# Patient Record
Sex: FEMALE | Race: BLACK OR AFRICAN AMERICAN | NOT HISPANIC OR LATINO | Employment: OTHER | URBAN - METROPOLITAN AREA
[De-identification: names, ages, dates, MRNs, and addresses within clinical notes are randomized per-mention and may not be internally consistent; named-entity substitution may affect disease eponyms.]

---

## 2023-10-02 ENCOUNTER — OFFICE VISIT (OUTPATIENT)
Dept: GASTROENTEROLOGY | Facility: HOSPITAL | Age: 68
End: 2023-10-02
Payer: MEDICAID

## 2023-10-02 VITALS
HEART RATE: 90 BPM | DIASTOLIC BLOOD PRESSURE: 75 MMHG | TEMPERATURE: 96 F | SYSTOLIC BLOOD PRESSURE: 114 MMHG | WEIGHT: 154 LBS

## 2023-10-02 DIAGNOSIS — K86.89 MASS OF HEAD OF PANCREAS (HHS-HCC): Primary | ICD-10-CM

## 2023-10-02 DIAGNOSIS — C80.1 OBSTRUCTIVE JAUNDICE DUE TO MALIGNANT NEOPLASM (MULTI): ICD-10-CM

## 2023-10-02 DIAGNOSIS — K83.1 OBSTRUCTIVE JAUNDICE DUE TO MALIGNANT NEOPLASM (MULTI): ICD-10-CM

## 2023-10-02 PROCEDURE — 1159F MED LIST DOCD IN RCRD: CPT | Performed by: INTERNAL MEDICINE

## 2023-10-02 PROCEDURE — 1036F TOBACCO NON-USER: CPT | Performed by: INTERNAL MEDICINE

## 2023-10-02 PROCEDURE — 99215 OFFICE O/P EST HI 40 MIN: CPT | Performed by: INTERNAL MEDICINE

## 2023-10-02 PROCEDURE — 99205 OFFICE O/P NEW HI 60 MIN: CPT | Performed by: INTERNAL MEDICINE

## 2023-10-02 PROCEDURE — 1126F AMNT PAIN NOTED NONE PRSNT: CPT | Performed by: INTERNAL MEDICINE

## 2023-10-02 RX ORDER — ATORVASTATIN CALCIUM 80 MG/1
80 TABLET, FILM COATED ORAL DAILY
COMMUNITY
Start: 2023-08-21

## 2023-10-02 RX ORDER — FERROUS SULFATE TAB 325 MG (65 MG ELEMENTAL FE) 325 (65 FE) MG
1 TAB ORAL DAILY
COMMUNITY
Start: 2023-06-20

## 2023-10-02 RX ORDER — CYCLOBENZAPRINE HCL 10 MG
10 TABLET ORAL 2 TIMES DAILY PRN
COMMUNITY
Start: 2023-09-06

## 2023-10-02 RX ORDER — LANCETS
EACH MISCELLANEOUS
COMMUNITY
Start: 2023-08-11

## 2023-10-02 RX ORDER — ACETAMINOPHEN 500 MG
5 TABLET ORAL NIGHTLY
COMMUNITY
Start: 2023-09-16

## 2023-10-02 RX ORDER — POLYETHYLENE GLYCOL 3350 17 G/17G
POWDER, FOR SOLUTION ORAL
COMMUNITY
Start: 2023-09-17

## 2023-10-02 RX ORDER — LANCETS 30 GAUGE
EACH MISCELLANEOUS
COMMUNITY
Start: 2023-06-07

## 2023-10-02 RX ORDER — METFORMIN HYDROCHLORIDE 850 MG/1
850 TABLET ORAL 2 TIMES DAILY
COMMUNITY
Start: 2023-09-05 | End: 2023-11-21 | Stop reason: WASHOUT

## 2023-10-02 RX ORDER — DICLOFENAC SODIUM 10 MG/G
GEL TOPICAL
COMMUNITY
Start: 2023-06-08

## 2023-10-02 RX ORDER — OMEPRAZOLE 20 MG/1
20 CAPSULE, DELAYED RELEASE ORAL DAILY
COMMUNITY
Start: 2023-08-21 | End: 2023-11-21 | Stop reason: WASHOUT

## 2023-10-02 RX ORDER — IBUPROFEN 200 MG
TABLET ORAL
COMMUNITY
Start: 2023-09-17

## 2023-10-02 RX ORDER — ACETAMINOPHEN 325 MG/1
TABLET ORAL
COMMUNITY
Start: 2023-09-17

## 2023-10-02 RX ORDER — BLOOD SUGAR DIAGNOSTIC
STRIP MISCELLANEOUS
COMMUNITY
Start: 2023-09-17

## 2023-10-02 RX ORDER — ASCORBIC ACID 1000 MG
TABLET ORAL
COMMUNITY
Start: 2023-09-22 | End: 2023-11-14 | Stop reason: HOSPADM

## 2023-10-02 RX ORDER — INSULIN LISPRO 100 [IU]/ML
INJECTION, SOLUTION INTRAVENOUS; SUBCUTANEOUS
COMMUNITY
Start: 2023-09-17

## 2023-10-02 RX ORDER — LISINOPRIL 20 MG/1
20 TABLET ORAL DAILY
COMMUNITY
Start: 2023-09-05 | End: 2023-11-21 | Stop reason: WASHOUT

## 2023-10-02 RX ORDER — DONEPEZIL HYDROCHLORIDE 10 MG/1
10 TABLET, FILM COATED ORAL
COMMUNITY
Start: 2023-09-05

## 2023-10-02 RX ORDER — LATANOPROST 50 UG/ML
SOLUTION/ DROPS OPHTHALMIC
COMMUNITY
Start: 2023-08-28

## 2023-10-02 RX ORDER — METHOCARBAMOL 500 MG/1
500 TABLET, FILM COATED ORAL EVERY 6 HOURS PRN
COMMUNITY
Start: 2023-02-20 | End: 2023-11-14 | Stop reason: HOSPADM

## 2023-10-02 RX ORDER — AMLODIPINE BESYLATE 10 MG/1
10 TABLET ORAL DAILY
COMMUNITY
Start: 2023-09-06

## 2023-10-02 RX ORDER — FLUTICASONE PROPIONATE 50 MCG
SPRAY, SUSPENSION (ML) NASAL
COMMUNITY
Start: 2023-02-22

## 2023-10-02 RX ORDER — SIMETHICONE 40MG/0.6ML
1 SUSPENSION, DROPS(FINAL DOSAGE FORM)(ML) ORAL DAILY
COMMUNITY
Start: 2023-09-05

## 2023-10-02 RX ORDER — TRAMADOL HYDROCHLORIDE 50 MG/1
50 TABLET ORAL
COMMUNITY
Start: 2023-09-28

## 2023-10-02 RX ORDER — LANCETS 33 GAUGE
EACH MISCELLANEOUS
COMMUNITY
Start: 2023-09-17

## 2023-10-02 RX ORDER — SENNOSIDES 8.6 MG
TABLET ORAL
COMMUNITY
Start: 2023-09-17

## 2023-10-02 RX ORDER — CLONAZEPAM 0.5 MG/1
0.5 TABLET ORAL 2 TIMES DAILY
COMMUNITY
Start: 2023-09-12

## 2023-10-02 RX ORDER — LEVOTHYROXINE SODIUM 75 UG/1
75 TABLET ORAL DAILY
COMMUNITY
Start: 2023-05-24

## 2023-10-02 RX ORDER — PANCRELIPASE 60000; 12000; 38000 [USP'U]/1; [USP'U]/1; [USP'U]/1
CAPSULE, DELAYED RELEASE PELLETS ORAL
COMMUNITY
Start: 2023-09-17

## 2023-10-02 RX ORDER — ONDANSETRON 4 MG/1
TABLET, FILM COATED ORAL
COMMUNITY
Start: 2023-09-17

## 2023-10-02 RX ORDER — PAROXETINE HYDROCHLORIDE 40 MG/1
40 TABLET, FILM COATED ORAL
COMMUNITY
Start: 2023-09-05 | End: 2023-11-21 | Stop reason: WASHOUT

## 2023-10-02 RX ORDER — LIDOCAINE 50 MG/G
PATCH TOPICAL
COMMUNITY
Start: 2023-09-08 | End: 2023-11-14 | Stop reason: HOSPADM

## 2023-10-02 RX ORDER — MEDICAL SUPPLY, MISCELLANEOUS
EACH MISCELLANEOUS
COMMUNITY
Start: 2023-09-17

## 2023-10-02 ASSESSMENT — ENCOUNTER SYMPTOMS
LOSS OF SENSATION IN FEET: 0
OCCASIONAL FEELINGS OF UNSTEADINESS: 1
DEPRESSION: 0

## 2023-10-02 ASSESSMENT — PAIN SCALES - GENERAL: PAINLEVEL: 0-NO PAIN

## 2023-10-02 NOTE — LETTER
October 4, 2023     Siria Medrano MD  [facility]    Patient: Laura No   YOB: 1955   Date of Visit: 10/2/2023       Dear Dr. Siria Medrano MD:    Thank you for referring Laura No to me for evaluation. Below are my notes for this consultation.  If you have questions, please do not hesitate to call me. I look forward to following your patient along with you.       Sincerely,     Demetrio Sarmiento MD      CC: No Recipients  ______________________________________________________________________________________    Laura No is a 68 y.o. female presenting with pancreatic mass.  Ms. No is a 68 year old woman with early Alzheimer's dementia, HTN, poorly-controlled type II diabetes mellitus (last HbA1c = 10.8% 9/22/23; insulin-requiring), hypothyroidism, hyperlipidemia, NCNC anemia, history of COVID 12/2020, depression, and 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 Siria Medrano - [facility] for “mass in pancreas” (per the Central Scheduling comment).     The patient obtains her care from the [facility], and she recently underwent evaluation there for obstructive jaundice secondary to pancreatic mass with elevated  301 (including CT chest/abdomen/pelvis, EUS-FNB, and ERCP with biliary duct stent placement and pancreatic duct stent placement followed by EGD with PD stent removal). She was also recently seen by a pancreatic surgeon (Dr. Mono Rehman) there with recommendations to proceed with Whipple resection, but the patient's daughter was dissatisfied with her mother's care there, and she wanted to obtain another opinion.     The patient is accompanied by her daughter (Maria Luz Wilson [phone]) and her sister who provide additional history. Her daughter provides most of the information regarding her history.     Her daughter reports that her mother went to see Dr. Siria Medrano on 9/11/23 due to nausea, vomiting, and upper  abdominal discomfort for several days. Her physician then told her to go the [facility] ER. She had labs showing elevated LFTs, but her daughter states her mother was not jaundiced. Subsequently, a CT scan revealed a pancreatic mass with double-duct sign, and she was admitted there. She subsequently improved symptom-wise, and she currently is doing well.     Review of 1532 pages of records in [facility] includes the following from the [facility]:  9/22/23 CMP normal except sodium 135 CO2 20 glucose 112 albumin 3.7 calcium 10.5 bilirubin 0.9 alk phos 300 AST 26 ALT 41; TSH 1.690 T4 1.6; HbA1c = 10.8%; Hct 34.4 MCV 91.0  9/19/23 EGD (Dr. Shreyas Baptiste): removal of PD stent  9/17/23 CMP sodium 132 K 3.6 CO2 20 glucose 274 albumin 3.4 calcium 10.3 TB 1.0 alk phos 395 AST 13 ALT 56; Hct 34.8 MCV 88.0  9/15/23 MRI of pancreas/MRCP: area of subtle mildly T2 hyperintense signal adjacent to GB fossa/segment IVB measuring 2.9 cm; mild bile duct dilation post-biliary stent placement; post-cholecystectomy; ill-defined enlargement of pancreatic head with peripancreatic fat stranding and edema with PD dilated to 5 mm with abrupt cut-off in pancreatic neck; 1.2 x 1.1 L adrenal nodule; metallic densities in right ventral abdominal wall  9/14/23 CT chest: no signs of metastatic disease  9/12/23  301; IgG4 5.4; EUS (Dr. Baptiste): 36 mm irregular mass in pancreatic head; EUS-FNB: scant tissue containing duodenal mucosa and smooth muscle with acute and chronic inflammation; no morphologic evidence of malignancy; cell block: limited atypical cells in background of acute and chronic inflammation; ERCP (Dr. Baptiste): 5 Fr x 5 cm “uncovered metal stent” placed into ventral pancreatic duct; biliary pre-cut sphincterotomy done over pancreatic stent; severe stenosis of middle third of CBD with dilation then placement of “7 Fr x 10 cm” plastic biliary stent; CMP normal except sodium 134 K 3.2 TB 4.0 alk phos 831  ; Hct  37.3 MCV 85.0  23 CT abdomen/pelvis: ill-defined 3.4 cm hepatic parenchymal low-attenuation area adjacent to GB fossa; moderate intra- and extra-hepatic biliary dilation to head of pancreas measuring 1.3 cm; dilated PD to 5 cm with abrupt caliber change in pancreatic head associated with 4.4 x 2.7 cm masslike soft-tissue mass; 1.0 cm L adrenal gland nodule unchanged compared to 23; CMP sodium 130 K 3.2 albumin 4.0 calcium 10.7 TB 4.5 alk phos 758   lipase 118  22 CMP normal except glucose 232 calcium 10.9 with albumin 4.6 TB 0.5 alk phos 145 AST 15 ALT 20     PMH:  As noted above     SH:    Lives in [place]  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     Surgical History  She has no past surgical history on file.     Social History  She reports that she has never smoked. She has never used smokeless tobacco. She reports current alcohol use of about 7.0 standard drinks of alcohol per week. She reports that she does not use drugs.     Family History  Family History   No family history on file.        Allergies  Patient has no known allergies.     Review of Systems     Physical Exam  Vitals reviewed.   HENT:      Head: Normocephalic.   Eyes:      Conjunctiva/sclera: Conjunctivae normal.   Cardiovascular:      Rate and Rhythm: Normal rate and regular rhythm.   Pulmonary:      Effort: Pulmonary effort is normal.   Abdominal:      Palpations: Abdomen is soft.    Skin:     General: Skin is warm and dry.   Neurological:      General: No focal deficit present.      Mental Status: She is alert.   Psychiatric:         Mood and Affect: Mood normal.         Last Recorded Vitals  Blood pressure 114/75, pulse 90, temperature 35.6 °C (96 °F), weight 69.9 kg (154 lb).     Relevant Results                 Assessment/Plan         Pancreatic mass with double-duct sign including obstructive jaundice, elevated  (albeit in setting of jaundice), with subsequent endoscopic evaluation and intervention at [facility] (Dr. Baptiste) including EUS-FNB which was non-diagnostic, and ERCP with questionable 5Fr x 5cm “uncovered metal stent” (suspect this was a plastic stent, not a metal stent) placed in the pancreatic duct and subsequently removed on a later EGD, and “7Fr x 10 cm” (suspect 10Fr x 7 cm) plastic biliary stent which remains in place. A subsequent CT of chest revealed no evidence for metastatic disease. A subsequent MRI of the pancreas reportedly revealed no evidence for vessel invasion. The patient's LFTs normalized as of 9/22/23. Plans were made to proceed with Whipple resection, but the patient's daughter requested a second opinion. I had a long discussion with the patient and her family present re: options, risks, benefits, and alternatives. The patient herself would like to proceed with surgery. I discussed that if she were deemed not to be a surgical candidate or if upfront chemotherapy was recommended that another EUS-FNB of the pancreas mass would likely be needed to attempt to acquire tissue diagnosis, although this is most likely to be pancreatic adenocarcinoma. She and her family were also informed of the potential options if repeat EUS-FNB was again non-diagnostic. They were also informed that if malignancy is confirmed and if surgery is not undertaken that a repeat ERCP with biliary stent exchange will also need to be performed before mid-December. After discussion,  the patient still wishes to proceed with surgical evaluation, and her family supports her decision. She wishes to transfer her care to . She was referred to Dr. Alex Dia for his opinion.     Hepatitis C screening.  I discussed CDC guidelines for hepatitis C screening.  At this time, screening has been deferred.     All the patient's, daughter's, and sister's questions were answered to their full satisfaction.      Medical decision-making and time spent in both non-face-to-face time and face-to-face included time spent preparing to see patient, obtaining and/or reviewing separately obtained history, review and/or ordering of labs, radiology studies (including personal review of imaging), medical tests, and/or prior medical records (including summary of records), independently interpreting results and/or communicating results to patient/family, review and/or ordering of endoscopic procedures with risk factors, performing a medically necessary appropriate physical examination, counseling and educating the patient/family, communicating with other providers, care coordination, and documenting clinical information as noted above.        Demetrio Sarmiento MD, RA  Chief Medical   [facility] [place] [facility]  Associate Chief and Director of Clinical Operations  Division of Gastroenterology and Liver Disease  [facility] Master Clinician  Professor of Medicine  [facility]     cc: Siria Medrano MD - [facility]; MD Demetrio Martínez MD    History Of Present Illness  Laura oN is a 68 y.o. female presenting with pancreatic mass.  Ms. No is a 68 year old woman with early Alzheimer's dementia, HTN, poorly-controlled type II diabetes mellitus (last HbA1c = 10.8% 9/22/23; insulin-requiring), hypothyroidism, hyperlipidemia, NCNC anemia, history of COVID 12/2020, depression, and DJD with R  knee replacement 12/2008 who is referred by Dr. Siria Medrano - [facility] for “mass in pancreas” (per the Central Scheduling comment).    The patient obtains her care from the [facility], and she recently underwent evaluation there for obstructive jaundice secondary to pancreatic mass with elevated  301 (including CT chest/abdomen/pelvis, EUS-FNB, and ERCP with biliary duct stent placement and pancreatic duct stent placement followed by EGD with PD stent removal). She was also recently seen by a pancreatic surgeon (Dr. Mono Rehman) there with recommendations to proceed with Whipple resection, but the patient's daughter was dissatisfied with her mother's care there, and she wanted to obtain another opinion.    The patient is accompanied by her daughter (Maria Luz Wilson [phone]) and her sister who provide additional history. Her daughter provides most of the information regarding her history.    Her daughter reports that her mother went to see Dr. Siria Medrano on 9/11/23 due to nausea, vomiting, and upper abdominal discomfort for several days. Her physician then told her to go the [facility] ER. She had labs showing elevated LFTs, but her daughter states her mother was not jaundiced. Subsequently, a CT scan revealed a pancreatic mass with double-duct sign, and she was admitted there. She subsequently improved symptom-wise, and she currently is doing well.    Review of 1532 pages of records in [facility] includes the following from the [facility]:  9/22/23 CMP normal except sodium 135 CO2 20 glucose 112 albumin 3.7 calcium 10.5 bilirubin 0.9 alk phos 300 AST 26 ALT 41; TSH 1.690 T4 1.6; HbA1c = 10.8%; Hct 34.4 MCV 91.0  9/19/23 EGD (Dr. Shreyas Baptiste): removal of PD stent  9/17/23 CMP sodium 132 K 3.6 CO2 20 glucose 274 albumin 3.4 calcium 10.3 TB 1.0 alk phos 395 AST 13 ALT 56; Hct 34.8 MCV 88.0  9/15/23 MRI of pancreas/MRCP: area of subtle mildly T2 hyperintense signal adjacent to  GB fossa/segment IVB measuring 2.9 cm; mild bile duct dilation post-biliary stent placement; post-cholecystectomy; ill-defined enlargement of pancreatic head with peripancreatic fat stranding and edema with PD dilated to 5 mm with abrupt cut-off in pancreatic neck; 1.2 x 1.1 L adrenal nodule; metallic densities in right ventral abdominal wall  23 CT chest: no signs of metastatic disease  23  301; IgG4 5.4; EUS (Dr. Baptiste): 36 mm irregular mass in pancreatic head; EUS-FNB: scant tissue containing duodenal mucosa and smooth muscle with acute and chronic inflammation; no morphologic evidence of malignancy; cell block: limited atypical cells in background of acute and chronic inflammation; ERCP (Dr. Baptiste): 5 Fr x 5 cm “uncovered metal stent” placed into ventral pancreatic duct; biliary pre-cut sphincterotomy done over pancreatic stent; severe stenosis of middle third of CBD with dilation then placement of “7 Fr x 10 cm” plastic biliary stent; CMP normal except sodium 134 K 3.2 TB 4.0 alk phos 831  ; Hct 37.3 MCV 85.0  23 CT abdomen/pelvis: ill-defined 3.4 cm hepatic parenchymal low-attenuation area adjacent to GB fossa; moderate intra- and extra-hepatic biliary dilation to head of pancreas measuring 1.3 cm; dilated PD to 5 cm with abrupt caliber change in pancreatic head associated with 4.4 x 2.7 cm masslike soft-tissue mass; 1.0 cm L adrenal gland nodule unchanged compared to 23; CMP sodium 130 K 3.2 albumin 4.0 calcium 10.7 TB 4.5 alk phos 758   lipase 118  22 CMP normal except glucose 232 calcium 10.9 with albumin 4.6 TB 0.5 alk phos 145 AST 15 ALT 20    PMH:  As noted above    SH:    Lives in [place]  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    Surgical History  She has no past surgical history on file.     Social History  She reports that she has never smoked. She has never used smokeless tobacco. She reports current alcohol use of about 7.0 standard drinks of alcohol per week. She reports that she does not use drugs.    Family History  No family history on file.     Allergies  Patient has no known allergies.    Review of Systems     Physical Exam  Vitals reviewed.   HENT:      Head: Normocephalic.   Eyes:      Conjunctiva/sclera: Conjunctivae normal.   Cardiovascular:      Rate and Rhythm: Normal rate and regular rhythm.   Pulmonary:      Effort: Pulmonary effort is normal.   Abdominal:      Palpations: Abdomen is soft.   Skin:     General: Skin is warm and dry.   Neurological:      General: No focal deficit present.      Mental Status: She is alert.   Psychiatric:         Mood and Affect: Mood normal.        Last Recorded Vitals  Blood pressure 114/75, pulse 90, temperature 35.6 °C (96 °F), weight 69.9 kg (154 lb).    Relevant Results             Assessment/Plan      Pancreatic mass with double-duct sign including obstructive jaundice, elevated  (albeit in setting of jaundice), with subsequent endoscopic evaluation and intervention at [facility] (Dr. Baptiste) including EUS-FNB which was non-diagnostic, and ERCP with questionable 5Fr x 5cm “uncovered metal stent” (suspect this was a plastic stent, not a metal stent) placed in the pancreatic duct and subsequently removed on a later EGD, and “7Fr x 10 cm” (suspect 10Fr x 7 cm) plastic biliary stent which remains in place. A subsequent CT of chest revealed no evidence for  metastatic disease. A subsequent MRI of the pancreas reportedly revealed no evidence for vessel invasion. The patient's LFTs normalized as of 9/22/23. Plans were made to proceed with Whipple resection, but the patient's daughter requested a second opinion. I had a long discussion with the patient and her family present re: options, risks, benefits, and alternatives. The patient herself would like to proceed with surgery. I discussed that if she were deemed not to be a surgical candidate or if upfront chemotherapy was recommended that another EUS-FNB of the pancreas mass would likely be needed to attempt to acquire tissue diagnosis, although this is most likely to be pancreatic adenocarcinoma. She and her family were also informed of the potential options if repeat EUS-FNB was again non-diagnostic. They were also informed that if malignancy is confirmed and if surgery is not undertaken that a repeat ERCP with biliary stent exchange will also need to be performed before mid-December. After discussion, the patient still wishes to proceed with surgical evaluation, and her family supports her decision. She wishes to transfer her care to . She was referred to Dr. Alex Dia for his opinion.    Hepatitis C screening.  I discussed CDC guidelines for hepatitis C screening.  At this time, screening has been deferred.    All the patient's, daughter's, and sister's questions were answered to their full satisfaction.     Medical decision-making and time spent in both non-face-to-face time and face-to-face included time spent preparing to see patient, obtaining and/or reviewing separately obtained history, review and/or ordering of labs, radiology studies (including personal review of imaging), medical tests, and/or prior medical records (including summary of records), independently interpreting results and/or communicating results to patient/family, review and/or ordering of endoscopic procedures with risk factors,  performing a medically necessary appropriate physical examination, counseling and educating the patient/family, communicating with other providers, care coordination, and documenting clinical information as noted above.      Demetrio Sarmiento MD, RA  Chief Medical   [facility]  Associate Chief and Director of Clinical Operations  Division of Gastroenterology and Liver Disease  [facility] Master Clinician  Professor of Medicine  [facility]    cc: Siria Medrano MD - [facility]; MD Demetrio Martínez MD

## 2023-10-03 NOTE — PATIENT INSTRUCTIONS
Thanks for seeing me!  I have given you educational material on pancreas cancer and discussed the various options for treatment. You and your daughter would like to get a second surgical opinion as you are strongly considering to proceed with surgery rather than chemotherapy followed by surgery. I will arrange for you to see Dr. Dia as soon as possible to further discuss.  If you have any other questions or concerns that come up after you see me today, you can call me at [phone].

## 2023-10-03 NOTE — H&P
History Of Present Illness  Laura No is a 68 y.o. female presenting with pancreatic mass.  Ms. No is a 68 year old woman with early Alzheimer's dementia, HTN, poorly-controlled type II diabetes mellitus (last HbA1c = 10.8% 9/22/23; insulin-requiring), hypothyroidism, hyperlipidemia, NCNC anemia, history of COVID 12/2020, depression, and 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 Siria Medrano - [facility] for “mass in pancreas” (per the Central Scheduling comment).    The patient obtains her care from the [facility], and she recently underwent evaluation there for obstructive jaundice secondary to pancreatic mass with elevated  301 (including CT chest/abdomen/pelvis, EUS-FNB, and ERCP with biliary duct stent placement and pancreatic duct stent placement followed by EGD with PD stent removal). She was also recently seen by a pancreatic surgeon (Dr. Mono Rehman) there with recommendations to proceed with Whipple resection, but the patient's daughter was dissatisfied with her mother's care there, and she wanted to obtain another opinion.    The patient is accompanied by her daughter (Maria Luz Wilson [phone]) and her sister who provide additional history. Her daughter provides most of the information regarding her history.    Her daughter reports that her mother went to see Dr. Siria Medrano on 9/11/23 due to nausea, vomiting, and upper abdominal discomfort for several days. Her physician then told her to go the [facility] ER. She had labs showing elevated LFTs, but her daughter states her mother was not jaundiced. Subsequently, a CT scan revealed a pancreatic mass with double-duct sign, and she was admitted there. She subsequently improved symptom-wise, and she currently is doing well.    Review of 1532 pages of records in [facility] includes the following from the [facility]:  9/22/23 CMP normal except sodium 135 CO2 20 glucose 112 albumin 3.7 calcium 10.5  bilirubin 0.9 alk phos 300 AST 26 ALT 41; TSH 1.690 T4 1.6; HbA1c = 10.8%; Hct 34.4 MCV 91.0  9/19/23 EGD (Dr. Shreyas Baptiste): removal of PD stent  9/17/23 CMP sodium 132 K 3.6 CO2 20 glucose 274 albumin 3.4 calcium 10.3 TB 1.0 alk phos 395 AST 13 ALT 56; Hct 34.8 MCV 88.0  9/15/23 MRI of pancreas/MRCP: area of subtle mildly T2 hyperintense signal adjacent to GB fossa/segment IVB measuring 2.9 cm; mild bile duct dilation post-biliary stent placement; post-cholecystectomy; ill-defined enlargement of pancreatic head with peripancreatic fat stranding and edema with PD dilated to 5 mm with abrupt cut-off in pancreatic neck; 1.2 x 1.1 L adrenal nodule; metallic densities in right ventral abdominal wall  9/14/23 CT chest: no signs of metastatic disease  9/12/23  301; IgG4 5.4; EUS (Dr. Baptiste): 36 mm irregular mass in pancreatic head; EUS-FNB: scant tissue containing duodenal mucosa and smooth muscle with acute and chronic inflammation; no morphologic evidence of malignancy; cell block: limited atypical cells in background of acute and chronic inflammation; ERCP (Dr. Baptiste): 5 Fr x 5 cm “uncovered metal stent” placed into ventral pancreatic duct; biliary pre-cut sphincterotomy done over pancreatic stent; severe stenosis of middle third of CBD with dilation then placement of “7 Fr x 10 cm” plastic biliary stent; CMP normal except sodium 134 K 3.2 TB 4.0 alk phos 831  ; Hct 37.3 MCV 85.0  9/11/23 CT abdomen/pelvis: ill-defined 3.4 cm hepatic parenchymal low-attenuation area adjacent to GB fossa; moderate intra- and extra-hepatic biliary dilation to head of pancreas measuring 1.3 cm; dilated PD to 5 cm with abrupt caliber change in pancreatic head associated with 4.4 x 2.7 cm masslike soft-tissue mass; 1.0 cm L adrenal gland nodule unchanged compared to 5/22/23; CMP sodium 130 K 3.2 albumin 4.0 calcium 10.7 TB 4.5 alk phos 758   lipase 118  5/22/22 CMP normal except glucose 232 calcium 10.9 with  albumin 4.6 TB 0.5 alk phos 145 AST 15 ALT 20    PMH:  As noted above    SH:    Lives in [place]  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    Surgical History  She has no past surgical history on file.     Social History  She reports that she has never smoked. She has never used smokeless tobacco. She reports current alcohol use of about 7.0 standard drinks of alcohol per week. She reports that she does not use drugs.    Family History  No family history on file.     Allergies  Patient has no known allergies.    Review of Systems     Physical Exam  Vitals reviewed.   HENT:      Head: Normocephalic.   Eyes:      Conjunctiva/sclera: Conjunctivae normal.   Cardiovascular:      Rate and Rhythm: Normal rate and regular rhythm.   Pulmonary:      Effort: Pulmonary effort is normal.   Abdominal:      Palpations: Abdomen is soft.   Skin:     General: Skin is warm and dry.   Neurological:      General: No focal deficit present.      Mental Status: She is alert.   Psychiatric:         Mood and Affect: Mood normal.        Last Recorded Vitals  Blood pressure 114/75, pulse 90, temperature 35.6 °C (96 °F), weight 69.9 kg (154 lb).    Relevant Results             Assessment/Plan       Pancreatic mass with double-duct sign including obstructive jaundice, elevated  (albeit in setting of jaundice), with subsequent endoscopic evaluation and intervention at [facility] (Dr. Baptiste) including  EUS-FNB which was non-diagnostic, and ERCP with questionable 5Fr x 5cm “uncovered metal stent” (suspect this was a plastic stent, not a metal stent) placed in the pancreatic duct and subsequently removed on a later EGD, and “7Fr x 10 cm” (suspect 10Fr x 7 cm) plastic biliary stent which remains in place. A subsequent CT of chest revealed no evidence for metastatic disease. A subsequent MRI of the pancreas reportedly revealed no evidence for vessel invasion. The patient's LFTs normalized as of 9/22/23. Plans were made to proceed with Whipple resection, but the patient's daughter requested a second opinion. I had a long discussion with the patient and her family present re: options, risks, benefits, and alternatives. The patient herself would like to proceed with surgery. I discussed that if she were deemed not to be a surgical candidate or if upfront chemotherapy was recommended that another EUS-FNB of the pancreas mass would likely be needed to attempt to acquire tissue diagnosis, although this is most likely to be pancreatic adenocarcinoma. She and her family were also informed of the potential options if repeat EUS-FNB was again non-diagnostic. They were also informed that if malignancy is confirmed and if surgery is not undertaken that a repeat ERCP with biliary stent exchange will also need to be performed before mid-December. After discussion, the patient still wishes to proceed with surgical evaluation, and her family supports her decision. She wishes to transfer her care to . She was referred to Dr. Alex Dia for his opinion.    Hepatitis C screening.  I discussed CDC guidelines for hepatitis C screening.  At this time, screening has been deferred.    All the patient's, daughter's, and sister's questions were answered to their full satisfaction.     Medical decision-making and time spent in both non-face-to-face time and face-to-face included time spent preparing to see patient, obtaining and/or  reviewing separately obtained history, review and/or ordering of labs, radiology studies (including personal review of imaging), medical tests, and/or prior medical records (including summary of records), independently interpreting results and/or communicating results to patient/family, review and/or ordering of endoscopic procedures with risk factors, performing a medically necessary appropriate physical examination, counseling and educating the patient/family, communicating with other providers, care coordination, and documenting clinical information as noted above.      Demetrio Sarmiento MD, RA  Chief Medical   [facility]  Associate Chief and Director of Clinical Operations  Division of Gastroenterology and Liver Disease  [facility] Master Clinician  Professor of Medicine  [facility]    cc: Siria Medrano MD - [facility]; MD Demetrio Martínez MD

## 2023-10-04 NOTE — H&P
History Of Present Illness  Laura No is a 68 y.o. female presenting with pancreatic mass.  Ms. No is a 68 year old woman with early Alzheimer's dementia, HTN, poorly-controlled type II diabetes mellitus (last HbA1c = 10.8% 9/22/23; insulin-requiring), hypothyroidism, hyperlipidemia, NCNC anemia, history of COVID 12/2020, depression, and 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 Siria Medrano - [facility] for “mass in pancreas” (per the Central Scheduling comment).    The patient obtains her care from the [facility], and she recently underwent evaluation there for obstructive jaundice secondary to pancreatic mass with elevated  301 (including CT chest/abdomen/pelvis, EUS-FNB, and ERCP with biliary duct stent placement and pancreatic duct stent placement followed by EGD with PD stent removal). She was also recently seen by a pancreatic surgeon (Dr. Mono Rehman) there with recommendations to proceed with Whipple resection, but the patient's daughter was dissatisfied with her mother's care there, and she wanted to obtain another opinion.    The patient is accompanied by her daughter (Maria Luz Wilson [phone]) and her sister who provide additional history. Her daughter provides most of the information regarding her history.    Her daughter reports that her mother went to see Dr. Siria Medrano on 9/11/23 due to nausea, vomiting, and upper abdominal discomfort for several days. Her physician then told her to go the [facility] ER. She had labs showing elevated LFTs, but her daughter states her mother was not jaundiced. Subsequently, a CT scan revealed a pancreatic mass with double-duct sign, and she was admitted there. She subsequently improved symptom-wise, and she currently is doing well.    Review of 1532 pages of records in [facility] includes the following from the [facility]:  9/22/23 CMP normal except sodium 135 CO2 20 glucose 112 albumin 3.7 calcium 10.5  bilirubin 0.9 alk phos 300 AST 26 ALT 41; TSH 1.690 T4 1.6; HbA1c = 10.8%; Hct 34.4 MCV 91.0  9/19/23 EGD (Dr. Shreyas Baptiste): removal of PD stent  9/17/23 CMP sodium 132 K 3.6 CO2 20 glucose 274 albumin 3.4 calcium 10.3 TB 1.0 alk phos 395 AST 13 ALT 56; Hct 34.8 MCV 88.0  9/15/23 MRI of pancreas/MRCP: area of subtle mildly T2 hyperintense signal adjacent to GB fossa/segment IVB measuring 2.9 cm; mild bile duct dilation post-biliary stent placement; post-cholecystectomy; ill-defined enlargement of pancreatic head with peripancreatic fat stranding and edema with PD dilated to 5 mm with abrupt cut-off in pancreatic neck; 1.2 x 1.1 L adrenal nodule; metallic densities in right ventral abdominal wall  9/14/23 CT chest: no signs of metastatic disease  9/12/23  301; IgG4 5.4; EUS (Dr. Baptiste): 36 mm irregular mass in pancreatic head; EUS-FNB: scant tissue containing duodenal mucosa and smooth muscle with acute and chronic inflammation; no morphologic evidence of malignancy; cell block: limited atypical cells in background of acute and chronic inflammation; ERCP (Dr. Baptiste): 5 Fr x 5 cm “uncovered metal stent” placed into ventral pancreatic duct; biliary pre-cut sphincterotomy done over pancreatic stent; severe stenosis of middle third of CBD with dilation then placement of “7 Fr x 10 cm” plastic biliary stent; CMP normal except sodium 134 K 3.2 TB 4.0 alk phos 831  ; Hct 37.3 MCV 85.0  9/11/23 CT abdomen/pelvis: ill-defined 3.4 cm hepatic parenchymal low-attenuation area adjacent to GB fossa; moderate intra- and extra-hepatic biliary dilation to head of pancreas measuring 1.3 cm; dilated PD to 5 cm with abrupt caliber change in pancreatic head associated with 4.4 x 2.7 cm masslike soft-tissue mass; 1.0 cm L adrenal gland nodule unchanged compared to 5/22/23; CMP sodium 130 K 3.2 albumin 4.0 calcium 10.7 TB 4.5 alk phos 758   lipase 118  5/22/22 CMP normal except glucose 232 calcium 10.9 with  albumin 4.6 TB 0.5 alk phos 145 AST 15 ALT 20    PMH:  As noted above    SH:    Lives in [place]  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    Surgical History  She has no past surgical history on file.     Social History  She reports that she has never smoked. She has never used smokeless tobacco. She reports current alcohol use of about 7.0 standard drinks of alcohol per week. She reports that she does not use drugs.    Family History  No family history on file.     Allergies  Patient has no known allergies.    Review of Systems     Physical Exam  Vitals reviewed.   HENT:      Head: Normocephalic.   Eyes:      Conjunctiva/sclera: Conjunctivae normal.   Cardiovascular:      Rate and Rhythm: Normal rate and regular rhythm.   Pulmonary:      Effort: Pulmonary effort is normal.   Abdominal:      Palpations: Abdomen is soft.   Skin:     General: Skin is warm and dry.   Neurological:      General: No focal deficit present.      Mental Status: She is alert.   Psychiatric:         Mood and Affect: Mood normal.        Last Recorded Vitals  Blood pressure 114/75, pulse 90, temperature 35.6 °C (96 °F), weight 69.9 kg (154 lb).    Relevant Results             Assessment/Plan       Pancreatic mass with double-duct sign including obstructive jaundice, elevated  (albeit in setting of jaundice), with subsequent endoscopic evaluation and intervention at [facility] (Dr. Baptiste) including  EUS-FNB which was non-diagnostic, and ERCP with questionable 5Fr x 5cm “uncovered metal stent” (suspect this was a plastic stent, not a metal stent) placed in the pancreatic duct and subsequently removed on a later EGD, and “7Fr x 10 cm” (suspect 10Fr x 7 cm) plastic biliary stent which remains in place. A subsequent CT of chest revealed no evidence for metastatic disease. A subsequent MRI of the pancreas reportedly revealed no evidence for vessel invasion. The patient's LFTs normalized as of 9/22/23. Plans were made to proceed with Whipple resection, but the patient's daughter requested a second opinion. I had a long discussion with the patient and her family present re: options, risks, benefits, and alternatives. The patient herself would like to proceed with surgery. I discussed that if she were deemed not to be a surgical candidate or if upfront chemotherapy was recommended that another EUS-FNB of the pancreas mass would likely be needed to attempt to acquire tissue diagnosis, although this is most likely to be pancreatic adenocarcinoma. She and her family were also informed of the potential options if repeat EUS-FNB was again non-diagnostic. They were also informed that if malignancy is confirmed and if surgery is not undertaken that a repeat ERCP with biliary stent exchange will also need to be performed before mid-December. After discussion, the patient still wishes to proceed with surgical evaluation, and her family supports her decision. She wishes to transfer her care to . She was referred to Dr. Alex Dia for his opinion.    Hepatitis C screening.  I discussed CDC guidelines for hepatitis C screening.  At this time, screening has been deferred.    All the patient's, daughter's, and sister's questions were answered to their full satisfaction.     Medical decision-making and time spent in both non-face-to-face time and face-to-face included time spent preparing to see patient, obtaining and/or  reviewing separately obtained history, review and/or ordering of labs, radiology studies (including personal review of imaging), medical tests, and/or prior medical records (including summary of records), independently interpreting results and/or communicating results to patient/family, review and/or ordering of endoscopic procedures with risk factors, performing a medically necessary appropriate physical examination, counseling and educating the patient/family, communicating with other providers, care coordination, and documenting clinical information as noted above.      Demetrio Sarmiento MD, RA  Chief Medical   [facility] [place]  Associate Chief and Director of Clinical Operations  Division of Gastroenterology and Liver Disease  [facility] Master Clinician  Professor of Medicine  [facility]    cc: Siria Medrano MD - [facility]; MD Demetrio Martínez MD

## 2023-10-04 NOTE — H&P (VIEW-ONLY)
Laura No is a 68 y.o. female presenting with pancreatic mass.  Ms. No is a 68 year old woman with early Alzheimer's dementia, HTN, poorly-controlled type II diabetes mellitus (last HbA1c = 10.8% 9/22/23; insulin-requiring), hypothyroidism, hyperlipidemia, NCNC anemia, history of COVID 12/2020, depression, and 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 Siria Medrano - [facility] for “mass in pancreas” (per the Central Scheduling comment).     The patient obtains her care from the [facility], and she recently underwent evaluation there for obstructive jaundice secondary to pancreatic mass with elevated  301 (including CT chest/abdomen/pelvis, EUS-FNB, and ERCP with biliary duct stent placement and pancreatic duct stent placement followed by EGD with PD stent removal). She was also recently seen by a pancreatic surgeon (Dr. Mono Rehman) there with recommendations to proceed with Whipple resection, but the patient's daughter was dissatisfied with her mother's care there, and she wanted to obtain another opinion.     The patient is accompanied by her daughter (Maria Luz Wilson [phone]) and her sister who provide additional history. Her daughter provides most of the information regarding her history.     Her daughter reports that her mother went to see Dr. Siria Medrano on 9/11/23 due to nausea, vomiting, and upper abdominal discomfort for several days. Her physician then told her to go the [facility] ER. She had labs showing elevated LFTs, but her daughter states her mother was not jaundiced. Subsequently, a CT scan revealed a pancreatic mass with double-duct sign, and she was admitted there. She subsequently improved symptom-wise, and she currently is doing well.     Review of 1532 pages of records in [facility] includes the following from the [facility]:  9/22/23 CMP normal except sodium 135 CO2 20 glucose 112 albumin 3.7 calcium 10.5 bilirubin 0.9 alk phos  300 AST 26 ALT 41; TSH 1.690 T4 1.6; HbA1c = 10.8%; Hct 34.4 MCV 91.0  9/19/23 EGD (Dr. Shreyas Baptiste): removal of PD stent  9/17/23 CMP sodium 132 K 3.6 CO2 20 glucose 274 albumin 3.4 calcium 10.3 TB 1.0 alk phos 395 AST 13 ALT 56; Hct 34.8 MCV 88.0  9/15/23 MRI of pancreas/MRCP: area of subtle mildly T2 hyperintense signal adjacent to GB fossa/segment IVB measuring 2.9 cm; mild bile duct dilation post-biliary stent placement; post-cholecystectomy; ill-defined enlargement of pancreatic head with peripancreatic fat stranding and edema with PD dilated to 5 mm with abrupt cut-off in pancreatic neck; 1.2 x 1.1 L adrenal nodule; metallic densities in right ventral abdominal wall  9/14/23 CT chest: no signs of metastatic disease  9/12/23  301; IgG4 5.4; EUS (Dr. Baptiste): 36 mm irregular mass in pancreatic head; EUS-FNB: scant tissue containing duodenal mucosa and smooth muscle with acute and chronic inflammation; no morphologic evidence of malignancy; cell block: limited atypical cells in background of acute and chronic inflammation; ERCP (Dr. Baptiste): 5 Fr x 5 cm “uncovered metal stent” placed into ventral pancreatic duct; biliary pre-cut sphincterotomy done over pancreatic stent; severe stenosis of middle third of CBD with dilation then placement of “7 Fr x 10 cm” plastic biliary stent; CMP normal except sodium 134 K 3.2 TB 4.0 alk phos 831  ; Hct 37.3 MCV 85.0  9/11/23 CT abdomen/pelvis: ill-defined 3.4 cm hepatic parenchymal low-attenuation area adjacent to GB fossa; moderate intra- and extra-hepatic biliary dilation to head of pancreas measuring 1.3 cm; dilated PD to 5 cm with abrupt caliber change in pancreatic head associated with 4.4 x 2.7 cm masslike soft-tissue mass; 1.0 cm L adrenal gland nodule unchanged compared to 5/22/23; CMP sodium 130 K 3.2 albumin 4.0 calcium 10.7 TB 4.5 alk phos 758   lipase 118  5/22/22 CMP normal except glucose 232 calcium 10.9 with albumin 4.6 TB 0.5 alk  phos 145 AST 15 ALT 20     PMH:  As noted above     SH:    Lives in [place]  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     Surgical History  She has no past surgical history on file.     Social History  She reports that she has never smoked. She has never used smokeless tobacco. She reports current alcohol use of about 7.0 standard drinks of alcohol per week. She reports that she does not use drugs.     Family History  Family History   No family history on file.        Allergies  Patient has no known allergies.     Review of Systems     Physical Exam  Vitals reviewed.   HENT:      Head: Normocephalic.   Eyes:      Conjunctiva/sclera: Conjunctivae normal.   Cardiovascular:      Rate and Rhythm: Normal rate and regular rhythm.   Pulmonary:      Effort: Pulmonary effort is normal.   Abdominal:      Palpations: Abdomen is soft.   Skin:     General: Skin is warm and dry.   Neurological:      General: No focal deficit present.      Mental Status: She is alert.   Psychiatric:         Mood and Affect: Mood normal.         Last Recorded Vitals  Blood pressure 114/75, pulse 90, temperature 35.6 °C (96 °F), weight 69.9 kg (154 lb).     Relevant Results                 Assessment/Plan         Pancreatic mass with double-duct sign including obstructive jaundice, elevated  (albeit in setting of jaundice), with subsequent endoscopic evaluation and intervention at [facility] (Dr. Baptiste)  including EUS-FNB which was non-diagnostic, and ERCP with questionable 5Fr x 5cm “uncovered metal stent” (suspect this was a plastic stent, not a metal stent) placed in the pancreatic duct and subsequently removed on a later EGD, and “7Fr x 10 cm” (suspect 10Fr x 7 cm) plastic biliary stent which remains in place. A subsequent CT of chest revealed no evidence for metastatic disease. A subsequent MRI of the pancreas reportedly revealed no evidence for vessel invasion. The patient's LFTs normalized as of 9/22/23. Plans were made to proceed with Whipple resection, but the patient's daughter requested a second opinion. I had a long discussion with the patient and her family present re: options, risks, benefits, and alternatives. The patient herself would like to proceed with surgery. I discussed that if she were deemed not to be a surgical candidate or if upfront chemotherapy was recommended that another EUS-FNB of the pancreas mass would likely be needed to attempt to acquire tissue diagnosis, although this is most likely to be pancreatic adenocarcinoma. She and her family were also informed of the potential options if repeat EUS-FNB was again non-diagnostic. They were also informed that if malignancy is confirmed and if surgery is not undertaken that a repeat ERCP with biliary stent exchange will also need to be performed before mid-December. After discussion, the patient still wishes to proceed with surgical evaluation, and her family supports her decision. She wishes to transfer her care to . She was referred to Dr. Alex Dia for his opinion.     Hepatitis C screening.  I discussed CDC guidelines for hepatitis C screening.  At this time, screening has been deferred.     All the patient's, daughter's, and sister's questions were answered to their full satisfaction.      Medical decision-making and time spent in both non-face-to-face time and face-to-face included time spent preparing to see patient,  obtaining and/or reviewing separately obtained history, review and/or ordering of labs, radiology studies (including personal review of imaging), medical tests, and/or prior medical records (including summary of records), independently interpreting results and/or communicating results to patient/family, review and/or ordering of endoscopic procedures with risk factors, performing a medically necessary appropriate physical examination, counseling and educating the patient/family, communicating with other providers, care coordination, and documenting clinical information as noted above.        Demetrio Sarmiento MD, RA  Chief Medical   [facility] [place]  Associate Chief and Director of Clinical Operations  Division of Gastroenterology and Liver Disease  [facility] Master Clinician  Professor of Medicine  [facility]     cc: Siria Medrano MD - [facility]; MD Demetrio Martínez MD

## 2023-10-04 NOTE — PROGRESS NOTES
Laura No is a 68 y.o. female presenting with pancreatic mass.  Ms. No is a 68 year old woman with early Alzheimer's dementia, HTN, poorly-controlled type II diabetes mellitus (last HbA1c = 10.8% 9/22/23; insulin-requiring), hypothyroidism, hyperlipidemia, NCNC anemia, history of COVID 12/2020, depression, and 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 Siria Medrano - [facility] for “mass in pancreas” (per the Central Scheduling comment).     The patient obtains her care from the [facility], and she recently underwent evaluation there for obstructive jaundice secondary to pancreatic mass with elevated  301 (including CT chest/abdomen/pelvis, EUS-FNB, and ERCP with biliary duct stent placement and pancreatic duct stent placement followed by EGD with PD stent removal). She was also recently seen by a pancreatic surgeon (Dr. Mono Rehman) there with recommendations to proceed with Whipple resection, but the patient's daughter was dissatisfied with her mother's care there, and she wanted to obtain another opinion.     The patient is accompanied by her daughter (Maria Luz Wilson [phone]) and her sister who provide additional history. Her daughter provides most of the information regarding her history.     Her daughter reports that her mother went to see Dr. Siria Medrano on 9/11/23 due to nausea, vomiting, and upper abdominal discomfort for several days. Her physician then told her to go the [facility] ER. She had labs showing elevated LFTs, but her daughter states her mother was not jaundiced. Subsequently, a CT scan revealed a pancreatic mass with double-duct sign, and she was admitted there. She subsequently improved symptom-wise, and she currently is doing well.     Review of 1532 pages of records in [facility] includes the following from the [facility]:  9/22/23 CMP normal except sodium 135 CO2 20 glucose 112 albumin 3.7 calcium 10.5 bilirubin 0.9 alk phos  300 AST 26 ALT 41; TSH 1.690 T4 1.6; HbA1c = 10.8%; Hct 34.4 MCV 91.0  9/19/23 EGD (Dr. Shreays Baptiste): removal of PD stent  9/17/23 CMP sodium 132 K 3.6 CO2 20 glucose 274 albumin 3.4 calcium 10.3 TB 1.0 alk phos 395 AST 13 ALT 56; Hct 34.8 MCV 88.0  9/15/23 MRI of pancreas/MRCP: area of subtle mildly T2 hyperintense signal adjacent to GB fossa/segment IVB measuring 2.9 cm; mild bile duct dilation post-biliary stent placement; post-cholecystectomy; ill-defined enlargement of pancreatic head with peripancreatic fat stranding and edema with PD dilated to 5 mm with abrupt cut-off in pancreatic neck; 1.2 x 1.1 L adrenal nodule; metallic densities in right ventral abdominal wall  9/14/23 CT chest: no signs of metastatic disease  9/12/23  301; IgG4 5.4; EUS (Dr. Baptiste): 36 mm irregular mass in pancreatic head; EUS-FNB: scant tissue containing duodenal mucosa and smooth muscle with acute and chronic inflammation; no morphologic evidence of malignancy; cell block: limited atypical cells in background of acute and chronic inflammation; ERCP (Dr. Baptiste): 5 Fr x 5 cm “uncovered metal stent” placed into ventral pancreatic duct; biliary pre-cut sphincterotomy done over pancreatic stent; severe stenosis of middle third of CBD with dilation then placement of “7 Fr x 10 cm” plastic biliary stent; CMP normal except sodium 134 K 3.2 TB 4.0 alk phos 831  ; Hct 37.3 MCV 85.0  9/11/23 CT abdomen/pelvis: ill-defined 3.4 cm hepatic parenchymal low-attenuation area adjacent to GB fossa; moderate intra- and extra-hepatic biliary dilation to head of pancreas measuring 1.3 cm; dilated PD to 5 cm with abrupt caliber change in pancreatic head associated with 4.4 x 2.7 cm masslike soft-tissue mass; 1.0 cm L adrenal gland nodule unchanged compared to 5/22/23; CMP sodium 130 K 3.2 albumin 4.0 calcium 10.7 TB 4.5 alk phos 758   lipase 118  5/22/22 CMP normal except glucose 232 calcium 10.9 with albumin 4.6 TB 0.5 alk  phos 145 AST 15 ALT 20     PMH:  As noted above     SH:    Lives in [place]  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     Surgical History  She has no past surgical history on file.     Social History  She reports that she has never smoked. She has never used smokeless tobacco. She reports current alcohol use of about 7.0 standard drinks of alcohol per week. She reports that she does not use drugs.     Family History  Family History   No family history on file.        Allergies  Patient has no known allergies.     Review of Systems     Physical Exam  Vitals reviewed.   HENT:      Head: Normocephalic.   Eyes:      Conjunctiva/sclera: Conjunctivae normal.   Cardiovascular:      Rate and Rhythm: Normal rate and regular rhythm.   Pulmonary:      Effort: Pulmonary effort is normal.   Abdominal:      Palpations: Abdomen is soft.   Skin:     General: Skin is warm and dry.   Neurological:      General: No focal deficit present.      Mental Status: She is alert.   Psychiatric:         Mood and Affect: Mood normal.         Last Recorded Vitals  Blood pressure 114/75, pulse 90, temperature 35.6 °C (96 °F), weight 69.9 kg (154 lb).     Relevant Results                 Assessment/Plan         Pancreatic mass with double-duct sign including obstructive jaundice, elevated  (albeit in setting of jaundice), with subsequent endoscopic evaluation and intervention at [facility] (Dr. Baptiste)  including EUS-FNB which was non-diagnostic, and ERCP with questionable 5Fr x 5cm “uncovered metal stent” (suspect this was a plastic stent, not a metal stent) placed in the pancreatic duct and subsequently removed on a later EGD, and “7Fr x 10 cm” (suspect 10Fr x 7 cm) plastic biliary stent which remains in place. A subsequent CT of chest revealed no evidence for metastatic disease. A subsequent MRI of the pancreas reportedly revealed no evidence for vessel invasion. The patient's LFTs normalized as of 9/22/23. Plans were made to proceed with Whipple resection, but the patient's daughter requested a second opinion. I had a long discussion with the patient and her family present re: options, risks, benefits, and alternatives. The patient herself would like to proceed with surgery. I discussed that if she were deemed not to be a surgical candidate or if upfront chemotherapy was recommended that another EUS-FNB of the pancreas mass would likely be needed to attempt to acquire tissue diagnosis, although this is most likely to be pancreatic adenocarcinoma. She and her family were also informed of the potential options if repeat EUS-FNB was again non-diagnostic. They were also informed that if malignancy is confirmed and if surgery is not undertaken that a repeat ERCP with biliary stent exchange will also need to be performed before mid-December. After discussion, the patient still wishes to proceed with surgical evaluation, and her family supports her decision. She wishes to transfer her care to . She was referred to Dr. Alex Dia for his opinion.     Hepatitis C screening.  I discussed CDC guidelines for hepatitis C screening.  At this time, screening has been deferred.     All the patient's, daughter's, and sister's questions were answered to their full satisfaction.      Medical decision-making and time spent in both non-face-to-face time and face-to-face included time spent preparing to see patient,  obtaining and/or reviewing separately obtained history, review and/or ordering of labs, radiology studies (including personal review of imaging), medical tests, and/or prior medical records (including summary of records), independently interpreting results and/or communicating results to patient/family, review and/or ordering of endoscopic procedures with risk factors, performing a medically necessary appropriate physical examination, counseling and educating the patient/family, communicating with other providers, care coordination, and documenting clinical information as noted above.        Demetrio Sarmiento MD, RA  Chief Medical   [facility] [place]  Associate Chief and Director of Clinical Operations  Division of Gastroenterology and Liver Disease  [facility] Master Clinician  Professor of Medicine  [facility]     cc: Siria Medrano MD - [facility]; MD Demetrio Martínez MD

## 2023-10-04 NOTE — PROGRESS NOTES
Chief Complaint:  Concern for PDAC    HPI:  67 yo woman from [facility] who underwent an evaluation for obstructive jaundice and a pancreatic head abnormality at the [facility].  She was worked up there and recently seen by my colleague Dr. Sarmiento in GI and referred to me.  I have reviewed his note as well as the notes available to me within epic.  She initially presented with wt loss and pain.      On September 11 this patient had a CAT scan that showed an ill-defined unchanged low-attenuation in the liver stable to May 2023.  There was biliary dilatation as well as pancreatic ductal dilatation.  There was a 4.4 cm masslike soft tissue fullness in the pancreatic head and an unchanged left adrenal nodule.  Subsequent to that a chest CT showed no evidence of disease in the mediastinum or chest.      Endoscopic evaluation in the form of endoscopic ultrasound and ERCP showed a bulging papilla and a severe biliary stricture which was stented.  There was a 3.69 m pancreatic head mass.  Both fine-needle aspiration biopsy and cytology have failed to show cancer.  1 specimen had scant tissue with duodenal mucosa, muscle, and inflammation.  The other it was limited atypical cells and acute and chronic inflammation.      Subsequent to that an MRI was done that showed no evidence of any worrisome liver lesions.  There was mild acute pancreatitis.  There was improved biliary dilatation as this was done after the endoscopic intervention.  There was enlarged pancreatic head with pancreatic duct dilatation going to the level of the neck of the gland.  There is indeterminate lymphadenopathy.      This patient's 19-9 was 301 when her bilirubin was 1.8.  Her IgG4 is normal.  Her albumin was 3.5 in September.    Pain is better.  Has lost about 40 pounds.     She does not smoke.  She thinks her brother had some form of cancer.      PMH: Early Alzheimer's disease, hypertension, diabetes, hypothyroidism, hyperlipidemia,  anemia    PSH: Right total knee replacement and cholecystectomy    Soc:  3 kids, 8 grandkids, 3 great grandkids    PE: This patient appears her stated age.  She is not jaundiced.  There is no palpable lymphadenopathy.  Her breath sounds are clear.  Her heart is regular without obvious murmur.  Her abdomen is soft and nontender with a right upper quadrant scar.  She has 2+ pedal pulses.    Data:  Able to review CT before clinic.  No obvious mets.  HOP mass like.  PD cut-off there.  Classic arterial anatomy.  Veins fine x one area of possible contour change.    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  I base that on her presentation, imaging, endoscopic evaluation, and laboratory work.  As noted above the endoscopic evaluation did not give us a diagnosis.  In fact it did not even give us the suggestion of atypical cells suspicious for cancer.    I have meant a long time today with this patient and 2 of her daughters.  I told them that I think the most likely diagnosis is pancreatic cancer.  Again based on the currently available data she would be described as localized and radiographically resectable.  Our center approach is the vast majority of patients with localized pancreatic cancer utilizing neoadjuvant chemotherapy.  I took the time and explained to this patient and her family why that is.  We also talked about a surgery first approach for patients with diagnosed pancreatic cancer.  Again this patient does not have a diagnosis right now but we prefer to give 2 shots at diagnosis before proceeding to a surgery first approach.    We also talked about the ideal treatment for localized pancreatic cancer being multimodal.  That is a state includes surgery and chemotherapy, occasionally radiation therapy.  We furthermore talked about noncurative intent approaches such as chemo alone or chemotherapy with chemoradiation  therapy.    I think this patient would be able to tolerate curative intent therapy and that is what she is interested in.  Unequivocally she is interested in trying to get preoperative chemotherapy and that will necessitate a repeat endoscopic evaluation.  For that I will be reaching out to Dr. Sarmiento who saw this patient earlier this week.  I will ask him to get this patient in with either himself or one of his colleagues next week at either the [facility] or [facility] to expedite this patient's care.    If this patient's repeat endoscopic evaluation does not give us a definitive diagnosis of pancreatic cancer then we will have a further discussion about a Whipple procedure.    I also hope to present this patient at our weekly pancreas imaging review conference next week    This note has been dictated with voice recognition software and has not been reviewed for grammar or content errors.

## 2023-10-05 PROBLEM — K86.89 PANCREATIC MASS (HHS-HCC): Status: ACTIVE | Noted: 2023-10-05

## 2023-10-06 ENCOUNTER — LAB (OUTPATIENT)
Dept: LAB | Facility: LAB | Age: 68
End: 2023-10-06
Payer: MEDICAID

## 2023-10-06 ENCOUNTER — OFFICE VISIT (OUTPATIENT)
Dept: SURGERY | Facility: CLINIC | Age: 68
End: 2023-10-06
Payer: MEDICAID

## 2023-10-06 VITALS
BODY MASS INDEX: 28.51 KG/M2 | SYSTOLIC BLOOD PRESSURE: 132 MMHG | DIASTOLIC BLOOD PRESSURE: 87 MMHG | HEART RATE: 101 BPM | HEIGHT: 61 IN | WEIGHT: 151 LBS

## 2023-10-06 DIAGNOSIS — K86.89 PANCREATIC MASS (HHS-HCC): Primary | ICD-10-CM

## 2023-10-06 DIAGNOSIS — I10 HYPERTENSION, UNSPECIFIED TYPE: ICD-10-CM

## 2023-10-06 DIAGNOSIS — K86.89 PANCREATIC MASS (HHS-HCC): ICD-10-CM

## 2023-10-06 LAB
ALBUMIN SERPL BCP-MCNC: 4.2 G/DL (ref 3.4–5)
ALP SERPL-CCNC: 147 U/L (ref 33–136)
ALT SERPL W P-5'-P-CCNC: 26 U/L (ref 7–45)
ANION GAP SERPL CALC-SCNC: 14 MMOL/L (ref 10–20)
AST SERPL W P-5'-P-CCNC: 20 U/L (ref 9–39)
BILIRUB SERPL-MCNC: 0.8 MG/DL (ref 0–1.2)
BUN SERPL-MCNC: 11 MG/DL (ref 6–23)
CALCIUM SERPL-MCNC: 10.5 MG/DL (ref 8.6–10.6)
CANCER AG19-9 SERPL-ACNC: 22.24 U/ML
CHLORIDE SERPL-SCNC: 108 MMOL/L (ref 98–107)
CO2 SERPL-SCNC: 21 MMOL/L (ref 21–32)
CREAT SERPL-MCNC: 0.54 MG/DL (ref 0.5–1.05)
GFR SERPL CREATININE-BSD FRML MDRD: >90 ML/MIN/1.73M*2
GLUCOSE SERPL-MCNC: 134 MG/DL (ref 74–99)
POTASSIUM SERPL-SCNC: 4.2 MMOL/L (ref 3.5–5.3)
PREALB SERPL-MCNC: 31 MG/DL (ref 18–40)
PROT SERPL-MCNC: 6.6 G/DL (ref 6.4–8.2)
SODIUM SERPL-SCNC: 139 MMOL/L (ref 136–145)

## 2023-10-06 PROCEDURE — 1036F TOBACCO NON-USER: CPT | Performed by: SURGERY

## 2023-10-06 PROCEDURE — 99205 OFFICE O/P NEW HI 60 MIN: CPT | Performed by: SURGERY

## 2023-10-06 PROCEDURE — 3079F DIAST BP 80-89 MM HG: CPT | Performed by: SURGERY

## 2023-10-06 PROCEDURE — 1125F AMNT PAIN NOTED PAIN PRSNT: CPT | Performed by: SURGERY

## 2023-10-06 PROCEDURE — 84134 ASSAY OF PREALBUMIN: CPT

## 2023-10-06 PROCEDURE — 3075F SYST BP GE 130 - 139MM HG: CPT | Performed by: SURGERY

## 2023-10-06 PROCEDURE — 86301 IMMUNOASSAY TUMOR CA 19-9: CPT

## 2023-10-06 PROCEDURE — 36415 COLL VENOUS BLD VENIPUNCTURE: CPT

## 2023-10-06 PROCEDURE — 1159F MED LIST DOCD IN RCRD: CPT | Performed by: SURGERY

## 2023-10-06 PROCEDURE — 80053 COMPREHEN METABOLIC PANEL: CPT

## 2023-10-06 ASSESSMENT — PAIN SCALES - GENERAL: PAINLEVEL: 7

## 2023-10-06 NOTE — LETTER
October 6, 2023       No Recipients    Patient: Laura No   YOB: 1955   Date of Visit: 10/6/2023       Dear Dr. Piper Recipients:    Thank you for referring Laura No to me for evaluation. Below are my notes for this consultation.  If you have questions, please do not hesitate to call me. I look forward to following your patient along with you.       Sincerely,     Alex Dia MD      CC:   No Recipients  ______________________________________________________________________________________    Chief Complaint:  Concern for PDAC    HPI:  67 yo woman from [facility] who underwent an evaluation for obstructive jaundice and a pancreatic head abnormality at the [facility].  She was worked up there and recently seen by my colleague Dr. Sarmiento in GI and referred to me.  I have reviewed his note as well as the notes available to me within epic.  She initially presented with wt loss and pain.      On September 11 this patient had a CAT scan that showed an ill-defined unchanged low-attenuation in the liver stable to May 2023.  There was biliary dilatation as well as pancreatic ductal dilatation.  There was a 4.4 cm masslike soft tissue fullness in the pancreatic head and an unchanged left adrenal nodule.  Subsequent to that a chest CT showed no evidence of disease in the mediastinum or chest.      Endoscopic evaluation in the form of endoscopic ultrasound and ERCP showed a bulging papilla and a severe biliary stricture which was stented.  There was a 3.69 m pancreatic head mass.  Both fine-needle aspiration biopsy and cytology have failed to show cancer.  1 specimen had scant tissue with duodenal mucosa, muscle, and inflammation.  The other it was limited atypical cells and acute and chronic inflammation.      Subsequent to that an MRI was done that showed no evidence of any worrisome liver lesions.  There was mild acute pancreatitis.  There was improved biliary dilatation as this was done  after the endoscopic intervention.  There was enlarged pancreatic head with pancreatic duct dilatation going to the level of the neck of the gland.  There is indeterminate lymphadenopathy.      This patient's 19-9 was 301 when her bilirubin was 1.8.  Her IgG4 is normal.  Her albumin was 3.5 in September.    Pain is better.  Has lost about 40 pounds.     She does not smoke.  She thinks her brother had some form of cancer.      PMH: Early Alzheimer's disease, hypertension, diabetes, hypothyroidism, hyperlipidemia, anemia    PSH: Right total knee replacement and cholecystectomy    Soc:  3 kids, 8 grandkids, 3 great grandkids    PE: This patient appears her stated age.  She is not jaundiced.  There is no palpable lymphadenopathy.  Her breath sounds are clear.  Her heart is regular without obvious murmur.  Her abdomen is soft and nontender with a right upper quadrant scar.  She has 2+ pedal pulses.    Data:  Able to review CT before clinic.  No obvious mets.  HOP mass like.  PD cut-off there.  Classic arterial anatomy.  Veins fine x one area of possible contour change.    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  I base that on her presentation, imaging, endoscopic evaluation, and laboratory work.  As noted above the endoscopic evaluation did not give us a diagnosis.  In fact it did not even give us the suggestion of atypical cells suspicious for cancer.    I have meant a long time today with this patient and 2 of her daughters.  I told them that I think the most likely diagnosis is pancreatic cancer.  Again based on the currently available data she would be described as localized and radiographically resectable.  Our center approach is the vast majority of patients with localized pancreatic cancer utilizing neoadjuvant chemotherapy.  I took the time and explained to this patient and her family why that is.  We also talked about a  surgery first approach for patients with diagnosed pancreatic cancer.  Again this patient does not have a diagnosis right now but we prefer to give 2 shots at diagnosis before proceeding to a surgery first approach.    We also talked about the ideal treatment for localized pancreatic cancer being multimodal.  That is a state includes surgery and chemotherapy, occasionally radiation therapy.  We furthermore talked about noncurative intent approaches such as chemo alone or chemotherapy with chemoradiation therapy.    I think this patient would be able to tolerate curative intent therapy and that is what she is interested in.  Unequivocally she is interested in trying to get preoperative chemotherapy and that will necessitate a repeat endoscopic evaluation.  For that I will be reaching out to Dr. Sarmiento who saw this patient earlier this week.  I will ask him to get this patient in with either himself or one of his colleagues next week at either the [facility] or [facility] to expedite this patient's care.    If this patient's repeat endoscopic evaluation does not give us a definitive diagnosis of pancreatic cancer then we will have a further discussion about a Whipple procedure.    I also hope to present this patient at our weekly pancreas imaging review conference next week    This note has been dictated with voice recognition software and has not been reviewed for grammar or content errors.

## 2023-10-06 NOTE — LETTER
October 6, 2023       No Recipients    Patient: Laura No   YOB: 1955   Date of Visit: 10/6/2023       Dear Dr. Piper Recipients:    Thank you for referring Laura No to me for evaluation. Below are my notes for this consultation.  If you have questions, please do not hesitate to call me. I look forward to following your patient along with you.       Sincerely,     Alex Dia MD      CC:   No Recipients  ______________________________________________________________________________________    Chief Complaint:  Concern for PDAC    HPI:  67 yo woman from [facility] who underwent an evaluation for obstructive jaundice and a pancreatic head abnormality at the [facility].  She was worked up there and recently seen by my colleague Dr. Sarmiento in GI and referred to me.  I have reviewed his note as well as the notes available to me within epic.  She initially presented with wt loss and pain.      On September 11 this patient had a CAT scan that showed an ill-defined unchanged low-attenuation in the liver stable to May 2023.  There was biliary dilatation as well as pancreatic ductal dilatation.  There was a 4.4 cm masslike soft tissue fullness in the pancreatic head and an unchanged left adrenal nodule.  Subsequent to that a chest CT showed no evidence of disease in the mediastinum or chest.      Endoscopic evaluation in the form of endoscopic ultrasound and ERCP showed a bulging papilla and a severe biliary stricture which was stented.  There was a 3.69 m pancreatic head mass.  Both fine-needle aspiration biopsy and cytology have failed to show cancer.  1 specimen had scant tissue with duodenal mucosa, muscle, and inflammation.  The other it was limited atypical cells and acute and chronic inflammation.      Subsequent to that an MRI was done that showed no evidence of any worrisome liver lesions.  There was mild acute pancreatitis.  There was improved biliary dilatation as this was done  after the endoscopic intervention.  There was enlarged pancreatic head with pancreatic duct dilatation going to the level of the neck of the gland.  There is indeterminate lymphadenopathy.      This patient's 19-9 was 301 when her bilirubin was 1.8.  Her IgG4 is normal.  Her albumin was 3.5 in September.    Pain is better.  Has lost about 40 pounds.     She does not smoke.  She thinks her brother had some form of cancer.      PMH: Early Alzheimer's disease, hypertension, diabetes, hypothyroidism, hyperlipidemia, anemia    PSH: Right total knee replacement and cholecystectomy    Soc:  3 kids, 8 grandkids, 3 great grandkids    PE: This patient appears her stated age.  She is not jaundiced.  There is no palpable lymphadenopathy.  Her breath sounds are clear.  Her heart is regular without obvious murmur.  Her abdomen is soft and nontender with a right upper quadrant scar.  She has 2+ pedal pulses.    Data:  Able to review CT before clinic.  No obvious mets.  HOP mass like.  PD cut-off there.  Classic arterial anatomy.  Veins fine x one area of possible contour change.    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  I base that on her presentation, imaging, endoscopic evaluation, and laboratory work.  As noted above the endoscopic evaluation did not give us a diagnosis.  In fact it did not even give us the suggestion of atypical cells suspicious for cancer.    I have meant a long time today with this patient and 2 of her daughters.  I told them that I think the most likely diagnosis is pancreatic cancer.  Again based on the currently available data she would be described as localized and radiographically resectable.  Our center approach is the vast majority of patients with localized pancreatic cancer utilizing neoadjuvant chemotherapy.  I took the time and explained to this patient and her family why that is.  We also talked about a  surgery first approach for patients with diagnosed pancreatic cancer.  Again this patient does not have a diagnosis right now but we prefer to give 2 shots at diagnosis before proceeding to a surgery first approach.    We also talked about the ideal treatment for localized pancreatic cancer being multimodal.  That is a state includes surgery and chemotherapy, occasionally radiation therapy.  We furthermore talked about noncurative intent approaches such as chemo alone or chemotherapy with chemoradiation therapy.    I think this patient would be able to tolerate curative intent therapy and that is what she is interested in.  Unequivocally she is interested in trying to get preoperative chemotherapy and that will necessitate a repeat endoscopic evaluation.  For that I will be reaching out to Dr. Sarmiento who saw this patient earlier this week.  I will ask him to get this patient in with either himself or one of his colleagues next week at either the [facility] or [facility] to expedite this patient's care.    If this patient's repeat endoscopic evaluation does not give us a definitive diagnosis of pancreatic cancer then we will have a further discussion about a Whipple procedure.    I also hope to present this patient at our weekly pancreas imaging review conference next week    This note has been dictated with voice recognition software and has not been reviewed for grammar or content errors.

## 2023-10-07 ENCOUNTER — DOCUMENTATION (OUTPATIENT)
Dept: SURGICAL ONCOLOGY | Facility: HOSPITAL | Age: 68
End: 2023-10-07
Payer: MEDICAID

## 2023-10-07 NOTE — PROGRESS NOTES
Labs from clinic reviewed    TB has normalized post stent    19-9 now normalized at 22 post stenting...    Prealb 31    Alb 4.2    Cr 0.54.    Have corresponded with Dr Sarmiento re repeat endo eval

## 2023-10-08 DIAGNOSIS — K86.89 PANCREATIC MASS (HHS-HCC): Primary | ICD-10-CM

## 2023-10-11 ENCOUNTER — ANESTHESIA EVENT (OUTPATIENT)
Dept: GASTROENTEROLOGY | Facility: HOSPITAL | Age: 68
End: 2023-10-11
Payer: MEDICAID

## 2023-10-11 ENCOUNTER — DOCUMENTATION (OUTPATIENT)
Dept: SURGICAL ONCOLOGY | Facility: CLINIC | Age: 68
End: 2023-10-11
Payer: MEDICAID

## 2023-10-11 NOTE — ANESTHESIA PREPROCEDURE EVALUATION
Patient: Laura No    Procedure Information       Date/Time: 10/12/23 0930    Scheduled providers: Demetrio Sarmiento MD    Procedure: ENDOSCOPIC ULTRASOUND (UPPER)    Location: [facility]            Relevant Problems   Anesthesia (within normal limits)  Macgrath used to intubate patient      Cardiovascular   (+) HTN (hypertension)      Endocrine  A1c 10.8%   (+) Diabetes mellitus, type 2 (CMS/HCC)   (+) Hypothyroidism      GI   (+) Gastroesophageal reflux disease (controlled)      /Renal (within normal limits)      Neuro/Psych   (+) Dementia (CMS/HCC)      Pulmonary (within normal limits)      GI/Hepatic (within normal limits)  Elevated LFT no jaundice      Hematology (within normal limits)      Musculoskeletal   (+) Osteoarthritis      Eyes, Ears, Nose, and Throat (within normal limits)      Infectious Disease (within normal limits)      Digestive   (+) Pancreatic mass       Clinical information reviewed:               lizette No is a 68 year old woman with early Alzheimer's dementia, HTN, poorly-controlled type II diabetes mellitus (last HbA1c = 10.8% 9/22/23; insulin-requiring), hypothyroidism, hyperlipidemia, NCNC anemia, history of COVID 12/2020, depression, and 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 Siria Medrano - [facility] for “mass in pancreas” (per the Central Scheduling comment).   POCT glucose 139    NPO Detail:  No data recorded     Physical Exam    Airway  Mallampati: I  TM distance: >3 FB  Neck ROM: full     Cardiovascular - normal exam     Dental   (+) upper dentures, lower dentures     Pulmonary - normal exam     Abdominal          Anesthesia Plan    ASA 3     MAC and general     intravenous induction   Trial extubation is planned.  Anesthetic plan and risks discussed with patient.  Use of blood products discussed with patient who consented to blood products.    Plan discussed with CAA.

## 2023-10-11 NOTE — PROGRESS NOTES
We reviewed at panc imaging conference today.    By CT a sudden change in CBD caliber in HOP.  Change in PD caliber same locations, though less PD dil.   Mass-like HOP BUT lesion is a bit more hyperenhancing and not hypoenhancing.    MRI post stenting, much less austin dil.  Still with mild PD dil.  Diffsn restriction + BUT this can be seen in both CA and inflamm, which she has a little bit of.    Plans still in place for repeat endo eval.  If we get dx of PDAC, then MARIA DE JESUS.  If we get different, then OR will likely still be needed.  With normal IgG4, I would not want to attribute to that.

## 2023-10-12 ENCOUNTER — ANESTHESIA (OUTPATIENT)
Dept: GASTROENTEROLOGY | Facility: HOSPITAL | Age: 68
End: 2023-10-12
Payer: MEDICAID

## 2023-10-12 ENCOUNTER — HOSPITAL ENCOUNTER (OUTPATIENT)
Dept: RADIOLOGY | Facility: HOSPITAL | Age: 68
Setting detail: OUTPATIENT SURGERY
Discharge: HOME | End: 2023-10-12
Payer: MEDICAID

## 2023-10-12 ENCOUNTER — HOSPITAL ENCOUNTER (OUTPATIENT)
Dept: GASTROENTEROLOGY | Facility: HOSPITAL | Age: 68
Setting detail: OUTPATIENT SURGERY
Discharge: HOME | End: 2023-10-12
Payer: MEDICAID

## 2023-10-12 VITALS
DIASTOLIC BLOOD PRESSURE: 89 MMHG | OXYGEN SATURATION: 98 % | RESPIRATION RATE: 13 BRPM | WEIGHT: 152 LBS | SYSTOLIC BLOOD PRESSURE: 136 MMHG | TEMPERATURE: 97.2 F | HEIGHT: 61 IN | BODY MASS INDEX: 28.7 KG/M2 | HEART RATE: 76 BPM

## 2023-10-12 DIAGNOSIS — K86.89 PANCREATIC MASS (HHS-HCC): Primary | ICD-10-CM

## 2023-10-12 PROBLEM — E11.9 DIABETES MELLITUS, TYPE 2 (MULTI): Status: ACTIVE | Noted: 2023-10-12

## 2023-10-12 PROBLEM — M19.90 OSTEOARTHRITIS: Status: ACTIVE | Noted: 2023-10-12

## 2023-10-12 PROBLEM — F03.90 DEMENTIA (MULTI): Status: ACTIVE | Noted: 2023-10-12

## 2023-10-12 PROBLEM — I10 HTN (HYPERTENSION): Status: ACTIVE | Noted: 2023-10-12

## 2023-10-12 PROBLEM — E03.9 HYPOTHYROIDISM: Status: ACTIVE | Noted: 2023-10-12

## 2023-10-12 PROBLEM — K21.9 GASTROESOPHAGEAL REFLUX DISEASE: Status: ACTIVE | Noted: 2023-10-12

## 2023-10-12 LAB — GLUCOSE BLD MANUAL STRIP-MCNC: 139 MG/DL (ref 74–99)

## 2023-10-12 PROCEDURE — 2720000007 HC OR 272 NO HCPCS: Performed by: INTERNAL MEDICINE

## 2023-10-12 PROCEDURE — 88173 CYTOPATH EVAL FNA REPORT: CPT | Performed by: PATHOLOGY

## 2023-10-12 PROCEDURE — 3700000002 HC GENERAL ANESTHESIA TIME - EACH INCREMENTAL 1 MINUTE: Performed by: INTERNAL MEDICINE

## 2023-10-12 PROCEDURE — A43232 PR ESOPHAGOSCOPY INTRA/TRANSMURAL NEEDLE ASPIRAT/BX: Performed by: ANESTHESIOLOGY

## 2023-10-12 PROCEDURE — 7100000002 HC RECOVERY ROOM TIME - EACH INCREMENTAL 1 MINUTE: Performed by: INTERNAL MEDICINE

## 2023-10-12 PROCEDURE — 88173 CYTOPATH EVAL FNA REPORT: CPT | Mod: TC,MCY,CMCLAB | Performed by: INTERNAL MEDICINE

## 2023-10-12 PROCEDURE — 88307 TISSUE EXAM BY PATHOLOGIST: CPT | Mod: TC,SUR,PARLAB | Performed by: INTERNAL MEDICINE

## 2023-10-12 PROCEDURE — 7100000010 HC PHASE TWO TIME - EACH INCREMENTAL 1 MINUTE: Performed by: INTERNAL MEDICINE

## 2023-10-12 PROCEDURE — 88305 TISSUE EXAM BY PATHOLOGIST: CPT | Mod: TC,MCY,CMCLAB | Performed by: INTERNAL MEDICINE

## 2023-10-12 PROCEDURE — 74019 RADEX ABDOMEN 2 VIEWS: CPT | Performed by: RADIOLOGY

## 2023-10-12 PROCEDURE — 74019 RADEX ABDOMEN 2 VIEWS: CPT | Mod: FY

## 2023-10-12 PROCEDURE — 2500000004 HC RX 250 GENERAL PHARMACY W/ HCPCS (ALT 636 FOR OP/ED): Mod: SE | Performed by: ANESTHESIOLOGIST ASSISTANT

## 2023-10-12 PROCEDURE — 2500000004 HC RX 250 GENERAL PHARMACY W/ HCPCS (ALT 636 FOR OP/ED): Mod: SE

## 2023-10-12 PROCEDURE — 7100000001 HC RECOVERY ROOM TIME - INITIAL BASE CHARGE: Performed by: INTERNAL MEDICINE

## 2023-10-12 PROCEDURE — 2500000005 HC RX 250 GENERAL PHARMACY W/O HCPCS: Mod: SE | Performed by: ANESTHESIOLOGIST ASSISTANT

## 2023-10-12 PROCEDURE — 82947 ASSAY GLUCOSE BLOOD QUANT: CPT

## 2023-10-12 PROCEDURE — 2580000001 HC RX 258 IV SOLUTIONS: Mod: SE | Performed by: ANESTHESIOLOGY

## 2023-10-12 PROCEDURE — A43232 PR ESOPHAGOSCOPY INTRA/TRANSMURAL NEEDLE ASPIRAT/BX

## 2023-10-12 PROCEDURE — 43235 EGD DIAGNOSTIC BRUSH WASH: CPT | Performed by: INTERNAL MEDICINE

## 2023-10-12 PROCEDURE — 88307 TISSUE EXAM BY PATHOLOGIST: CPT | Performed by: STUDENT IN AN ORGANIZED HEALTH CARE EDUCATION/TRAINING PROGRAM

## 2023-10-12 PROCEDURE — 88305 TISSUE EXAM BY PATHOLOGIST: CPT | Performed by: PATHOLOGY

## 2023-10-12 PROCEDURE — 2580000001 HC RX 258 IV SOLUTIONS: Mod: SE | Performed by: INTERNAL MEDICINE

## 2023-10-12 PROCEDURE — 2500000005 HC RX 250 GENERAL PHARMACY W/O HCPCS: Mod: SE

## 2023-10-12 PROCEDURE — 7100000009 HC PHASE TWO TIME - INITIAL BASE CHARGE: Performed by: INTERNAL MEDICINE

## 2023-10-12 PROCEDURE — 3700000001 HC GENERAL ANESTHESIA TIME - INITIAL BASE CHARGE: Performed by: INTERNAL MEDICINE

## 2023-10-12 PROCEDURE — 43238 EGD US FINE NEEDLE BX/ASPIR: CPT | Performed by: INTERNAL MEDICINE

## 2023-10-12 RX ORDER — ROCURONIUM BROMIDE 10 MG/ML
INJECTION, SOLUTION INTRAVENOUS AS NEEDED
Status: DISCONTINUED | OUTPATIENT
Start: 2023-10-12 | End: 2023-10-12

## 2023-10-12 RX ORDER — SODIUM CHLORIDE, SODIUM LACTATE, POTASSIUM CHLORIDE, CALCIUM CHLORIDE 600; 310; 30; 20 MG/100ML; MG/100ML; MG/100ML; MG/100ML
100 INJECTION, SOLUTION INTRAVENOUS CONTINUOUS
Status: DISCONTINUED | OUTPATIENT
Start: 2023-10-12 | End: 2023-10-20 | Stop reason: HOSPADM

## 2023-10-12 RX ORDER — ONDANSETRON HYDROCHLORIDE 2 MG/ML
INJECTION, SOLUTION INTRAVENOUS AS NEEDED
Status: DISCONTINUED | OUTPATIENT
Start: 2023-10-12 | End: 2023-10-12

## 2023-10-12 RX ORDER — SODIUM CHLORIDE, SODIUM LACTATE, POTASSIUM CHLORIDE, CALCIUM CHLORIDE 600; 310; 30; 20 MG/100ML; MG/100ML; MG/100ML; MG/100ML
20 INJECTION, SOLUTION INTRAVENOUS CONTINUOUS
Status: DISCONTINUED | OUTPATIENT
Start: 2023-10-12 | End: 2023-10-20 | Stop reason: HOSPADM

## 2023-10-12 RX ORDER — MIDAZOLAM HYDROCHLORIDE 1 MG/ML
INJECTION, SOLUTION INTRAMUSCULAR; INTRAVENOUS AS NEEDED
Status: DISCONTINUED | OUTPATIENT
Start: 2023-10-12 | End: 2023-10-12

## 2023-10-12 RX ORDER — DEXAMETHASONE SODIUM PHOSPHATE 100 MG/10ML
INJECTION INTRAMUSCULAR; INTRAVENOUS AS NEEDED
Status: DISCONTINUED | OUTPATIENT
Start: 2023-10-12 | End: 2023-10-12

## 2023-10-12 RX ORDER — ALBUTEROL SULFATE 0.83 MG/ML
2.5 SOLUTION RESPIRATORY (INHALATION) ONCE
Status: DISCONTINUED | OUTPATIENT
Start: 2023-10-12 | End: 2023-10-20 | Stop reason: HOSPADM

## 2023-10-12 RX ORDER — SODIUM CHLORIDE, SODIUM LACTATE, POTASSIUM CHLORIDE, CALCIUM CHLORIDE 600; 310; 30; 20 MG/100ML; MG/100ML; MG/100ML; MG/100ML
50 INJECTION, SOLUTION INTRAVENOUS CONTINUOUS
Status: DISCONTINUED | OUTPATIENT
Start: 2023-10-12 | End: 2023-10-20 | Stop reason: HOSPADM

## 2023-10-12 RX ORDER — LIDOCAINE HYDROCHLORIDE 10 MG/ML
0.1 INJECTION, SOLUTION EPIDURAL; INFILTRATION; INTRACAUDAL; PERINEURAL ONCE
Status: CANCELLED | OUTPATIENT
Start: 2023-10-12 | End: 2023-10-12

## 2023-10-12 RX ORDER — ONDANSETRON HYDROCHLORIDE 2 MG/ML
4 INJECTION, SOLUTION INTRAVENOUS ONCE AS NEEDED
Status: CANCELLED | OUTPATIENT
Start: 2023-10-12

## 2023-10-12 RX ORDER — LIDOCAINE HYDROCHLORIDE 20 MG/ML
INJECTION, SOLUTION INFILTRATION; PERINEURAL AS NEEDED
Status: DISCONTINUED | OUTPATIENT
Start: 2023-10-12 | End: 2023-10-12

## 2023-10-12 RX ORDER — PROPOFOL 10 MG/ML
INJECTION, EMULSION INTRAVENOUS AS NEEDED
Status: DISCONTINUED | OUTPATIENT
Start: 2023-10-12 | End: 2023-10-12

## 2023-10-12 RX ORDER — FENTANYL CITRATE 50 UG/ML
50 INJECTION, SOLUTION INTRAMUSCULAR; INTRAVENOUS ONCE
Status: DISCONTINUED | OUTPATIENT
Start: 2023-10-12 | End: 2023-10-20 | Stop reason: HOSPADM

## 2023-10-12 RX ORDER — SODIUM CHLORIDE, SODIUM LACTATE, POTASSIUM CHLORIDE, CALCIUM CHLORIDE 600; 310; 30; 20 MG/100ML; MG/100ML; MG/100ML; MG/100ML
100 INJECTION, SOLUTION INTRAVENOUS CONTINUOUS
Status: CANCELLED | OUTPATIENT
Start: 2023-10-12

## 2023-10-12 RX ORDER — FENTANYL CITRATE 50 UG/ML
INJECTION, SOLUTION INTRAMUSCULAR; INTRAVENOUS AS NEEDED
Status: DISCONTINUED | OUTPATIENT
Start: 2023-10-12 | End: 2023-10-12

## 2023-10-12 RX ORDER — ONDANSETRON HYDROCHLORIDE 2 MG/ML
4 INJECTION, SOLUTION INTRAVENOUS ONCE AS NEEDED
Status: DISCONTINUED | OUTPATIENT
Start: 2023-10-12 | End: 2023-10-20 | Stop reason: HOSPADM

## 2023-10-12 RX ORDER — LIDOCAINE HYDROCHLORIDE 10 MG/ML
0.1 INJECTION, SOLUTION EPIDURAL; INFILTRATION; INTRACAUDAL; PERINEURAL ONCE
Status: DISCONTINUED | OUTPATIENT
Start: 2023-10-12 | End: 2023-10-20 | Stop reason: HOSPADM

## 2023-10-12 RX ADMIN — ROCURONIUM BROMIDE 50 MG: 10 INJECTION, SOLUTION INTRAVENOUS at 11:27

## 2023-10-12 RX ADMIN — ONDANSETRON 4 MG: 2 INJECTION INTRAMUSCULAR; INTRAVENOUS at 12:38

## 2023-10-12 RX ADMIN — MIDAZOLAM 2 MG: 1 INJECTION INTRAMUSCULAR; INTRAVENOUS at 11:19

## 2023-10-12 RX ADMIN — SUGAMMADEX 200 MG: 100 INJECTION, SOLUTION INTRAVENOUS at 12:40

## 2023-10-12 RX ADMIN — PROPOFOL 200 MG: 10 INJECTION, EMULSION INTRAVENOUS at 11:27

## 2023-10-12 RX ADMIN — FENTANYL CITRATE 50 MCG: 50 INJECTION, SOLUTION INTRAMUSCULAR; INTRAVENOUS at 11:27

## 2023-10-12 RX ADMIN — SODIUM CHLORIDE, POTASSIUM CHLORIDE, SODIUM LACTATE AND CALCIUM CHLORIDE: 600; 310; 30; 20 INJECTION, SOLUTION INTRAVENOUS at 11:20

## 2023-10-12 RX ADMIN — SODIUM CHLORIDE, POTASSIUM CHLORIDE, SODIUM LACTATE AND CALCIUM CHLORIDE 20 ML/HR: 600; 310; 30; 20 INJECTION, SOLUTION INTRAVENOUS at 08:55

## 2023-10-12 RX ADMIN — FENTANYL CITRATE 50 MCG: 50 INJECTION, SOLUTION INTRAMUSCULAR; INTRAVENOUS at 11:49

## 2023-10-12 RX ADMIN — LIDOCAINE HYDROCHLORIDE 60 MG: 20 INJECTION, SOLUTION INFILTRATION; PERINEURAL at 11:27

## 2023-10-12 RX ADMIN — DEXAMETHASONE SODIUM PHOSPHATE 4 MG: 10 INJECTION INTRAMUSCULAR; INTRAVENOUS at 11:27

## 2023-10-12 ASSESSMENT — PAIN SCALES - GENERAL: PAINLEVEL_OUTOF10: 7

## 2023-10-12 ASSESSMENT — COLUMBIA-SUICIDE SEVERITY RATING SCALE - C-SSRS
1. IN THE PAST MONTH, HAVE YOU WISHED YOU WERE DEAD OR WISHED YOU COULD GO TO SLEEP AND NOT WAKE UP?: NO
6. HAVE YOU EVER DONE ANYTHING, STARTED TO DO ANYTHING, OR PREPARED TO DO ANYTHING TO END YOUR LIFE?: NO
2. HAVE YOU ACTUALLY HAD ANY THOUGHTS OF KILLING YOURSELF?: NO

## 2023-10-12 ASSESSMENT — PAIN - FUNCTIONAL ASSESSMENT: PAIN_FUNCTIONAL_ASSESSMENT: 0-10

## 2023-10-12 NOTE — ANESTHESIA POSTPROCEDURE EVALUATION
Patient: Laura No    Procedure Summary       Date: 10/12/23 Room / Location: [facility]    Anesthesia Start: 1120 Anesthesia Stop: 1252    Procedure: ENDOSCOPIC ULTRASOUND (UPPER) Diagnosis:       Pancreatic mass      Pancreatic mass    Scheduled Providers: Demetrio Sarmiento MD; Afshan Bentley RN; Kylee Zapata MD Responsible Provider: Kylee Zapata MD    Anesthesia Type: MAC ASA Status: 3            Anesthesia Type: MAC    Vitals Value Taken Time   /84 10/12/23 1403   Temp  10/12/23 1443   Pulse 87 10/12/23 1403   Resp 15 10/12/23 1403   SpO2 99 % 10/12/23 1403       Anesthesia Post Evaluation    Patient location during evaluation: PACU  Patient participation: complete - patient participated  Level of consciousness: awake  Cardiovascular status: acceptable  Respiratory status: acceptable  Hydration status: acceptable        Encounter Notable Events   Notable Event Outcome Phase Comment   Prolonged neuraxial blockade  Intraprocedure

## 2023-10-12 NOTE — RESULT ENCOUNTER NOTE
Images were personally reviewed by me prior to patient leaving. Concur with radiologist's interpretation.

## 2023-10-12 NOTE — ANESTHESIA PROCEDURE NOTES
Airway  Date/Time: 10/12/2023 11:41 AM  Urgency: elective    Airway not difficult    Staffing  Performed: TAMARA   Authorized by: Kylee Zapata MD    Performed by: TAMARA Addison  Patient location during procedure: OR    Indications and Patient Condition  Indications for airway management: anesthesia  Spontaneous ventilation: present  Sedation level: deep  Preoxygenated: yes  Patient position: sniffing  MILS maintained throughout  Mask difficulty assessment: 1 - vent by mask  Planned trial extubation    Final Airway Details  Final airway type: endotracheal airway      Successful airway: ETT  Cuffed: yes   Successful intubation technique: direct laryngoscopy  Endotracheal tube insertion site: oral  Blade: Megan  Blade size: #3  ETT size (mm): 7.0  Cormack-Lehane Classification: grade I - full view of glottis  Placement verified by: chest auscultation and capnometry   Inital cuff pressure (cm H2O): 6  Measured from: lips  ETT to lips (cm): 22  Number of attempts at approach: 1

## 2023-10-12 NOTE — ANESTHESIA PROCEDURE NOTES
Peripheral IV  Date/Time: 10/12/2023 10:44 AM      Placement  Needle size: 22 G  Laterality: right  Location: hand  Local anesthetic: none  Site prep: chlorhexidine  Technique: anatomical landmarks  Attempts: 1

## 2023-10-13 ENCOUNTER — TELEPHONE (OUTPATIENT)
Dept: SURGICAL ONCOLOGY | Facility: HOSPITAL | Age: 68
End: 2023-10-13
Payer: MEDICAID

## 2023-10-13 ENCOUNTER — APPOINTMENT (OUTPATIENT)
Dept: GASTROENTEROLOGY | Facility: CLINIC | Age: 68
End: 2023-10-13
Payer: MEDICAID

## 2023-10-13 LAB
LABORATORY COMMENT REPORT: NORMAL
LABORATORY COMMENT REPORT: NORMAL
PATH REPORT.FINAL DX SPEC: NORMAL
PATH REPORT.GROSS SPEC: NORMAL
PATH REPORT.INTRAOP OBS SPEC DOC: NORMAL
PATH REPORT.TOTAL CANCER: NORMAL

## 2023-10-13 ASSESSMENT — PAIN SCALES - GENERAL: PAINLEVEL_OUTOF10: 0 - NO PAIN

## 2023-10-13 NOTE — ADDENDUM NOTE
Encounter addended by: Nichole Ibarra RN on: 10/13/2023 9:41 AM   Actions taken: Contacts section saved, Flowsheet accepted

## 2023-10-13 NOTE — TELEPHONE ENCOUNTER
The pt daughter called stating that her mother had the ERCP/EUS yesterday. She was concerned as it was noted that the mass had grown in size. The pt and her daughter agree that she would like surgery to remove it. Dr. Dia updated and the pt has a follow up appt. The daughter is aware we are awaiting the bx results. She was reassured we will follow up on the results.

## 2023-10-16 ENCOUNTER — APPOINTMENT (OUTPATIENT)
Dept: GASTROENTEROLOGY | Facility: HOSPITAL | Age: 68
End: 2023-10-16
Payer: MEDICAID

## 2023-10-18 LAB
LABORATORY COMMENT REPORT: NORMAL
PATH REPORT.FINAL DX SPEC: NORMAL
PATH REPORT.GROSS SPEC: NORMAL
PATH REPORT.TOTAL CANCER: NORMAL

## 2023-10-18 NOTE — H&P (VIEW-ONLY)
Reason for visit:  Follow-Up after Repeat Endo Eval    HPI:  Initially saw pt 10/6 with concern for PDAC.  Full details in that clinic note.  Lab update 10/7.  Conference review 10/11.    EUS 10/12:    Result Text   Impression  Single irregular and hypoechoic mass measuring 45 mm x 25 mm with poorly defined and irregular margins was visualized in the head of the pancreas. Apparent abutment of the portal confluence; 6 fine needle biopsy passes were taken. Onsite cytologist was present and cytology results were preliminarily atypical  Bile duct was visualized     Path from EUS:    A. PANCREAS MASS FINE NEEDLE ASPIRATION - Head of Pancreas Mass, CYTOLOGY AND CELL BLOCK:                                   Nondiagnostic specimen due to insufficient cellularity    A. PANCREAS BIOPSY:    --Blood and fragments of benign pancreatic parenchyma, see note     Note: Portion of biopsy contains fibrotic tissue and lymphocytes. There is no morphologic evidence of carcinoma or dysplasia in this biopsy. Multiple deeper levels examined.    Ms. No is accompanied by one of her daughters.  She feels well today.  She has no complaints.    Today I reviewed in detail again for this patient and her daughter how we got to where we are right now.  We reviewed her initial work at the [facility] then our laboratory work and our follow-up endoscopic procedure.  We have a clinical concern of pancreatic cancer but no tissue diagnosis.       PE: Her vitals are in the chart.  She is not jaundiced.  Her abdomen is soft and nontender with a right upper quadrant incision      Impression / Plan:    This 68-year-old woman has a clinical picture very worrisome for pancreatic head cancer.  This is based on her presentation, her imaging, and her endoscopic evaluation.  We do not have a tissue diagnosis.  Her IgG4 was normal.  Her 19-9 did normalize with a normalization of her bilirubin.    The options for this patient are surveillance on a frequent  interval or resection as both a diagnostic and therapeutic modality.  That resection of course is a Whipple.    I have talked with this patient and her daughter at length about both of those options.  I used pictorial aids and described in detail what is involved in a Whipple procedure.  We talked about the typical morbidity and mortality associated with this operation as well as the recovery.  We talked about the most common post pancreatectomy complications which include but are not limited to bleeding, pain, infection, fistula, hernia, delayed return of GI function, and need for other procedures or interventions.  We talked about the need for chemotherapy should she end up having cancer.    I did quote her a 1 to 3% 30-day mortality rate and a 4 to 5% 90-day mortality rate.  The overall complication rate is about 50% and again we reviewed the typical post pancreatectomy complications.    This patient was able to verbalize to me in return everything that I said and wishes to proceed with surgery.  At her request we are doing this on Monday, November 6.  She signed an E consent.  She will be seen in preadmission testing.  We will get a pancreas protocol CAT scan and review her again at clinic.  I am doing that CAT scan given the follow-up endoscopic intervention.    This note has been dictated with voice recognition software and has not been reviewed for grammar or content errors.

## 2023-10-18 NOTE — PROGRESS NOTES
Reason for visit:  Follow-Up after Repeat Endo Eval    HPI:  Initially saw pt 10/6 with concern for PDAC.  Full details in that clinic note.  Lab update 10/7.  Conference review 10/11.    EUS 10/12:    Result Text   Impression  Single irregular and hypoechoic mass measuring 45 mm x 25 mm with poorly defined and irregular margins was visualized in the head of the pancreas. Apparent abutment of the portal confluence; 6 fine needle biopsy passes were taken. Onsite cytologist was present and cytology results were preliminarily atypical  Bile duct was visualized     Path from EUS:    A. PANCREAS MASS FINE NEEDLE ASPIRATION - Head of Pancreas Mass, CYTOLOGY AND CELL BLOCK:                                   Nondiagnostic specimen due to insufficient cellularity    A. PANCREAS BIOPSY:    --Blood and fragments of benign pancreatic parenchyma, see note     Note: Portion of biopsy contains fibrotic tissue and lymphocytes. There is no morphologic evidence of carcinoma or dysplasia in this biopsy. Multiple deeper levels examined.    Ms. No is accompanied by one of her daughters.  She feels well today.  She has no complaints.    Today I reviewed in detail again for this patient and her daughter how we got to where we are right now.  We reviewed her initial work at the [facility] then our laboratory work and our follow-up endoscopic procedure.  We have a clinical concern of pancreatic cancer but no tissue diagnosis.       PE: Her vitals are in the chart.  She is not jaundiced.  Her abdomen is soft and nontender with a right upper quadrant incision      Impression / Plan:    This 68-year-old woman has a clinical picture very worrisome for pancreatic head cancer.  This is based on her presentation, her imaging, and her endoscopic evaluation.  We do not have a tissue diagnosis.  Her IgG4 was normal.  Her 19-9 did normalize with a normalization of her bilirubin.    The options for this patient are surveillance on a frequent  interval or resection as both a diagnostic and therapeutic modality.  That resection of course is a Whipple.    I have talked with this patient and her daughter at length about both of those options.  I used pictorial aids and described in detail what is involved in a Whipple procedure.  We talked about the typical morbidity and mortality associated with this operation as well as the recovery.  We talked about the most common post pancreatectomy complications which include but are not limited to bleeding, pain, infection, fistula, hernia, delayed return of GI function, and need for other procedures or interventions.  We talked about the need for chemotherapy should she end up having cancer.    I did quote her a 1 to 3% 30-day mortality rate and a 4 to 5% 90-day mortality rate.  The overall complication rate is about 50% and again we reviewed the typical post pancreatectomy complications.    This patient was able to verbalize to me in return everything that I said and wishes to proceed with surgery.  At her request we are doing this on Monday, November 6.  She signed an E consent.  She will be seen in preadmission testing.  We will get a pancreas protocol CAT scan and review her again at clinic.  I am doing that CAT scan given the follow-up endoscopic intervention.    This note has been dictated with voice recognition software and has not been reviewed for grammar or content errors.

## 2023-10-19 ENCOUNTER — OFFICE VISIT (OUTPATIENT)
Dept: SURGERY | Facility: CLINIC | Age: 68
End: 2023-10-19
Payer: MEDICAID

## 2023-10-19 ENCOUNTER — APPOINTMENT (OUTPATIENT)
Dept: GASTROENTEROLOGY | Facility: HOSPITAL | Age: 68
End: 2023-10-19
Payer: MEDICAID

## 2023-10-19 ENCOUNTER — APPOINTMENT (OUTPATIENT)
Dept: SURGERY | Facility: CLINIC | Age: 68
End: 2023-10-19
Payer: MEDICAID

## 2023-10-19 VITALS
OXYGEN SATURATION: 99 % | WEIGHT: 158 LBS | DIASTOLIC BLOOD PRESSURE: 73 MMHG | TEMPERATURE: 97.9 F | HEIGHT: 65 IN | SYSTOLIC BLOOD PRESSURE: 117 MMHG | BODY MASS INDEX: 26.33 KG/M2 | HEART RATE: 93 BPM

## 2023-10-19 DIAGNOSIS — K86.89 PANCREATIC MASS (HHS-HCC): Primary | ICD-10-CM

## 2023-10-19 DIAGNOSIS — K86.89 PANCREATIC MASS (HHS-HCC): ICD-10-CM

## 2023-10-19 PROCEDURE — 1159F MED LIST DOCD IN RCRD: CPT | Performed by: SURGERY

## 2023-10-19 PROCEDURE — 3078F DIAST BP <80 MM HG: CPT | Performed by: SURGERY

## 2023-10-19 PROCEDURE — 1160F RVW MEDS BY RX/DR IN RCRD: CPT | Performed by: SURGERY

## 2023-10-19 PROCEDURE — 1125F AMNT PAIN NOTED PAIN PRSNT: CPT | Performed by: SURGERY

## 2023-10-19 PROCEDURE — 99215 OFFICE O/P EST HI 40 MIN: CPT | Performed by: SURGERY

## 2023-10-19 PROCEDURE — 1036F TOBACCO NON-USER: CPT | Performed by: SURGERY

## 2023-10-19 PROCEDURE — 4010F ACE/ARB THERAPY RXD/TAKEN: CPT | Performed by: SURGERY

## 2023-10-19 PROCEDURE — 3074F SYST BP LT 130 MM HG: CPT | Performed by: SURGERY

## 2023-10-19 RX ORDER — HEPARIN SODIUM 5000 [USP'U]/ML
5000 INJECTION, SOLUTION INTRAVENOUS; SUBCUTANEOUS ONCE
Status: CANCELLED | OUTPATIENT
Start: 2023-10-19 | End: 2023-10-19

## 2023-10-19 ASSESSMENT — PAIN SCALES - GENERAL: PAINLEVEL: 8

## 2023-10-23 ENCOUNTER — HOSPITAL ENCOUNTER (OUTPATIENT)
Dept: RADIOLOGY | Facility: HOSPITAL | Age: 68
Discharge: HOME | End: 2023-10-23
Payer: MEDICAID

## 2023-10-23 DIAGNOSIS — K86.89 PANCREATIC MASS (HHS-HCC): ICD-10-CM

## 2023-10-23 PROCEDURE — 2550000001 HC RX 255 CONTRASTS: Mod: SE | Performed by: SURGERY

## 2023-10-23 PROCEDURE — 74177 CT ABD & PELVIS W/CONTRAST: CPT | Performed by: RADIOLOGY

## 2023-10-23 PROCEDURE — 74177 CT ABD & PELVIS W/CONTRAST: CPT

## 2023-10-23 RX ADMIN — IOHEXOL 79 ML: 350 INJECTION, SOLUTION INTRAVENOUS at 14:12

## 2023-10-25 ENCOUNTER — APPOINTMENT (OUTPATIENT)
Dept: SURGERY | Facility: CLINIC | Age: 68
End: 2023-10-25
Payer: MEDICAID

## 2023-10-27 ENCOUNTER — TELEMEDICINE CLINICAL SUPPORT (OUTPATIENT)
Dept: PREADMISSION TESTING | Facility: HOSPITAL | Age: 68
End: 2023-10-27
Payer: MEDICAID

## 2023-11-02 ENCOUNTER — APPOINTMENT (OUTPATIENT)
Dept: PREADMISSION TESTING | Facility: HOSPITAL | Age: 68
End: 2023-11-02
Payer: MEDICAID

## 2023-11-03 ENCOUNTER — HOSPITAL ENCOUNTER (OUTPATIENT)
Dept: CARDIOLOGY | Facility: HOSPITAL | Age: 68
Discharge: HOME | End: 2023-11-03
Payer: MEDICAID

## 2023-11-03 ENCOUNTER — PRE-ADMISSION TESTING (OUTPATIENT)
Dept: PREADMISSION TESTING | Facility: HOSPITAL | Age: 68
End: 2023-11-03
Payer: MEDICAID

## 2023-11-03 VITALS
WEIGHT: 159 LBS | SYSTOLIC BLOOD PRESSURE: 152 MMHG | HEIGHT: 65 IN | TEMPERATURE: 97.3 F | HEART RATE: 95 BPM | BODY MASS INDEX: 26.49 KG/M2 | DIASTOLIC BLOOD PRESSURE: 80 MMHG

## 2023-11-03 DIAGNOSIS — K86.89 PANCREATIC MASS (HHS-HCC): Primary | ICD-10-CM

## 2023-11-03 DIAGNOSIS — K86.89 PANCREATIC MASS (HHS-HCC): ICD-10-CM

## 2023-11-03 LAB
ABO GROUP (TYPE) IN BLOOD: NORMAL
ALBUMIN SERPL BCP-MCNC: 4.6 G/DL (ref 3.4–5)
ALP SERPL-CCNC: 122 U/L (ref 33–136)
ALT SERPL W P-5'-P-CCNC: 23 U/L (ref 7–45)
ANION GAP SERPL CALC-SCNC: 18 MMOL/L (ref 10–20)
ANTIBODY SCREEN: NORMAL
APTT PPP: 41 SECONDS (ref 27–38)
AST SERPL W P-5'-P-CCNC: 24 U/L (ref 9–39)
BILIRUB SERPL-MCNC: 0.7 MG/DL (ref 0–1.2)
BUN SERPL-MCNC: 12 MG/DL (ref 6–23)
CALCIUM SERPL-MCNC: 10.6 MG/DL (ref 8.6–10.6)
CHLORIDE SERPL-SCNC: 110 MMOL/L (ref 98–107)
CO2 SERPL-SCNC: 16 MMOL/L (ref 21–32)
CREAT SERPL-MCNC: 0.49 MG/DL (ref 0.5–1.05)
ERYTHROCYTE [DISTWIDTH] IN BLOOD BY AUTOMATED COUNT: 15 % (ref 11.5–14.5)
GFR SERPL CREATININE-BSD FRML MDRD: >90 ML/MIN/1.73M*2
GLUCOSE SERPL-MCNC: 108 MG/DL (ref 74–99)
HCT VFR BLD AUTO: 42.1 % (ref 36–46)
HGB BLD-MCNC: 13.5 G/DL (ref 12–16)
INR PPP: 1 (ref 0.9–1.1)
MCH RBC QN AUTO: 31 PG (ref 26–34)
MCHC RBC AUTO-ENTMCNC: 32.1 G/DL (ref 32–36)
MCV RBC AUTO: 97 FL (ref 80–100)
NRBC BLD-RTO: 0 /100 WBCS (ref 0–0)
PLATELET # BLD AUTO: 272 X10*3/UL (ref 150–450)
POTASSIUM SERPL-SCNC: 4.2 MMOL/L (ref 3.5–5.3)
PROT SERPL-MCNC: 7.5 G/DL (ref 6.4–8.2)
PROTHROMBIN TIME: 10.9 SECONDS (ref 9.8–12.8)
RBC # BLD AUTO: 4.36 X10*6/UL (ref 4–5.2)
RH FACTOR (ANTIGEN D): NORMAL
SODIUM SERPL-SCNC: 140 MMOL/L (ref 136–145)
WBC # BLD AUTO: 5.8 X10*3/UL (ref 4.4–11.3)

## 2023-11-03 PROCEDURE — 36415 COLL VENOUS BLD VENIPUNCTURE: CPT

## 2023-11-03 PROCEDURE — 93010 ELECTROCARDIOGRAM REPORT: CPT | Performed by: INTERNAL MEDICINE

## 2023-11-03 PROCEDURE — 85730 THROMBOPLASTIN TIME PARTIAL: CPT

## 2023-11-03 PROCEDURE — 99205 OFFICE O/P NEW HI 60 MIN: CPT | Performed by: NURSE PRACTITIONER

## 2023-11-03 PROCEDURE — 85027 COMPLETE CBC AUTOMATED: CPT

## 2023-11-03 PROCEDURE — 93005 ELECTROCARDIOGRAM TRACING: CPT

## 2023-11-03 PROCEDURE — 80053 COMPREHEN METABOLIC PANEL: CPT

## 2023-11-03 PROCEDURE — 86850 RBC ANTIBODY SCREEN: CPT

## 2023-11-03 ASSESSMENT — DUKE ACTIVITY SCORE INDEX (DASI)
CAN YOU DO LIGHT WORK AROUND THE HOUSE LIKE DUSTING OR WASHING DISHES: YES
CAN YOU CLIMB A FLIGHT OF STAIRS OR WALK UP A HILL: NO
CAN YOU HAVE SEXUAL RELATIONS: NO
CAN YOU WALK A BLOCK OR TWO ON LEVEL GROUND: NO
CAN YOU TAKE CARE OF YOURSELF (EAT, DRESS, BATHE, OR USE TOILET): YES
TOTAL_SCORE: 7.2
CAN YOU WALK INDOORS, SUCH AS AROUND YOUR HOUSE: YES
DASI METS SCORE: 3.6
CAN YOU DO MODERATE WORK AROUND THE HOUSE LIKE VACUUMING, SWEEPING FLOORS OR CARRYING GROCERIES: NO
CAN YOU RUN A SHORT DISTANCE: NO
CAN YOU PARTICIPATE IN MODERATE RECREATIONAL ACTIVITIES LIKE GOLF, BOWLING, DANCING, DOUBLES TENNIS OR THROWING A BASEBALL OR FOOTBALL: NO
CAN YOU DO HEAVY WORK AROUND THE HOUSE LIKE SCRUBBING FLOORS OR LIFTING AND MOVING HEAVY FURNITURE: NO
CAN YOU PARTICIPATE IN STRENOUS SPORTS LIKE SWIMMING, SINGLES TENNIS, FOOTBALL, BASKETBALL, OR SKIING: NO
CAN YOU DO YARD WORK LIKE RAKING LEAVES, WEEDING OR PUSHING A MOWER: NO

## 2023-11-03 ASSESSMENT — CHADS2 SCORE
HYPERTENSION: YES
CHF: NO
AGE GREATER THAN OR EQUAL TO 75: NO
PRIOR STROKE OR TIA OR THROMBOEMBOLISM: NO
DIABETES: YES
CHADS2 SCORE: 2

## 2023-11-03 ASSESSMENT — ENCOUNTER SYMPTOMS
ENDOCRINE NEGATIVE: 1
RESPIRATORY NEGATIVE: 1
NEUROLOGICAL NEGATIVE: 1
ARTHRALGIAS: 1
CONSTITUTIONAL NEGATIVE: 1
CONSTIPATION: 1
CARDIOVASCULAR NEGATIVE: 1
NECK NEGATIVE: 1

## 2023-11-03 ASSESSMENT — LIFESTYLE VARIABLES: SMOKING_STATUS: NONSMOKER

## 2023-11-03 NOTE — CPM/PAT H&P
CPM/PAT Evaluation       Name: Laura No (Laura No)  /Age: 1955/68 y.o.     Visit Type:   In-Person       Chief Complaint: Pancreatic Mass    HPI  Pt is a 68 year old female with a PMHx significant for Alzheimer's dementia, HTN, diabetes mellitus, hypothyroidism, hyperlipidemia, anemia, history of COVID 2020, depression, and DJD s/p Right knee replacement 2008 and a Pancreatic Mass.  Pt is being evaluated in [facility] in anticipation of a WHIPPLE Procedure with Dr. Dia on 23.    Past Medical History:   Diagnosis Date    Anemia     Anxiety     Arthritis     Cataract     Dementia (CMS/[facility])     daughter states mild    Depression     Diabetes (CMS/[facility])     GERD (gastroesophageal reflux disease)     Glaucoma     Hyperlipidemia     Hypertension     Hypothyroidism     Pancreatic cancer (CMS/[facility])     scheduled for surgery 2023    Vision loss     glasses       Past Surgical History:   Procedure Laterality Date    CHOLECYSTECTOMY      ERCP      KNEE ARTHROPLASTY Right        Patient  has no history on file for sexual activity.    Family History   Problem Relation Name Age of Onset    Coronary artery disease Mother      Diabetes Mother      Coronary artery disease Father      Other (MI) Brother         No Known Allergies    Prior to Admission medications    Medication Sig Start Date End Date Taking? Authorizing Provider   acetaminophen (Tylenol) 325 mg tablet  23   Historical Provider, MD   amLODIPine (Norvasc) 10 mg tablet Take 1 tablet (10 mg) by mouth once daily. 23   Historical Provider, MD   atorvastatin (Lipitor) 80 mg tablet Take 1 tablet (80 mg) by mouth once daily. 23   Historical Provider, MD   clonazePAM (KlonoPIN) 0.5 mg tablet Take 1 tablet (0.5 mg) by mouth 2 times a day. 23   Historical Provider, MD Lopez 12,000-38,000 -60,000 unit capsule  23   Historical Provider, MD   cyclobenzaprine (Flexeril) 10 mg tablet Take 1 tablet (10 mg) by mouth 2 times a day  as needed. 9/6/23   Historical Provider, MD   diclofenac sodium (Voltaren) 1 % gel gel  6/8/23   Historical Provider, MD   donepezil (Aricept) 10 mg tablet Take 1 tablet (10 mg) by mouth once daily in the morning. Take before meals. 9/5/23   Historical Provider, MD   FeroSuL 325 mg (65 mg iron) tablet Take 1 tablet (325 mg) by mouth once daily. 6/20/23   Historical Provider, MD   fluticasone (Flonase) 50 mcg/actuation nasal spray SHAKE LIQUID AND USE 1 SPRAY IN EACH NOSTRIL DAILY AT BEDTIME 2/22/23   Historical Provider, MD   glucose 4 gram chewable tablet  9/17/23   Historical Provider, MD   insulin lispro (HumaLOG) 100 unit/mL injection  9/17/23   Historical Provider, MD   latanoprost (Xalatan) 0.005 % ophthalmic solution INSTILL 1 DROP INTO BOTH EYES EVERY DAY 8/28/23   Historical Provider, MD   levothyroxine (Synthroid, Levoxyl) 75 mcg tablet Take 1 tablet (75 mcg) by mouth once daily. 5/24/23   Historical Provider, MD   lidocaine (Lidoderm) 5 % patch APPLY 1 PATCH TOPICALLY TO THE SKIN EVERY 24 HOURS FOR 7 DAYS 9/8/23   Historical Provider, MD   lisinopril 20 mg tablet Take 1 tablet (20 mg) by mouth once daily. 9/5/23   Historical Provider, MD   melatonin 5 mg tablet Take 1 tablet (5 mg) by mouth once daily at bedtime. 9/16/23   Historical Provider, MD   metFORMIN (Glucophage) 850 mg tablet Take 1 tablet (850 mg) by mouth 2 times a day. 9/5/23   Historical Provider, MD   methocarbamol (Robaxin) 500 mg tablet Take 1 tablet (500 mg) by mouth every 6 hours if needed. 2/20/23   Historical Provider, MD   Milk of Magnesia 400 mg/5 mL suspension SHAKE LIQUID AND TAKE 30 ML BY MOUTH TWICE DAILY FOR 7 DAYS 9/22/23   Historical Provider, MD   Multivitamin Women 50 Plus 8 mg iron-400 mcg-50 mcg tablet Take 1 tablet by mouth once daily. 9/5/23   Historical Provider, MD   omeprazole (PriLOSEC) 20 mg DR capsule Take 1 capsule (20 mg) by mouth once daily. 8/21/23   Historical Provider, MD   ondansetron (Zofran) 4 mg tablet   9/17/23   Historical Provider, MD   OneTouch Delica Plus Lancet 33 gauge misc  9/17/23   Historical Provider, MD   OneTouch Ultra Control solution  9/17/23   Historical Provider, MD   OneTouch Ultra Test strip  9/17/23   Historical Provider, MD   OneTouch Ultra2 Meter misc USE AS DIRECTED TO MONITOR BLOOD SUGAR THREE TIMES DAILY AS DIRECTED 6/7/23   Historical Provider, MD   OneTouch UltraSoft Lancets misc USE AS DIRECTED TO MONITOR BLOOD SUGAR THREE TIMES DAILY AS DIRECTED 8/11/23   Historical Provider, MD   PARoxetine (Paxil) 40 mg tablet Take 1 tablet (40 mg) by mouth once daily in the morning. Take before meals. 9/5/23   Historical Provider, MD   polyethylene glycol (Glycolax, Miralax) 17 gram/dose powder  9/17/23   Historical Provider, MD   senna 8.6 mg tablet  9/17/23   Historical Provider, MD   traMADol (Ultram) 50 mg tablet Take 1 tablet (50 mg) by mouth. 9/28/23   Historical Provider, MD        PAT ROS:   Constitutional:   neg    Neuro/Psych:   neg    Eyes:    use of corrective lenses  Ears:   neg    Nose:   neg    Mouth:    Complete dentures  Throat:   neg    Neck:   neg    Cardio:   neg    Respiratory:   neg    Endocrine:   neg    GI:    constipation  :   neg    Musculoskeletal:    arthralgias  Hematologic:   neg    Skin:  neg        Physical Exam  Vitals reviewed. Physical exam within normal limits.          PAT AIRWAY:   Airway:     Mallampati::  III    TM distance::  >3 FB    Neck ROM::  Full      Visit Vitals  /80   Pulse 95   Temp 36.3 °C (97.3 °F)       DASI Risk Score      Flowsheet Row Most Recent Value   DASI SCORE 7.2   METS Score (Will be calculated only when all the questions are answered) 3.6          Caprini DVT Assessment      Flowsheet Row Most Recent Value   DVT Score 8   Current Status Major surgery planned, lasting over 3 hours   Age 60-75 years   BMI 30 or less          Modified Frailty Index      Flowsheet Row Most Recent Value   Modified Frailty Index Calculator .1818           CHADS2 Stroke Risk  Current as of 18 minutes ago        N/A 3 - 100%: High Risk   2 - 3%: Medium Risk   0 - 2%: Low Risk     Last Change: N/A          This score determines the patient's risk of having a stroke if the patient has atrial fibrillation.        This score is not applicable to this patient. Components are not calculated.          Revised Cardiac Risk Index      Flowsheet Row Most Recent Value   Revised Cardiac Risk Calculator 1          Apfel Simplified Score      Flowsheet Row Most Recent Value   Apfel Simplified Score Calculator 3          Risk Analysis Index Results This Encounter    No data found in the last 1 encounters.       Stop Bang Score      Flowsheet Row Most Recent Value   Do you snore loudly? 0   Do you often feel tired or fatigued after your sleep? 1   Has anyone ever observed you stop breathing in your sleep? 1   Do you have or are you being treated for high blood pressure? 1   Recent BMI (Calculated) 26.5   Is BMI greater than 35 kg/m2? 0=No   Age older than 50 years old? 1=Yes   Gender - Male 0=No            Assessment and Plan:     Neuro:   The patient is at increased risk for perioperative stroke secondary to hypertension , increased age, hyperlipidemia, female gender, diabetes mellitus, general anesthesia, operative time >2.5 hours.    HEENT/Airway  No diagnoses, significant findings on chart review, clinical presentation, or evaluation.    Cardiovascular  The patient is scheduled for non-cardiac surgery associated with elevated risk.  The patient has no major cardiac contraindications to non- cardiac surgery.  RCRI  The patient meets 2 RCRI criteria and therefore has a 6.6% risk (elevated) of major adverse cardiac complications.  EKG  The patient has no EKG or echocardiographic changes concerning for myocardial ischemia.  No further cardiac evaluation is indicated  Heart Failure  The patient has no known history of heart failure.  Additionally, the patient reports no symptoms of  heart failure and demonstrates no signs of heart failure.  Hypertension Evaluation  The patient has a known history of hypertension that is controlled.  Patient's hypertension is most consistent with stage 2.  Heart Rhythm Evaluation  The patient has no history of arrhythmias.  Heart Valve Evaluation  The patient has no known history of valvular heart disease. The patient has no symptoms or physical exam findings to suggest valvular heart disease.  CARDS EVAL  The patient is not followed by cardiology.  -The patient has a 30-day risk for MACE of 2 predictors, 10.1% risk for cardiac death, nonfatal myocardial infarction, and nonfatal cardiac arrest  Pulmonary   No significant findings on chart review or clinical presentation and evaluation.  The patient has a stop bang score of 4, which places patient at intermediate risk for having ZEN.  ARISCAT 41, Intermediate, 13.3% risk of in-hospital postoperative pulmonary complications  PRODIGY 12, intermediate of respiratory depression episode.    Hematology  The patient has diagnoses or significant findings on chart review or clinical presentation and evaluation significant for Pancreatic Cancer.  Antiplatelet management   The patient is not currently receiving antiplatelet therapy.  Anticoagulation management  The patient is not currently receiving anticoagulation therapy.  Caprini score 8, low risk of VTE  Transfusion Evaluation  A type and screen was obtained given the likelihood for perioperative transfusion of blood or blood products.    GI  No diagnoses or significant findings on chart review or clinical presentation and evaluation.  Eat 10- 0    Genitourinary  No diagnoses or significant findings on chart review or clinical presentation and evaluation.    Renal  The patient has no known history of chronic kidney disease.    Musculoskeletal  The patient has diagnoses or significant findings on chart review or clinical presentation and evaluation significant for  Osteoarthritis    Endocrine  Diabetes Evaluation  The patient has history of diabetes mellitus controlled by Medication  Thyroid Disease Evaluation  The patient has a history of thyroid disease that appears controlled.    ID  No diagnoses or significant findings on chart review or clinical presentation and evaluation.    -Preoperative medication instructions were provided and reviewed with the patient.  Any additional testing or evaluation was explained to the patient.  NPO Instructions were discussed, and the patient's questions were answered prior to conclusion of this encounter

## 2023-11-03 NOTE — PREPROCEDURE INSTRUCTIONS
NPO Instructions:    Do not eat any food after midnight the night before your surgery/procedure.  You may have clear liquids until TWO hours before surgery/procedure. This includes water, black tea/coffee, (no milk or cream) apple juice and electrolyte drinks (Gatorade).  You may chew gum up to TWO hours before your surgery/procedure.    Additional Instructions:     Three Days before Surgery:  Review your medication instructions, stop indicated medications  The Day before Surgery:  Review your medication instructions, stop indicated medications  You will be contacted regarding the time of your arrival to facility and surgery time  Do not eat any food after Midnight  Day of Surgery:  Review your medication instructions, take indicated medications  If you have diabetes, please check your fasting blood sugar upon awakening.  If fasting blood sugar is <80 mg/dl, drink 100 ml of apple juice, time limit of 2 hours before  You may have clear liquids until TWO hours before surgery/procedure.  This includes water, black tea/coffee, (no milk or cream) apple juice and electrolyte drinks (Gatorade)  You may chew gum up to TWO hours before your surgery/procedure  Wear  comfortable loose fitting clothing  Do not use moisturizers, creams, lotions or perfume

## 2023-11-05 ENCOUNTER — ANESTHESIA EVENT (OUTPATIENT)
Dept: OPERATING ROOM | Facility: HOSPITAL | Age: 68
End: 2023-11-05
Payer: MEDICAID

## 2023-11-06 ENCOUNTER — APPOINTMENT (OUTPATIENT)
Dept: RADIOLOGY | Facility: HOSPITAL | Age: 68
End: 2023-11-06
Payer: MEDICAID

## 2023-11-06 ENCOUNTER — ANESTHESIA (OUTPATIENT)
Dept: OPERATING ROOM | Facility: HOSPITAL | Age: 68
End: 2023-11-06
Payer: MEDICAID

## 2023-11-06 ENCOUNTER — HOSPITAL ENCOUNTER (INPATIENT)
Facility: HOSPITAL | Age: 68
LOS: 8 days | Discharge: NURSING, REHAB OR LONG-TERM CARE | End: 2023-11-14
Attending: SURGERY | Admitting: SURGERY
Payer: MEDICAID

## 2023-11-06 DIAGNOSIS — N39.0 URINARY TRACT INFECTION WITHOUT HEMATURIA, SITE UNSPECIFIED: ICD-10-CM

## 2023-11-06 DIAGNOSIS — K86.89 PANCREATIC MASS (HHS-HCC): Primary | ICD-10-CM

## 2023-11-06 LAB
ABO GROUP (TYPE) IN BLOOD: NORMAL
ALBUMIN SERPL BCP-MCNC: 3.7 G/DL (ref 3.4–5)
ALP SERPL-CCNC: 76 U/L (ref 33–136)
ALT SERPL W P-5'-P-CCNC: 94 U/L (ref 7–45)
ANION GAP BLDA CALCULATED.4IONS-SCNC: 10 MMO/L (ref 10–25)
ANION GAP BLDA CALCULATED.4IONS-SCNC: 11 MMO/L (ref 10–25)
ANION GAP BLDA CALCULATED.4IONS-SCNC: 9 MMO/L (ref 10–25)
ANION GAP SERPL CALC-SCNC: 15 MMOL/L (ref 10–20)
AST SERPL W P-5'-P-CCNC: 145 U/L (ref 9–39)
BASE EXCESS BLDA CALC-SCNC: -2.8 MMOL/L (ref -2–3)
BASE EXCESS BLDA CALC-SCNC: -3.4 MMOL/L (ref -2–3)
BASE EXCESS BLDA CALC-SCNC: -3.9 MMOL/L (ref -2–3)
BASE EXCESS BLDA CALC-SCNC: -4 MMOL/L (ref -2–3)
BASE EXCESS BLDA CALC-SCNC: -4.2 MMOL/L (ref -2–3)
BILIRUB SERPL-MCNC: 0.5 MG/DL (ref 0–1.2)
BODY TEMPERATURE: 37 DEGREES CELSIUS
BUN SERPL-MCNC: 17 MG/DL (ref 6–23)
CA-I BLDA-SCNC: 1.2 MMOL/L (ref 1.1–1.33)
CA-I BLDA-SCNC: 1.22 MMOL/L (ref 1.1–1.33)
CA-I BLDA-SCNC: 1.22 MMOL/L (ref 1.1–1.33)
CA-I BLDA-SCNC: 1.26 MMOL/L (ref 1.1–1.33)
CA-I BLDA-SCNC: 1.29 MMOL/L (ref 1.1–1.33)
CALCIUM SERPL-MCNC: 8.7 MG/DL (ref 8.6–10.6)
CHLORIDE BLDA-SCNC: 106 MMOL/L (ref 98–107)
CHLORIDE BLDA-SCNC: 107 MMOL/L (ref 98–107)
CHLORIDE BLDA-SCNC: 108 MMOL/L (ref 98–107)
CHLORIDE SERPL-SCNC: 107 MMOL/L (ref 98–107)
CO2 SERPL-SCNC: 21 MMOL/L (ref 21–32)
CREAT SERPL-MCNC: 0.5 MG/DL (ref 0.5–1.05)
ERYTHROCYTE [DISTWIDTH] IN BLOOD BY AUTOMATED COUNT: 14.6 % (ref 11.5–14.5)
GFR SERPL CREATININE-BSD FRML MDRD: >90 ML/MIN/1.73M*2
GLUCOSE BLD MANUAL STRIP-MCNC: 115 MG/DL (ref 74–99)
GLUCOSE BLD MANUAL STRIP-MCNC: 132 MG/DL (ref 74–99)
GLUCOSE BLD MANUAL STRIP-MCNC: 139 MG/DL (ref 74–99)
GLUCOSE BLD MANUAL STRIP-MCNC: 163 MG/DL (ref 74–99)
GLUCOSE BLD MANUAL STRIP-MCNC: 171 MG/DL (ref 74–99)
GLUCOSE BLD MANUAL STRIP-MCNC: 188 MG/DL (ref 74–99)
GLUCOSE BLD MANUAL STRIP-MCNC: 234 MG/DL (ref 74–99)
GLUCOSE BLDA-MCNC: 162 MG/DL (ref 74–99)
GLUCOSE BLDA-MCNC: 167 MG/DL (ref 74–99)
GLUCOSE BLDA-MCNC: 192 MG/DL (ref 74–99)
GLUCOSE BLDA-MCNC: 201 MG/DL (ref 74–99)
GLUCOSE BLDA-MCNC: 205 MG/DL (ref 74–99)
GLUCOSE SERPL-MCNC: 196 MG/DL (ref 74–99)
HCO3 BLDA-SCNC: 20.8 MMOL/L (ref 22–26)
HCO3 BLDA-SCNC: 21 MMOL/L (ref 22–26)
HCO3 BLDA-SCNC: 21.6 MMOL/L (ref 22–26)
HCO3 BLDA-SCNC: 22.1 MMOL/L (ref 22–26)
HCO3 BLDA-SCNC: 22.6 MMOL/L (ref 22–26)
HCT VFR BLD AUTO: 28.2 % (ref 36–46)
HCT VFR BLD EST: 26 % (ref 36–46)
HCT VFR BLD EST: 27 % (ref 36–46)
HCT VFR BLD EST: 29 % (ref 36–46)
HCT VFR BLD EST: 29 % (ref 36–46)
HCT VFR BLD EST: 30 % (ref 36–46)
HGB BLD-MCNC: 9.4 G/DL (ref 12–16)
HGB BLDA-MCNC: 10.1 G/DL (ref 12–16)
HGB BLDA-MCNC: 8.8 G/DL (ref 12–16)
HGB BLDA-MCNC: 9.1 G/DL (ref 12–16)
HGB BLDA-MCNC: 9.5 G/DL (ref 12–16)
HGB BLDA-MCNC: 9.6 G/DL (ref 12–16)
INHALED O2 CONCENTRATION: 3 %
INHALED O2 CONCENTRATION: 3 %
INHALED O2 CONCENTRATION: 40 %
INHALED O2 CONCENTRATION: 50 %
INHALED O2 CONCENTRATION: 50 %
INR PPP: 1 (ref 0.9–1.1)
LACTATE BLDA-SCNC: 2.3 MMOL/L (ref 0.4–2)
LACTATE BLDA-SCNC: 2.5 MMOL/L (ref 0.4–2)
LACTATE BLDA-SCNC: 2.7 MMOL/L (ref 0.4–2)
LACTATE BLDA-SCNC: 2.7 MMOL/L (ref 0.4–2)
LACTATE BLDA-SCNC: 2.8 MMOL/L (ref 0.4–2)
MAGNESIUM SERPL-MCNC: 1.25 MG/DL (ref 1.6–2.4)
MCH RBC QN AUTO: 30.9 PG (ref 26–34)
MCHC RBC AUTO-ENTMCNC: 33.3 G/DL (ref 32–36)
MCV RBC AUTO: 93 FL (ref 80–100)
NRBC BLD-RTO: 0 /100 WBCS (ref 0–0)
OXYHGB MFR BLDA: 95.5 % (ref 94–98)
OXYHGB MFR BLDA: 96.9 % (ref 94–98)
OXYHGB MFR BLDA: 97 % (ref 94–98)
OXYHGB MFR BLDA: 97.7 % (ref 94–98)
OXYHGB MFR BLDA: 98.5 % (ref 94–98)
PCO2 BLDA: 36 MM HG (ref 38–42)
PCO2 BLDA: 38 MM HG (ref 38–42)
PCO2 BLDA: 40 MM HG (ref 38–42)
PCO2 BLDA: 41 MM HG (ref 38–42)
PCO2 BLDA: 41 MM HG (ref 38–42)
PH BLDA: 7.34 PH (ref 7.38–7.42)
PH BLDA: 7.34 PH (ref 7.38–7.42)
PH BLDA: 7.35 PH (ref 7.38–7.42)
PH BLDA: 7.35 PH (ref 7.38–7.42)
PH BLDA: 7.37 PH (ref 7.38–7.42)
PHOSPHATE SERPL-MCNC: 3 MG/DL (ref 2.5–4.9)
PLATELET # BLD AUTO: 242 X10*3/UL (ref 150–450)
PO2 BLDA: 112 MM HG (ref 85–95)
PO2 BLDA: 168 MM HG (ref 85–95)
PO2 BLDA: 189 MM HG (ref 85–95)
PO2 BLDA: 203 MM HG (ref 85–95)
PO2 BLDA: 94 MM HG (ref 85–95)
POTASSIUM BLDA-SCNC: 3.2 MMOL/L (ref 3.5–5.3)
POTASSIUM BLDA-SCNC: 3.6 MMOL/L (ref 3.5–5.3)
POTASSIUM BLDA-SCNC: 3.6 MMOL/L (ref 3.5–5.3)
POTASSIUM BLDA-SCNC: 3.7 MMOL/L (ref 3.5–5.3)
POTASSIUM BLDA-SCNC: 3.7 MMOL/L (ref 3.5–5.3)
POTASSIUM SERPL-SCNC: 3.7 MMOL/L (ref 3.5–5.3)
PROT SERPL-MCNC: 4.9 G/DL (ref 6.4–8.2)
PROTHROMBIN TIME: 11.5 SECONDS (ref 9.8–12.8)
RBC # BLD AUTO: 3.04 X10*6/UL (ref 4–5.2)
RH FACTOR (ANTIGEN D): NORMAL
SAO2 % BLDA: 100 % (ref 94–100)
SAO2 % BLDA: 100 % (ref 94–100)
SAO2 % BLDA: 98 % (ref 94–100)
SAO2 % BLDA: 98 % (ref 94–100)
SAO2 % BLDA: 99 % (ref 94–100)
SODIUM BLDA-SCNC: 133 MMOL/L (ref 136–145)
SODIUM BLDA-SCNC: 135 MMOL/L (ref 136–145)
SODIUM SERPL-SCNC: 139 MMOL/L (ref 136–145)
WBC # BLD AUTO: 9.6 X10*3/UL (ref 4.4–11.3)

## 2023-11-06 PROCEDURE — 88331 PATH CONSLTJ SURG 1 BLK 1SPC: CPT | Mod: TC,SUR | Performed by: SURGERY

## 2023-11-06 PROCEDURE — 84132 ASSAY OF SERUM POTASSIUM: CPT | Performed by: SURGERY

## 2023-11-06 PROCEDURE — 96372 THER/PROPH/DIAG INJ SC/IM: CPT | Performed by: SURGERY

## 2023-11-06 PROCEDURE — 0FB00ZX EXCISION OF LIVER, OPEN APPROACH, DIAGNOSTIC: ICD-10-PCS | Performed by: SURGERY

## 2023-11-06 PROCEDURE — 74018 RADEX ABDOMEN 1 VIEW: CPT

## 2023-11-06 PROCEDURE — 2500000004 HC RX 250 GENERAL PHARMACY W/ HCPCS (ALT 636 FOR OP/ED): Mod: SE | Performed by: SURGERY

## 2023-11-06 PROCEDURE — 88331 PATH CONSLTJ SURG 1 BLK 1SPC: CPT | Performed by: STUDENT IN AN ORGANIZED HEALTH CARE EDUCATION/TRAINING PROGRAM

## 2023-11-06 PROCEDURE — 2500000004 HC RX 250 GENERAL PHARMACY W/ HCPCS (ALT 636 FOR OP/ED): Performed by: SURGERY

## 2023-11-06 PROCEDURE — 88342 IMHCHEM/IMCYTCHM 1ST ANTB: CPT | Performed by: STUDENT IN AN ORGANIZED HEALTH CARE EDUCATION/TRAINING PROGRAM

## 2023-11-06 PROCEDURE — 88309 TISSUE EXAM BY PATHOLOGIST: CPT | Performed by: STUDENT IN AN ORGANIZED HEALTH CARE EDUCATION/TRAINING PROGRAM

## 2023-11-06 PROCEDURE — 2500000005 HC RX 250 GENERAL PHARMACY W/O HCPCS: Mod: SE | Performed by: STUDENT IN AN ORGANIZED HEALTH CARE EDUCATION/TRAINING PROGRAM

## 2023-11-06 PROCEDURE — 85018 HEMOGLOBIN: CPT | Performed by: SURGERY

## 2023-11-06 PROCEDURE — 85610 PROTHROMBIN TIME: CPT | Performed by: SURGERY

## 2023-11-06 PROCEDURE — 0FBG0ZZ EXCISION OF PANCREAS, OPEN APPROACH: ICD-10-PCS | Performed by: SURGERY

## 2023-11-06 PROCEDURE — 0FB90ZZ EXCISION OF COMMON BILE DUCT, OPEN APPROACH: ICD-10-PCS | Performed by: SURGERY

## 2023-11-06 PROCEDURE — 3700000001 HC GENERAL ANESTHESIA TIME - INITIAL BASE CHARGE: Performed by: SURGERY

## 2023-11-06 PROCEDURE — 88341 IMHCHEM/IMCYTCHM EA ADD ANTB: CPT | Performed by: STUDENT IN AN ORGANIZED HEALTH CARE EDUCATION/TRAINING PROGRAM

## 2023-11-06 PROCEDURE — A4217 STERILE WATER/SALINE, 500 ML: HCPCS | Mod: SE | Performed by: SURGERY

## 2023-11-06 PROCEDURE — 87181 SC STD AGAR DILUTION PER AGT: CPT | Performed by: SURGERY

## 2023-11-06 PROCEDURE — 83735 ASSAY OF MAGNESIUM: CPT | Performed by: SURGERY

## 2023-11-06 PROCEDURE — 74018 RADEX ABDOMEN 1 VIEW: CPT | Performed by: RADIOLOGY

## 2023-11-06 PROCEDURE — P9045 ALBUMIN (HUMAN), 5%, 250 ML: HCPCS | Mod: JZ,SE | Performed by: STUDENT IN AN ORGANIZED HEALTH CARE EDUCATION/TRAINING PROGRAM

## 2023-11-06 PROCEDURE — 7100000001 HC RECOVERY ROOM TIME - INITIAL BASE CHARGE: Performed by: SURGERY

## 2023-11-06 PROCEDURE — 82947 ASSAY GLUCOSE BLOOD QUANT: CPT | Performed by: STUDENT IN AN ORGANIZED HEALTH CARE EDUCATION/TRAINING PROGRAM

## 2023-11-06 PROCEDURE — 82947 ASSAY GLUCOSE BLOOD QUANT: CPT

## 2023-11-06 PROCEDURE — 7100000002 HC RECOVERY ROOM TIME - EACH INCREMENTAL 1 MINUTE: Performed by: SURGERY

## 2023-11-06 PROCEDURE — 84100 ASSAY OF PHOSPHORUS: CPT | Performed by: SURGERY

## 2023-11-06 PROCEDURE — 3600000010 HC OR TIME - EACH INCREMENTAL 1 MINUTE - PROCEDURE LEVEL FIVE: Performed by: SURGERY

## 2023-11-06 PROCEDURE — 47100 WEDGE BIOPSY OF LIVER: CPT | Performed by: SURGERY

## 2023-11-06 PROCEDURE — 3600000005 HC OR TIME - INITIAL BASE CHARGE - PROCEDURE LEVEL FIVE: Performed by: SURGERY

## 2023-11-06 PROCEDURE — 85027 COMPLETE CBC AUTOMATED: CPT | Performed by: SURGERY

## 2023-11-06 PROCEDURE — 37799 UNLISTED PX VASCULAR SURGERY: CPT | Performed by: SURGERY

## 2023-11-06 PROCEDURE — A48150 PR PART REMV PANC,PROX+REMV DUOD+ANAST: Performed by: STUDENT IN AN ORGANIZED HEALTH CARE EDUCATION/TRAINING PROGRAM

## 2023-11-06 PROCEDURE — 2720000007 HC OR 272 NO HCPCS: Performed by: SURGERY

## 2023-11-06 PROCEDURE — 2500000002 HC RX 250 W HCPCS SELF ADMINISTERED DRUGS (ALT 637 FOR MEDICARE OP, ALT 636 FOR OP/ED): Performed by: SURGERY

## 2023-11-06 PROCEDURE — 48150 PARTIAL REMOVAL OF PANCREAS: CPT | Performed by: SURGERY

## 2023-11-06 PROCEDURE — 2500000002 HC RX 250 W HCPCS SELF ADMINISTERED DRUGS (ALT 637 FOR MEDICARE OP, ALT 636 FOR OP/ED): Mod: SE | Performed by: STUDENT IN AN ORGANIZED HEALTH CARE EDUCATION/TRAINING PROGRAM

## 2023-11-06 PROCEDURE — S0109 METHADONE ORAL 5MG: HCPCS | Mod: SE | Performed by: STUDENT IN AN ORGANIZED HEALTH CARE EDUCATION/TRAINING PROGRAM

## 2023-11-06 PROCEDURE — 1200000002 HC GENERAL ROOM WITH TELEMETRY DAILY

## 2023-11-06 PROCEDURE — 3700000002 HC GENERAL ANESTHESIA TIME - EACH INCREMENTAL 1 MINUTE: Performed by: SURGERY

## 2023-11-06 PROCEDURE — 88332 PATH CONSLTJ SURG EA ADD BLK: CPT | Performed by: STUDENT IN AN ORGANIZED HEALTH CARE EDUCATION/TRAINING PROGRAM

## 2023-11-06 PROCEDURE — P9045 ALBUMIN (HUMAN), 5%, 250 ML: HCPCS | Mod: JZ

## 2023-11-06 PROCEDURE — 88305 TISSUE EXAM BY PATHOLOGIST: CPT | Performed by: STUDENT IN AN ORGANIZED HEALTH CARE EDUCATION/TRAINING PROGRAM

## 2023-11-06 PROCEDURE — 2500000001 HC RX 250 WO HCPCS SELF ADMINISTERED DRUGS (ALT 637 FOR MEDICARE OP): Performed by: SURGERY

## 2023-11-06 PROCEDURE — 85018 HEMOGLOBIN: CPT | Performed by: STUDENT IN AN ORGANIZED HEALTH CARE EDUCATION/TRAINING PROGRAM

## 2023-11-06 PROCEDURE — 2500000004 HC RX 250 GENERAL PHARMACY W/ HCPCS (ALT 636 FOR OP/ED)

## 2023-11-06 PROCEDURE — 36620 INSERTION CATHETER ARTERY: CPT | Performed by: STUDENT IN AN ORGANIZED HEALTH CARE EDUCATION/TRAINING PROGRAM

## 2023-11-06 PROCEDURE — 83605 ASSAY OF LACTIC ACID: CPT | Performed by: SURGERY

## 2023-11-06 PROCEDURE — 2500000004 HC RX 250 GENERAL PHARMACY W/ HCPCS (ALT 636 FOR OP/ED): Mod: SE | Performed by: STUDENT IN AN ORGANIZED HEALTH CARE EDUCATION/TRAINING PROGRAM

## 2023-11-06 PROCEDURE — 2780000003 HC OR 278 NO HCPCS: Performed by: SURGERY

## 2023-11-06 PROCEDURE — 2500000004 HC RX 250 GENERAL PHARMACY W/ HCPCS (ALT 636 FOR OP/ED): Mod: JZ

## 2023-11-06 PROCEDURE — 2500000005 HC RX 250 GENERAL PHARMACY W/O HCPCS: Mod: SE | Performed by: SURGERY

## 2023-11-06 PROCEDURE — 0DB60ZZ EXCISION OF STOMACH, OPEN APPROACH: ICD-10-PCS | Performed by: SURGERY

## 2023-11-06 PROCEDURE — 88307 TISSUE EXAM BY PATHOLOGIST: CPT | Performed by: STUDENT IN AN ORGANIZED HEALTH CARE EDUCATION/TRAINING PROGRAM

## 2023-11-06 RX ORDER — PANTOPRAZOLE SODIUM 40 MG/1
40 TABLET, DELAYED RELEASE ORAL
Status: DISCONTINUED | OUTPATIENT
Start: 2023-11-07 | End: 2023-11-15 | Stop reason: HOSPADM

## 2023-11-06 RX ORDER — ALBUMIN HUMAN 50 G/1000ML
SOLUTION INTRAVENOUS AS NEEDED
Status: DISCONTINUED | OUTPATIENT
Start: 2023-11-06 | End: 2023-11-06

## 2023-11-06 RX ORDER — SODIUM CHLORIDE, SODIUM LACTATE, POTASSIUM CHLORIDE, CALCIUM CHLORIDE 600; 310; 30; 20 MG/100ML; MG/100ML; MG/100ML; MG/100ML
75 INJECTION, SOLUTION INTRAVENOUS CONTINUOUS
Status: DISCONTINUED | OUTPATIENT
Start: 2023-11-06 | End: 2023-11-07

## 2023-11-06 RX ORDER — ATORVASTATIN CALCIUM 80 MG/1
80 TABLET, FILM COATED ORAL DAILY
Status: DISCONTINUED | OUTPATIENT
Start: 2023-11-06 | End: 2023-11-08

## 2023-11-06 RX ORDER — FENTANYL CITRATE 50 UG/ML
INJECTION, SOLUTION INTRAMUSCULAR; INTRAVENOUS AS NEEDED
Status: DISCONTINUED | OUTPATIENT
Start: 2023-11-06 | End: 2023-11-06

## 2023-11-06 RX ORDER — ACETAMINOPHEN 325 MG/1
TABLET ORAL AS NEEDED
Status: DISCONTINUED | OUTPATIENT
Start: 2023-11-06 | End: 2023-11-06

## 2023-11-06 RX ORDER — ONDANSETRON 4 MG/1
4 TABLET, ORALLY DISINTEGRATING ORAL EVERY 8 HOURS PRN
Status: DISCONTINUED | OUTPATIENT
Start: 2023-11-06 | End: 2023-11-15 | Stop reason: HOSPADM

## 2023-11-06 RX ORDER — DONEPEZIL HYDROCHLORIDE 10 MG/1
10 TABLET, FILM COATED ORAL
Status: DISCONTINUED | OUTPATIENT
Start: 2023-11-07 | End: 2023-11-15 | Stop reason: HOSPADM

## 2023-11-06 RX ORDER — DEXMEDETOMIDINE HYDROCHLORIDE 4 UG/ML
INJECTION, SOLUTION INTRAVENOUS CONTINUOUS PRN
Status: DISCONTINUED | OUTPATIENT
Start: 2023-11-06 | End: 2023-11-06

## 2023-11-06 RX ORDER — PHENYLEPHRINE HCL IN 0.9% NACL 0.4MG/10ML
SYRINGE (ML) INTRAVENOUS AS NEEDED
Status: DISCONTINUED | OUTPATIENT
Start: 2023-11-06 | End: 2023-11-06

## 2023-11-06 RX ORDER — HYDROMORPHONE HCL/0.9% NACL/PF 15 MG/30ML
PATIENT CONTROLLED ANALGESIA SYRINGE INTRAVENOUS CONTINUOUS
Status: DISCONTINUED | OUTPATIENT
Start: 2023-11-06 | End: 2023-11-10

## 2023-11-06 RX ORDER — ROCURONIUM BROMIDE 10 MG/ML
INJECTION, SOLUTION INTRAVENOUS AS NEEDED
Status: DISCONTINUED | OUTPATIENT
Start: 2023-11-06 | End: 2023-11-06

## 2023-11-06 RX ORDER — METHOCARBAMOL 100 MG/ML
1000 INJECTION, SOLUTION INTRAMUSCULAR; INTRAVENOUS EVERY 8 HOURS
Status: DISCONTINUED | OUTPATIENT
Start: 2023-11-06 | End: 2023-11-10

## 2023-11-06 RX ORDER — METHADONE HYDROCHLORIDE 10 MG/1
TABLET ORAL AS NEEDED
Status: DISCONTINUED | OUTPATIENT
Start: 2023-11-06 | End: 2023-11-06

## 2023-11-06 RX ORDER — ESMOLOL HYDROCHLORIDE 10 MG/ML
INJECTION INTRAVENOUS AS NEEDED
Status: DISCONTINUED | OUTPATIENT
Start: 2023-11-06 | End: 2023-11-06

## 2023-11-06 RX ORDER — OXYCODONE HYDROCHLORIDE 5 MG/1
5 TABLET ORAL EVERY 4 HOURS PRN
Status: DISCONTINUED | OUTPATIENT
Start: 2023-11-06 | End: 2023-11-06 | Stop reason: HOSPADM

## 2023-11-06 RX ORDER — PROPOFOL 10 MG/ML
INJECTION, EMULSION INTRAVENOUS AS NEEDED
Status: DISCONTINUED | OUTPATIENT
Start: 2023-11-06 | End: 2023-11-06

## 2023-11-06 RX ORDER — ONDANSETRON HYDROCHLORIDE 2 MG/ML
4 INJECTION, SOLUTION INTRAVENOUS EVERY 8 HOURS PRN
Status: DISCONTINUED | OUTPATIENT
Start: 2023-11-06 | End: 2023-11-15 | Stop reason: HOSPADM

## 2023-11-06 RX ORDER — SODIUM CHLORIDE 0.9 G/100ML
IRRIGANT IRRIGATION AS NEEDED
Status: DISCONTINUED | OUTPATIENT
Start: 2023-11-06 | End: 2023-11-06 | Stop reason: HOSPADM

## 2023-11-06 RX ORDER — DEXTROSE 50 % IN WATER (D50W) INTRAVENOUS SYRINGE
25
Status: DISCONTINUED | OUTPATIENT
Start: 2023-11-06 | End: 2023-11-15 | Stop reason: HOSPADM

## 2023-11-06 RX ORDER — PROMETHAZINE HYDROCHLORIDE 25 MG/1
25 SUPPOSITORY RECTAL EVERY 12 HOURS PRN
Status: DISCONTINUED | OUTPATIENT
Start: 2023-11-06 | End: 2023-11-15 | Stop reason: HOSPADM

## 2023-11-06 RX ORDER — LORAZEPAM 2 MG/ML
0.5 INJECTION INTRAMUSCULAR EVERY 12 HOURS PRN
Status: DISCONTINUED | OUTPATIENT
Start: 2023-11-06 | End: 2023-11-10

## 2023-11-06 RX ORDER — ACETAMINOPHEN 325 MG/1
650 TABLET ORAL EVERY 4 HOURS PRN
Status: DISCONTINUED | OUTPATIENT
Start: 2023-11-06 | End: 2023-11-06 | Stop reason: HOSPADM

## 2023-11-06 RX ORDER — MAGNESIUM SULFATE HEPTAHYDRATE 40 MG/ML
4 INJECTION, SOLUTION INTRAVENOUS ONCE
Status: COMPLETED | OUTPATIENT
Start: 2023-11-06 | End: 2023-11-06

## 2023-11-06 RX ORDER — LIDOCAINE HYDROCHLORIDE 20 MG/ML
INJECTION, SOLUTION INFILTRATION; PERINEURAL AS NEEDED
Status: DISCONTINUED | OUTPATIENT
Start: 2023-11-06 | End: 2023-11-06

## 2023-11-06 RX ORDER — HEPARIN SODIUM 5000 [USP'U]/ML
5000 INJECTION, SOLUTION INTRAVENOUS; SUBCUTANEOUS EVERY 8 HOURS
Status: DISCONTINUED | OUTPATIENT
Start: 2023-11-06 | End: 2023-11-07

## 2023-11-06 RX ORDER — HEPARIN SODIUM 5000 [USP'U]/ML
5000 INJECTION, SOLUTION INTRAVENOUS; SUBCUTANEOUS ONCE
Status: COMPLETED | OUTPATIENT
Start: 2023-11-06 | End: 2023-11-06

## 2023-11-06 RX ORDER — AMLODIPINE BESYLATE 10 MG/1
10 TABLET ORAL DAILY
Status: DISCONTINUED | OUTPATIENT
Start: 2023-11-06 | End: 2023-11-15 | Stop reason: HOSPADM

## 2023-11-06 RX ORDER — HYDROMORPHONE HYDROCHLORIDE 1 MG/ML
INJECTION, SOLUTION INTRAMUSCULAR; INTRAVENOUS; SUBCUTANEOUS CONTINUOUS PRN
Status: DISCONTINUED | OUTPATIENT
Start: 2023-11-06 | End: 2023-11-06

## 2023-11-06 RX ORDER — ACETAMINOPHEN 650 MG/1
650 SUPPOSITORY RECTAL EVERY 6 HOURS
Status: DISCONTINUED | OUTPATIENT
Start: 2023-11-06 | End: 2023-11-10

## 2023-11-06 RX ORDER — PAROXETINE HYDROCHLORIDE 40 MG/1
40 TABLET, FILM COATED ORAL
Status: DISCONTINUED | OUTPATIENT
Start: 2023-11-07 | End: 2023-11-15 | Stop reason: HOSPADM

## 2023-11-06 RX ORDER — POTASSIUM CHLORIDE 14.9 MG/ML
20 INJECTION INTRAVENOUS ONCE
Status: COMPLETED | OUTPATIENT
Start: 2023-11-06 | End: 2023-11-06

## 2023-11-06 RX ORDER — ACETAMINOPHEN 500 MG
5 TABLET ORAL NIGHTLY
Status: DISCONTINUED | OUTPATIENT
Start: 2023-11-06 | End: 2023-11-15 | Stop reason: HOSPADM

## 2023-11-06 RX ORDER — SODIUM CHLORIDE, SODIUM LACTATE, POTASSIUM CHLORIDE, CALCIUM CHLORIDE 600; 310; 30; 20 MG/100ML; MG/100ML; MG/100ML; MG/100ML
100 INJECTION, SOLUTION INTRAVENOUS CONTINUOUS
Status: DISCONTINUED | OUTPATIENT
Start: 2023-11-06 | End: 2023-11-06 | Stop reason: HOSPADM

## 2023-11-06 RX ORDER — ALBUMIN HUMAN 50 G/1000ML
12.5 SOLUTION INTRAVENOUS ONCE
Status: COMPLETED | OUTPATIENT
Start: 2023-11-06 | End: 2023-11-06

## 2023-11-06 RX ORDER — PROMETHAZINE HYDROCHLORIDE 25 MG/1
25 TABLET ORAL EVERY 6 HOURS PRN
Status: DISCONTINUED | OUTPATIENT
Start: 2023-11-06 | End: 2023-11-15 | Stop reason: HOSPADM

## 2023-11-06 RX ORDER — NALOXONE HYDROCHLORIDE 0.4 MG/ML
0.2 INJECTION, SOLUTION INTRAMUSCULAR; INTRAVENOUS; SUBCUTANEOUS AS NEEDED
Status: CANCELLED | OUTPATIENT
Start: 2023-11-06

## 2023-11-06 RX ORDER — LISINOPRIL 20 MG/1
20 TABLET ORAL DAILY
Status: DISCONTINUED | OUTPATIENT
Start: 2023-11-06 | End: 2023-11-15 | Stop reason: HOSPADM

## 2023-11-06 RX ORDER — PANTOPRAZOLE SODIUM 40 MG/10ML
40 INJECTION, POWDER, LYOPHILIZED, FOR SOLUTION INTRAVENOUS
Status: DISCONTINUED | OUTPATIENT
Start: 2023-11-07 | End: 2023-11-10

## 2023-11-06 RX ORDER — PHENYLEPHRINE 10 MG/250 ML(40 MCG/ML)IN 0.9 % SOD.CHLORIDE INTRAVENOUS
CONTINUOUS PRN
Status: DISCONTINUED | OUTPATIENT
Start: 2023-11-06 | End: 2023-11-06

## 2023-11-06 RX ORDER — LATANOPROST 50 UG/ML
1 SOLUTION/ DROPS OPHTHALMIC NIGHTLY
Status: DISCONTINUED | OUTPATIENT
Start: 2023-11-06 | End: 2023-11-15 | Stop reason: HOSPADM

## 2023-11-06 RX ORDER — ACETAMINOPHEN 160 MG/5ML
650 SOLUTION ORAL EVERY 6 HOURS
Status: DISCONTINUED | OUTPATIENT
Start: 2023-11-06 | End: 2023-11-10

## 2023-11-06 RX ORDER — POLYMYXIN B 500000 [USP'U]/1
INJECTION, POWDER, LYOPHILIZED, FOR SOLUTION INTRAMUSCULAR; INTRATHECAL; INTRAVENOUS; OPHTHALMIC AS NEEDED
Status: DISCONTINUED | OUTPATIENT
Start: 2023-11-06 | End: 2023-11-06 | Stop reason: HOSPADM

## 2023-11-06 RX ORDER — KETOROLAC TROMETHAMINE 30 MG/ML
15 INJECTION, SOLUTION INTRAMUSCULAR; INTRAVENOUS EVERY 6 HOURS
Status: CANCELLED | OUTPATIENT
Start: 2023-11-06 | End: 2023-11-11

## 2023-11-06 RX ORDER — ACETAMINOPHEN 325 MG/1
650 TABLET ORAL EVERY 6 HOURS
Status: DISCONTINUED | OUTPATIENT
Start: 2023-11-06 | End: 2023-11-15 | Stop reason: HOSPADM

## 2023-11-06 RX ORDER — LEVOTHYROXINE SODIUM 75 UG/1
75 TABLET ORAL
Status: DISCONTINUED | OUTPATIENT
Start: 2023-11-07 | End: 2023-11-08

## 2023-11-06 RX ORDER — INSULIN LISPRO 100 [IU]/ML
0-5 INJECTION, SOLUTION INTRAVENOUS; SUBCUTANEOUS
Status: DISCONTINUED | OUTPATIENT
Start: 2023-11-06 | End: 2023-11-10

## 2023-11-06 RX ORDER — ONDANSETRON HYDROCHLORIDE 2 MG/ML
4 INJECTION, SOLUTION INTRAVENOUS ONCE AS NEEDED
Status: DISCONTINUED | OUTPATIENT
Start: 2023-11-06 | End: 2023-11-06 | Stop reason: HOSPADM

## 2023-11-06 RX ORDER — DEXTROSE MONOHYDRATE 100 MG/ML
0.3 INJECTION, SOLUTION INTRAVENOUS ONCE AS NEEDED
Status: DISCONTINUED | OUTPATIENT
Start: 2023-11-06 | End: 2023-11-15 | Stop reason: HOSPADM

## 2023-11-06 RX ADMIN — LATANOPROST 1 DROP: 50 SOLUTION OPHTHALMIC at 21:45

## 2023-11-06 RX ADMIN — FENTANYL CITRATE 50 MCG: 50 INJECTION, SOLUTION INTRAMUSCULAR; INTRAVENOUS at 07:41

## 2023-11-06 RX ADMIN — FENTANYL CITRATE 25 MCG: 50 INJECTION, SOLUTION INTRAMUSCULAR; INTRAVENOUS at 07:31

## 2023-11-06 RX ADMIN — ALBUMIN HUMAN 250 ML: 0.05 INJECTION, SOLUTION INTRAVENOUS at 10:19

## 2023-11-06 RX ADMIN — TAZOBACTAM SODIUM AND PIPERACILLIN SODIUM 3.38 G: 375; 3 INJECTION, SOLUTION INTRAVENOUS at 11:43

## 2023-11-06 RX ADMIN — Medication 80 MCG: at 11:33

## 2023-11-06 RX ADMIN — ROCURONIUM BROMIDE 20 MG: 50 INJECTION, SOLUTION INTRAVENOUS at 12:33

## 2023-11-06 RX ADMIN — SUGAMMADEX 200 MG: 100 INJECTION, SOLUTION INTRAVENOUS at 14:36

## 2023-11-06 RX ADMIN — ROCURONIUM BROMIDE 60 MG: 50 INJECTION, SOLUTION INTRAVENOUS at 07:33

## 2023-11-06 RX ADMIN — FENTANYL CITRATE 25 MCG: 50 INJECTION, SOLUTION INTRAMUSCULAR; INTRAVENOUS at 08:56

## 2023-11-06 RX ADMIN — ALBUMIN HUMAN 250 ML: 0.05 INJECTION, SOLUTION INTRAVENOUS at 10:49

## 2023-11-06 RX ADMIN — Medication 80 MCG: at 10:44

## 2023-11-06 RX ADMIN — ESMOLOL HYDROCHLORIDE 50 MG: 10 INJECTION, SOLUTION INTRAVENOUS at 07:40

## 2023-11-06 RX ADMIN — ROCURONIUM BROMIDE 10 MG: 50 INJECTION, SOLUTION INTRAVENOUS at 09:06

## 2023-11-06 RX ADMIN — DEXMEDETOMIDINE HYDROCHLORIDE 0.2 MCG/KG/HR: 4 INJECTION, SOLUTION INTRAVENOUS at 08:20

## 2023-11-06 RX ADMIN — Medication 80 MCG: at 12:31

## 2023-11-06 RX ADMIN — Medication 0.1 MCG/KG/MIN: at 11:33

## 2023-11-06 RX ADMIN — PROPOFOL 50 MG: 10 INJECTION, EMULSION INTRAVENOUS at 07:39

## 2023-11-06 RX ADMIN — INSULIN HUMAN 5 UNITS: 100 INJECTION, SOLUTION PARENTERAL at 09:03

## 2023-11-06 RX ADMIN — ROCURONIUM BROMIDE 20 MG: 50 INJECTION, SOLUTION INTRAVENOUS at 11:21

## 2023-11-06 RX ADMIN — METHOCARBAMOL 1000 MG: 100 INJECTION INTRAMUSCULAR; INTRAVENOUS at 23:33

## 2023-11-06 RX ADMIN — METHADONE HYDROCHLORIDE 15 MG: 10 TABLET ORAL at 07:17

## 2023-11-06 RX ADMIN — SODIUM CHLORIDE, SODIUM LACTATE, POTASSIUM CHLORIDE, AND CALCIUM CHLORIDE: 600; 310; 30; 20 INJECTION, SOLUTION INTRAVENOUS at 07:26

## 2023-11-06 RX ADMIN — HEPARIN SODIUM 5000 UNITS: 5000 INJECTION, SOLUTION INTRAVENOUS; SUBCUTANEOUS at 07:01

## 2023-11-06 RX ADMIN — ESMOLOL HYDROCHLORIDE 40 MG: 10 INJECTION, SOLUTION INTRAVENOUS at 08:22

## 2023-11-06 RX ADMIN — Medication: at 15:45

## 2023-11-06 RX ADMIN — Medication 120 MCG: at 09:33

## 2023-11-06 RX ADMIN — ROCURONIUM BROMIDE 20 MG: 50 INJECTION, SOLUTION INTRAVENOUS at 10:56

## 2023-11-06 RX ADMIN — MAGNESIUM SULFATE HEPTAHYDRATE 4 G: 40 INJECTION, SOLUTION INTRAVENOUS at 18:07

## 2023-11-06 RX ADMIN — SODIUM CHLORIDE, SODIUM LACTATE, POTASSIUM CHLORIDE, AND CALCIUM CHLORIDE: 600; 310; 30; 20 INJECTION, SOLUTION INTRAVENOUS at 10:17

## 2023-11-06 RX ADMIN — ROCURONIUM BROMIDE 20 MG: 50 INJECTION, SOLUTION INTRAVENOUS at 09:29

## 2023-11-06 RX ADMIN — ROCURONIUM BROMIDE 10 MG: 50 INJECTION, SOLUTION INTRAVENOUS at 10:22

## 2023-11-06 RX ADMIN — Medication 120 MCG: at 10:51

## 2023-11-06 RX ADMIN — ROCURONIUM BROMIDE 10 MG: 50 INJECTION, SOLUTION INTRAVENOUS at 13:36

## 2023-11-06 RX ADMIN — HEPARIN SODIUM 5000 UNITS: 5000 INJECTION, SOLUTION INTRAVENOUS; SUBCUTANEOUS at 15:35

## 2023-11-06 RX ADMIN — Medication 80 MCG: at 10:29

## 2023-11-06 RX ADMIN — HEPARIN SODIUM 5000 UNITS: 5000 INJECTION, SOLUTION INTRAVENOUS; SUBCUTANEOUS at 23:33

## 2023-11-06 RX ADMIN — PIPERACILLIN SODIUM AND TAZOBACTAM SODIUM 3.38 G: 3; .375 INJECTION, SOLUTION INTRAVENOUS at 17:43

## 2023-11-06 RX ADMIN — ROCURONIUM BROMIDE 20 MG: 50 INJECTION, SOLUTION INTRAVENOUS at 08:34

## 2023-11-06 RX ADMIN — ACETAMINOPHEN 975 MG: 325 TABLET ORAL at 07:17

## 2023-11-06 RX ADMIN — INSULIN LISPRO 1 UNITS: 100 INJECTION, SOLUTION INTRAVENOUS; SUBCUTANEOUS at 17:47

## 2023-11-06 RX ADMIN — TAZOBACTAM SODIUM AND PIPERACILLIN SODIUM 3.38 G: 375; 3 INJECTION, SOLUTION INTRAVENOUS at 07:55

## 2023-11-06 RX ADMIN — ALBUMIN HUMAN 12.5 G: 0.05 INJECTION, SOLUTION INTRAVENOUS at 21:45

## 2023-11-06 RX ADMIN — POTASSIUM CHLORIDE 20 MEQ: 14.9 INJECTION, SOLUTION INTRAVENOUS at 17:34

## 2023-11-06 RX ADMIN — METHOCARBAMOL 1000 MG: 100 INJECTION INTRAMUSCULAR; INTRAVENOUS at 15:28

## 2023-11-06 RX ADMIN — ROCURONIUM BROMIDE 10 MG: 50 INJECTION, SOLUTION INTRAVENOUS at 11:58

## 2023-11-06 RX ADMIN — PROPOFOL 100 MG: 10 INJECTION, EMULSION INTRAVENOUS at 07:32

## 2023-11-06 RX ADMIN — LIDOCAINE HYDROCHLORIDE 70 MG: 20 INJECTION, SOLUTION INFILTRATION; PERINEURAL at 07:32

## 2023-11-06 SDOH — SOCIAL STABILITY: SOCIAL INSECURITY: HAS ANYONE EVER THREATENED TO HURT YOUR FAMILY OR YOUR PETS?: NO

## 2023-11-06 SDOH — SOCIAL STABILITY: SOCIAL INSECURITY: WERE YOU ABLE TO COMPLETE ALL THE BEHAVIORAL HEALTH SCREENINGS?: YES

## 2023-11-06 SDOH — SOCIAL STABILITY: SOCIAL INSECURITY: ARE THERE ANY APPARENT SIGNS OF INJURIES/BEHAVIORS THAT COULD BE RELATED TO ABUSE/NEGLECT?: NO

## 2023-11-06 SDOH — SOCIAL STABILITY: SOCIAL INSECURITY: ARE YOU OR HAVE YOU BEEN THREATENED OR ABUSED PHYSICALLY, EMOTIONALLY, OR SEXUALLY BY ANYONE?: NO

## 2023-11-06 SDOH — SOCIAL STABILITY: SOCIAL INSECURITY: DO YOU FEEL UNSAFE GOING BACK TO THE PLACE WHERE YOU ARE LIVING?: NO

## 2023-11-06 SDOH — SOCIAL STABILITY: SOCIAL INSECURITY: ABUSE: ADULT

## 2023-11-06 SDOH — HEALTH STABILITY: MENTAL HEALTH: CURRENT SMOKER: 0

## 2023-11-06 SDOH — SOCIAL STABILITY: SOCIAL INSECURITY: DO YOU FEEL ANYONE HAS EXPLOITED OR TAKEN ADVANTAGE OF YOU FINANCIALLY OR OF YOUR PERSONAL PROPERTY?: NO

## 2023-11-06 SDOH — SOCIAL STABILITY: SOCIAL INSECURITY: HAVE YOU HAD THOUGHTS OF HARMING ANYONE ELSE?: NO

## 2023-11-06 SDOH — SOCIAL STABILITY: SOCIAL INSECURITY: DOES ANYONE TRY TO KEEP YOU FROM HAVING/CONTACTING OTHER FRIENDS OR DOING THINGS OUTSIDE YOUR HOME?: NO

## 2023-11-06 ASSESSMENT — PAIN SCALES - GENERAL
PAINLEVEL_OUTOF10: 4
PAINLEVEL_OUTOF10: 4
PAINLEVEL_OUTOF10: 0 - NO PAIN
PAINLEVEL_OUTOF10: 0 - NO PAIN
PAIN_LEVEL: 0
PAINLEVEL_OUTOF10: 0 - NO PAIN

## 2023-11-06 ASSESSMENT — ACTIVITIES OF DAILY LIVING (ADL)
LACK_OF_TRANSPORTATION: NO
FEEDING YOURSELF: INDEPENDENT
HEARING - RIGHT EAR: FUNCTIONAL
GROOMING: INDEPENDENT
HEARING - LEFT EAR: FUNCTIONAL
TOILETING: INDEPENDENT
WALKS IN HOME: INDEPENDENT
JUDGMENT_ADEQUATE_SAFELY_COMPLETE_DAILY_ACTIVITIES: YES
PATIENT'S MEMORY ADEQUATE TO SAFELY COMPLETE DAILY ACTIVITIES?: YES
BATHING: INDEPENDENT
ADEQUATE_TO_COMPLETE_ADL: YES
DRESSING YOURSELF: INDEPENDENT

## 2023-11-06 ASSESSMENT — PAIN - FUNCTIONAL ASSESSMENT
PAIN_FUNCTIONAL_ASSESSMENT: 0-10

## 2023-11-06 ASSESSMENT — COGNITIVE AND FUNCTIONAL STATUS - GENERAL
TOILETING: A LITTLE
MOBILITY SCORE: 18
STANDING UP FROM CHAIR USING ARMS: A LITTLE
MOBILITY SCORE: 21
HELP NEEDED FOR BATHING: A LITTLE
CLIMB 3 TO 5 STEPS WITH RAILING: A LITTLE
CLIMB 3 TO 5 STEPS WITH RAILING: A LITTLE
WALKING IN HOSPITAL ROOM: A LITTLE
WALKING IN HOSPITAL ROOM: A LITTLE
DRESSING REGULAR UPPER BODY CLOTHING: A LITTLE
PATIENT BASELINE BEDBOUND: NO
DAILY ACTIVITIY SCORE: 20
DRESSING REGULAR LOWER BODY CLOTHING: A LITTLE
PERSONAL GROOMING: A LITTLE
DAILY ACTIVITIY SCORE: 18
TOILETING: A LITTLE
HELP NEEDED FOR BATHING: A LITTLE
MOVING TO AND FROM BED TO CHAIR: A LITTLE
DRESSING REGULAR UPPER BODY CLOTHING: A LITTLE
EATING MEALS: A LITTLE
TURNING FROM BACK TO SIDE WHILE IN FLAT BAD: A LITTLE
STANDING UP FROM CHAIR USING ARMS: A LITTLE
MOVING FROM LYING ON BACK TO SITTING ON SIDE OF FLAT BED WITH BEDRAILS: A LITTLE
DRESSING REGULAR LOWER BODY CLOTHING: A LITTLE

## 2023-11-06 ASSESSMENT — LIFESTYLE VARIABLES
HOW OFTEN DO YOU HAVE A DRINK CONTAINING ALCOHOL: 4 OR MORE TIMES A WEEK
SKIP TO QUESTIONS 9-10: 0
HOW OFTEN DO YOU HAVE 6 OR MORE DRINKS ON ONE OCCASION: LESS THAN MONTHLY
AUDIT-C TOTAL SCORE: 5
AUDIT-C TOTAL SCORE: 5
HOW MANY STANDARD DRINKS CONTAINING ALCOHOL DO YOU HAVE ON A TYPICAL DAY: 1 OR 2
PRESCIPTION_ABUSE_PAST_12_MONTHS: NO
SUBSTANCE_ABUSE_PAST_12_MONTHS: NO

## 2023-11-06 ASSESSMENT — COLUMBIA-SUICIDE SEVERITY RATING SCALE - C-SSRS
6. HAVE YOU EVER DONE ANYTHING, STARTED TO DO ANYTHING, OR PREPARED TO DO ANYTHING TO END YOUR LIFE?: NO
2. HAVE YOU ACTUALLY HAD ANY THOUGHTS OF KILLING YOURSELF?: NO
1. IN THE PAST MONTH, HAVE YOU WISHED YOU WERE DEAD OR WISHED YOU COULD GO TO SLEEP AND NOT WAKE UP?: NO

## 2023-11-06 ASSESSMENT — PATIENT HEALTH QUESTIONNAIRE - PHQ9
1. LITTLE INTEREST OR PLEASURE IN DOING THINGS: NOT AT ALL
2. FEELING DOWN, DEPRESSED OR HOPELESS: NOT AT ALL
SUM OF ALL RESPONSES TO PHQ9 QUESTIONS 1 & 2: 0

## 2023-11-06 NOTE — ANESTHESIA POSTPROCEDURE EVALUATION
Patient: Laura No    Procedure Summary       Date: 11/06/23 Room / Location: [facility] OR 17 / [facility] OR    Anesthesia Start: 0726 Anesthesia Stop: 1454    Procedure: 1. Whipple Procedure 2. Excisional Wedge Liver Biopsy, x2 (Abdomen) Diagnosis:       Pancreatic mass      (Pancreatic mass [K86.89])    Surgeons: Alex Dia MD Responsible Provider: Lora Piña MD    Anesthesia Type: general ASA Status: 3            Anesthesia Type: general    Vitals Value Taken Time   /55 11/06/23 1455   Temp 36.4 11/06/23 1455   Pulse 88 11/06/23 1455   Resp 12 11/06/23 1455   SpO2 97% 11/06/23 1455       Anesthesia Post Evaluation    Patient location during evaluation: PACU  Patient participation: complete - patient participated  Level of consciousness: awake and alert  Pain score: 0  Pain management: adequate  Multimodal analgesia pain management approach  Airway patency: patent  Two or more strategies used to mitigate risk of obstructive sleep apnea  Cardiovascular status: acceptable and hemodynamically stable  Respiratory status: acceptable and face mask  Hydration status: acceptable        There were no known notable events for this encounter.

## 2023-11-06 NOTE — ANESTHESIA PROCEDURE NOTES
Peripheral IV  Date/Time: 11/6/2023 7:00 AM      Placement  Needle size: 18 G  Laterality: left  Location: hand  Site prep: chlorhexidine  Attempts: 1

## 2023-11-06 NOTE — ANESTHESIA PROCEDURE NOTES
Airway  Date/Time: 11/6/2023 7:29 AM  Urgency: elective      Staffing  Performed: resident   Authorized by: Lora Piña MD    Performed by: Wendy Vazquez MD  Patient location during procedure: OR    Indications and Patient Condition  Indications for airway management: anesthesia  Spontaneous Ventilation: absent  Sedation level: deep  Preoxygenated: yes  Patient position: sniffing  MILS maintained throughout  Mask difficulty assessment: 1 - vent by mask    Final Airway Details  Final airway type: endotracheal airway      Successful airway: ETT  Cuffed: yes   Successful intubation technique: direct laryngoscopy  Endotracheal tube insertion site: oral  Blade: Megan  Blade size: #3  ETT size (mm): 7.0  Cormack-Lehane Classification: grade I - full view of glottis  Placement verified by: chest auscultation   Inital cuff pressure (cm H2O): 8  Measured from: teeth  ETT to teeth (cm): 21  Number of attempts at approach: 1  Number of other approaches attempted: 0    Additional Comments  Intubated by GOMEZ Garcia

## 2023-11-06 NOTE — ANESTHESIA PREPROCEDURE EVALUATION
Patient: Laura No    Procedure Information       Date/Time: 11/06/23 0715    Procedure: Whipple Procedure    Location: [facility] OR 17 / [facility] OR    Surgeons: Alex Dia MD            Relevant Problems   Anesthesia (within normal limits)   (-) Malignant hyperthermia      Cardiovascular   (+) HTN (hypertension)      Endocrine   (+) Diabetes mellitus, type 2 (CMS/HCC)   (+) Hypothyroidism      GI   (+) Gastroesophageal reflux disease      /Renal (within normal limits)      Neuro/Psych   (+) Dementia (CMS/HCC)      Pulmonary (within normal limits)      GI/Hepatic  Pancreatic mass      Hematology (within normal limits)      Musculoskeletal   (+) Osteoarthritis       Clinical information reviewed:   Tobacco  Allergies  Meds   Med Hx  Surg Hx  OB Status  Fam Hx  Soc   Hx        NPO Detail:  NPO/Void Status  Carbonhydrate Drink Given Prior to Surgery? : N  Date of Last Liquid: 11/05/23  Time of Last Liquid: 0000  Date of Last Solid: 11/05/23  Time of Last Solid: 0000  Last Intake Type: Clear fluids  Time of Last Void: 0623         Physical Exam    Airway  Mallampati: I  TM distance: >3 FB  Neck ROM: full     Cardiovascular   Rhythm: regular  Rate: normal     Dental    Pulmonary   Breath sounds clear to auscultation     Abdominal            Anesthesia Plan    ASA 3     general     The patient is not a current smoker.  Patient did not smoke on day of procedure.  Education provided regarding risk of obstructive sleep apnea.  intravenous induction   Postoperative administration of opioids is intended.  Trial extubation is planned.  Anesthetic plan and risks discussed with patient and healthcare power of .  Use of blood products discussed with patient and healthcare power of  who consented to blood products.    Plan discussed with resident and attending.

## 2023-11-06 NOTE — ANESTHESIA PROCEDURE NOTES
Peripheral IV  Date/Time: 11/6/2023 7:50 AM      Placement  Needle size: 18 G  Laterality: right  Location: hand  Site prep: alcohol  Attempts: 1

## 2023-11-06 NOTE — ANESTHESIA PROCEDURE NOTES
Arterial Line:    Date/Time: 11/6/2023 8:03 AM    Staffing  Performed: resident   Authorized by: Lora Piña MD    Performed by: Wendy Vazquez MD    An arterial line was placed. Procedure performed using surface landmarks.in the OR for the following indication(s): continuous blood pressure monitoring.    A 20 gauge (size) (length), Angiocath (type) catheter was placed into the Right radial artery, secured by Tegaderm,   Seldinger technique not used.  Events:  patient tolerated procedure well with no complications.      Additional notes:  L radial artery attempted with pulsatile flow, seldinger technique, thread easily over wire, no waveform. Pressure held and dressing applied

## 2023-11-06 NOTE — OP NOTE
1. Whipple Procedure 2. Excisional Wedge Liver Biopsy, x2 Operative Note     Date: 2023  OR Location: [facility] OR    Name: Laura No, : 1955, Age: 68 y.o., MRN: [id number], Sex: female    Diagnosis  Pre-op Diagnosis     * Pancreatic mass [K86.89] Post-op Diagnosis     * Pancreatic mass [K86.89]     Procedures   Whipple  Excisional Wedge Liver Bx x 2    Surgeons      * Alex Dia - Primary    Resident/Fellow/Other Assistant:  Osei Busby, Assistant Surgeon, modifier 82...see below    Procedure Summary  Anesthesia: General  ASA: III  Anesthesia Staff: Anesthesiologist: Lora Piña MD  C-AA: TAMARA South; TAMARA Palumbo  Anesthesia Resident: Wendy Vazquez MD  Estimated Blood Loss: 300mL  Intra-op Medications:   Medication Name Total Dose   polymyxin B injection 2,000,000 Units   sodium chloride 0.9 % irrigation solution 3,000 mL   piperacillin-tazobactam-dextrose (Zosyn) IV 3.375 g 6.75 g              Anesthesia Record               Intraprocedure I/O Totals          Intake    Dexmedetomidine 0.00 mL    The total shown is the total volume documented since Anesthesia Start was filed.    LR 1000.00 mL    Propofol Drip 0.00 mL    The total shown is the total volume documented since Anesthesia Start was filed.    Phenylephrine Drip 0.00 mL    The total shown is the total volume documented since Anesthesia Start was filed.    Total Intake 1000 mL       Output    Urine 635 mL    Est. Blood Loss 300 mL    Total Output 935 mL       Net    Net Volume 65 mL          Specimen:   ID Type Source Tests Collected by Time   1 : Wedge liver biopsy #1 Tissue LIVER WEDGE RESECTION LEFT SURGICAL PATHOLOGY EXAM Alex Dia MD [id number]   2 : Wedge Liver Biopsy #2 Tissue LIVER WEDGE RESECTION RIGHT SURGICAL PATHOLOGY EXAM Alex Dia MD 202334   3 : HEPATIC ARTERY LYMPH NODE Tissue LYMPH NODE EXCISION SURGICAL PATHOLOGY EXAM Alex Dia MD  11/6/2023 0920   4 : PANCREATIC NECK MARGIN (FREEZE NON-CAUTERIZED SIDE) Tissue PANCREAS RESECTION SURGICAL PATHOLOGY EXAM Alex Dia MD 11/6/2023 1014   5 : WHIPPLE (FREEZE BILE DUCT MARKED BY STITCH) Tissue WHIPPLE SPECIMEN PANCREATICODUODENECTOMY SURGICAL PATHOLOGY EXAM Alex Dia MD 11/6/2023 1047   A : BILE - FOR BACTERIAL Fluid BILE FLUID STERILE FLUID CULTURE/SMEAR Alex Dia MD 11/6/2023 1021        Staff:   Circulator: Johnson Bryant, DI; Lisbeth Vallejo, DI; Sasha Bah, RN  Relief Scrub: Sandor Tam  Scrub Person: Bhupendra Gonzalez; Luci Guzmán RN         Drains and/or Catheters:   Closed/Suction Drain Medial;Right RUQ Other (Comment) 19 Fr. (Active)       Closed/Suction Drain Left LUQ Other (Comment) 19 Fr. (Active)       NG/OG/Feeding Tube NG - Campbell sump 16 Fr Right nostril (Active)       Urethral Catheter Non-latex 16 Fr. (Active)       Tourniquet Times:         Implants:     Findings: firm HOP    Indications: Laura No is an 68 y.o. female who is having surgery for Pancreatic mass [K86.89].     The patient was seen in the preoperative area. The risks, benefits, complications, treatment options, non-operative alternatives, expected recovery and outcomes were discussed with the patient. The possibilities of reaction to medication, pulmonary aspiration, injury to surrounding structures, bleeding, recurrent infection, the need for additional procedures, failure to diagnose a condition, and creating a complication requiring transfusion or operation were discussed with the patient. The patient concurred with the proposed plan, giving informed consent.  The site of surgery was properly noted/marked if necessary per policy. The patient has been actively warmed in preoperative area. Preoperative antibiotics have been ordered and given within 1 hours of incision. Venous thrombosis prophylaxis have been ordered including bilateral sequential compression devices and  chemical prophylaxis    Procedure Details:     This patient is 68 years old.  She presented with a clinical picture of pancreatic cancer we were not able to establish that diagnosis with standard endoscopic techniques.  As outlined my dictated clinic note she is brought to the operating room for resection.    The patient was brought to the operating room and placed on the operating table in supine position.  A timeout was performed.  After the induction of general endotracheal anesthesia, she was given intravenous antibiotics.  She had a Bernard catheter placed.  She had spot compression devices placed.  She had been given subcutaneous heparin.  Her abdomen was prepped and draped in standard sterile fashion.    We made a midline incision from xiphoid umbilicus stop we carried the dissection down through the subcutaneous tissues with electrocautery opening up the fascia the length of the incision.  There were a few adhesions of her omentum to the anterior abdominal wall from her previous open cholecystectomy.  We took those down and placed the Bookwalter for retraction.  There was no obvious evidence of ascites or carcinomatosis.  There were a couple of subcentimeter liver lesions, 1 on the right and 1 on the left.  Both of those were excised in an excisional wedge biopsy fashion.  Both returned as benign most likely bile duct proliferation or hamartoma.  Hemostasis was obtained with the argon beam and Surgicel.    We next began our dissection by taking down the adhesions to the gallbladder fossa from her previous open cholecystectomy.  That then allowed us to perform a Kocher maneuver elevating the pancreatic head and duodenum up out of the retroperitoneum.  The pancreatic head was firm.  We took this cortical maneuver down such that we identified the superior mesenteric vein and worked our way back toward the inferior border of the pancreas.    We next gained access to the lesser sac going to the gastrocolic omentum.   We did this from the level of the inferior pole of the spleen over the postpyloric area completely exposing the intrapancreatic portion of the superior mesenteric vein.  We ligated the gastroepiploic vessels.  We began to tunnel posterior to the pancreatic neck.    We next worked along the superior border of the pancreas removing the hepatic artery lymph node.  Things were a little bit sticky here consistent with previous inflammation and not tumor.  We ultimately identified the hepatic artery and the gastroduodenal artery.  Upon getting around gastroduodenal artery and occluding it with a vessel loop there was still a great pulse in the liver.  We therefore doubly ligated gastroduodenal artery on the staying side and singly ligated on the specimen side.  We then dissected out and placed a vessel around the bile duct.  With the bile duct retracted laterally and the gastroduodenal artery ligated we had good access to the portal vein and we were able to connect our dissection from above and below placing a Penrose drain.    We transected the distal stomach using a single firing of the JARROD stapler containing a green load.  We removed at most 10% of the stomach, probably less.  We then transected the pancreatic neck using the Penrose drain as a safety guide.  We sent a frozen section from the remnant side and it came back with no evidence of malignancy or high-grade dysplasia.  We then transected the distal bile duct removing the Endo stent and culturing the bile.  Finally we transected the proximal jejunum about 10 or 15 cm distal to the ligament of Treitz.  We controlled the proximal jejunal and distal duodenal mesentery with clamps and ties or LigaSure device where appropriate and fed the bowel underneath the mesenteric vasculature back to the right upper quadrant.    We next had to separate the specimen from the portal vein superior mesenteric vein's.  Mesenteric artery.  That was done in standard fashion using  clamps and ties LigaSure device or suture ligation were appropriate.  Ultimately we delivered the specimen from the operative field and we had the bile duct margin frozen and it showed no evidence of malignancy or high-grade dysplasia.    We copiously irrigated.  We oversewed the jejunal staple line with 3-0 silk Lembert suture.  We oversewed the right side of the gastric staple line with 3-0 silk Lembert suture.  We closed the ligament of Treitz with 3-0 silk suture.    We brought the jejunum up through a rent right side of the transverse mesocolon to fashion our pancreaticojejunostomy.  The pancreas was somewhat atrophic and slightly firm.  The pancreatic duct was 3 at most 4 mm in size.  I did this as a standard handsewn 2 layer invaginating anastomosis.  The outer layer was interrupted 3-0 silk.  The inner layer was a running 3-0 Vicryl.  We irrigated.    Beyond this we fashioned our choledochojejunostomy.  The bile duct was probably 8 may be 10 mm in size.  We did this as a standard handsewn single layer anastomosis with interrupted 4-0 Vicryl.    Finally in an antecolic fashion we performed a gastrojejunostomy.  This was a standard handsewn 2 layer anastomosis.  The outer layers interrupted 3-0 silk.  The inner layer was a running 3-0 Vicryl.  Placed a stitch at the angle of doom.    We performed a final irrigation.  We gently tacked the transverse mesocolon to the a ferret limb of the gastrojejunostomy.  We assisted with the placement of a nasogastric tube in the gastric remnant.  We placed 219 Qatari drains, both were secured at the skin level with two 2-0 Prolene's.  The right drain went posterior the left drain went anterior.    We performed a final sweep and made sure that everything was accounted for.  We closed the fascia with running looped #1 PDS suture.  We irrigated out the subcutaneous tissues and then closed the incision with interrupted 3-0 Vicryl subdermal followed by staples in my standard  dressing.  At the time of my departure from the operating room the plan was for extubation and transport to the recovery room.    Dr. Osei Busby will be billing this case as a modifier 82 assistant surgeon.  There was no qualified resident available to help.  We were scrubbed together the entire case.    Insert dragon        Complications:  None; patient tolerated the procedure well.    Disposition: PACU - hemodynamically stable.  Condition: stable         Additional Details:       Attending Attestation: I was present and scrubbed for the entire procedure.    Alex Dia  Phone Number: [phone]

## 2023-11-07 LAB
ALBUMIN SERPL BCP-MCNC: 3.8 G/DL (ref 3.4–5)
ALP SERPL-CCNC: 62 U/L (ref 33–136)
ALT SERPL W P-5'-P-CCNC: 70 U/L (ref 7–45)
AMYLASE FLD-CCNC: 104 U/L
AMYLASE FLD-CCNC: 34 U/L
ANION GAP SERPL CALC-SCNC: 14 MMOL/L (ref 10–20)
APTT PPP: 30 SECONDS (ref 27–38)
AST SERPL W P-5'-P-CCNC: 89 U/L (ref 9–39)
ATRIAL RATE: 92 BPM
BILIRUB SERPL-MCNC: 0.4 MG/DL (ref 0–1.2)
BUN SERPL-MCNC: 17 MG/DL (ref 6–23)
CALCIUM SERPL-MCNC: 9.1 MG/DL (ref 8.6–10.6)
CHLORIDE SERPL-SCNC: 106 MMOL/L (ref 98–107)
CO2 SERPL-SCNC: 23 MMOL/L (ref 21–32)
CREAT SERPL-MCNC: 0.62 MG/DL (ref 0.5–1.05)
ERYTHROCYTE [DISTWIDTH] IN BLOOD BY AUTOMATED COUNT: 15.3 % (ref 11.5–14.5)
GFR SERPL CREATININE-BSD FRML MDRD: >90 ML/MIN/1.73M*2
GLUCOSE BLD MANUAL STRIP-MCNC: 143 MG/DL (ref 74–99)
GLUCOSE BLD MANUAL STRIP-MCNC: 145 MG/DL (ref 74–99)
GLUCOSE BLD MANUAL STRIP-MCNC: 153 MG/DL (ref 74–99)
GLUCOSE BLD MANUAL STRIP-MCNC: 172 MG/DL (ref 74–99)
GLUCOSE BLD MANUAL STRIP-MCNC: 179 MG/DL (ref 74–99)
GLUCOSE BLD MANUAL STRIP-MCNC: 193 MG/DL (ref 74–99)
GLUCOSE SERPL-MCNC: 144 MG/DL (ref 74–99)
HCT VFR BLD AUTO: 29.8 % (ref 36–46)
HGB BLD-MCNC: 9.2 G/DL (ref 12–16)
INR PPP: 1 (ref 0.9–1.1)
MAGNESIUM SERPL-MCNC: 2.22 MG/DL (ref 1.6–2.4)
MCH RBC QN AUTO: 30.5 PG (ref 26–34)
MCHC RBC AUTO-ENTMCNC: 30.9 G/DL (ref 32–36)
MCV RBC AUTO: 99 FL (ref 80–100)
NRBC BLD-RTO: 0 /100 WBCS (ref 0–0)
P AXIS: -4 DEGREES
P OFFSET: 203 MS
P ONSET: 152 MS
PHOSPHATE SERPL-MCNC: 3.2 MG/DL (ref 2.5–4.9)
PLATELET # BLD AUTO: 281 X10*3/UL (ref 150–450)
POTASSIUM SERPL-SCNC: 3.5 MMOL/L (ref 3.5–5.3)
PR INTERVAL: 142 MS
PROT SERPL-MCNC: 5.9 G/DL (ref 6.4–8.2)
PROTHROMBIN TIME: 11.6 SECONDS (ref 9.8–12.8)
Q ONSET: 223 MS
QRS COUNT: 15 BEATS
QRS DURATION: 68 MS
QT INTERVAL: 356 MS
QTC CALCULATION(BAZETT): 440 MS
QTC FREDERICIA: 410 MS
R AXIS: 39 DEGREES
RBC # BLD AUTO: 3.02 X10*6/UL (ref 4–5.2)
SODIUM SERPL-SCNC: 139 MMOL/L (ref 136–145)
T AXIS: 9 DEGREES
T OFFSET: 401 MS
VENTRICULAR RATE: 92 BPM
WBC # BLD AUTO: 13.4 X10*3/UL (ref 4.4–11.3)

## 2023-11-07 PROCEDURE — 82150 ASSAY OF AMYLASE: CPT

## 2023-11-07 PROCEDURE — 97161 PT EVAL LOW COMPLEX 20 MIN: CPT | Mod: GP

## 2023-11-07 PROCEDURE — 80053 COMPREHEN METABOLIC PANEL: CPT | Performed by: SURGERY

## 2023-11-07 PROCEDURE — C9113 INJ PANTOPRAZOLE SODIUM, VIA: HCPCS | Performed by: SURGERY

## 2023-11-07 PROCEDURE — 2500000004 HC RX 250 GENERAL PHARMACY W/ HCPCS (ALT 636 FOR OP/ED)

## 2023-11-07 PROCEDURE — 96372 THER/PROPH/DIAG INJ SC/IM: CPT

## 2023-11-07 PROCEDURE — 85730 THROMBOPLASTIN TIME PARTIAL: CPT | Performed by: SURGERY

## 2023-11-07 PROCEDURE — 84100 ASSAY OF PHOSPHORUS: CPT | Performed by: SURGERY

## 2023-11-07 PROCEDURE — 85027 COMPLETE CBC AUTOMATED: CPT | Performed by: SURGERY

## 2023-11-07 PROCEDURE — 83735 ASSAY OF MAGNESIUM: CPT | Performed by: SURGERY

## 2023-11-07 PROCEDURE — 2500000004 HC RX 250 GENERAL PHARMACY W/ HCPCS (ALT 636 FOR OP/ED): Performed by: SURGERY

## 2023-11-07 PROCEDURE — 2500000001 HC RX 250 WO HCPCS SELF ADMINISTERED DRUGS (ALT 637 FOR MEDICARE OP): Performed by: SURGERY

## 2023-11-07 PROCEDURE — 96372 THER/PROPH/DIAG INJ SC/IM: CPT | Performed by: SURGERY

## 2023-11-07 PROCEDURE — 36415 COLL VENOUS BLD VENIPUNCTURE: CPT | Performed by: SURGERY

## 2023-11-07 PROCEDURE — 82947 ASSAY GLUCOSE BLOOD QUANT: CPT

## 2023-11-07 PROCEDURE — 1200000002 HC GENERAL ROOM WITH TELEMETRY DAILY

## 2023-11-07 RX ORDER — KETOROLAC TROMETHAMINE 15 MG/ML
15 INJECTION, SOLUTION INTRAMUSCULAR; INTRAVENOUS EVERY 6 HOURS
OUTPATIENT
Start: 2023-11-07 | End: 2023-11-10

## 2023-11-07 RX ORDER — ENOXAPARIN SODIUM 100 MG/ML
40 INJECTION SUBCUTANEOUS EVERY 24 HOURS
Status: DISCONTINUED | OUTPATIENT
Start: 2023-11-07 | End: 2023-11-15 | Stop reason: HOSPADM

## 2023-11-07 RX ORDER — DEXTROSE MONOHYDRATE, SODIUM CHLORIDE, AND POTASSIUM CHLORIDE 50; 1.49; 4.5 G/1000ML; G/1000ML; G/1000ML
20 INJECTION, SOLUTION INTRAVENOUS CONTINUOUS
Status: DISCONTINUED | OUTPATIENT
Start: 2023-11-07 | End: 2023-11-10

## 2023-11-07 RX ORDER — KETOROLAC TROMETHAMINE 15 MG/ML
15 INJECTION, SOLUTION INTRAMUSCULAR; INTRAVENOUS EVERY 6 HOURS PRN
Status: DISPENSED | OUTPATIENT
Start: 2023-11-07 | End: 2023-11-10

## 2023-11-07 RX ORDER — POTASSIUM CHLORIDE 14.9 MG/ML
20 INJECTION INTRAVENOUS
Status: COMPLETED | OUTPATIENT
Start: 2023-11-07 | End: 2023-11-07

## 2023-11-07 RX ORDER — CETIRIZINE HYDROCHLORIDE 10 MG/1
10 TABLET ORAL DAILY
COMMUNITY

## 2023-11-07 RX ORDER — INSULIN GLARGINE 100 [IU]/ML
INJECTION, SOLUTION SUBCUTANEOUS DAILY PRN
COMMUNITY

## 2023-11-07 RX ORDER — KETOROLAC TROMETHAMINE 15 MG/ML
15 INJECTION, SOLUTION INTRAMUSCULAR; INTRAVENOUS EVERY 6 HOURS
Status: DISPENSED | OUTPATIENT
Start: 2023-11-07 | End: 2023-11-10

## 2023-11-07 RX ORDER — HYDROMORPHONE HCL/0.9% NACL/PF 15 MG/30ML
PATIENT CONTROLLED ANALGESIA SYRINGE INTRAVENOUS CONTINUOUS
Status: CANCELLED | OUTPATIENT
Start: 2023-11-07

## 2023-11-07 RX ADMIN — METHOCARBAMOL 1000 MG: 100 INJECTION INTRAMUSCULAR; INTRAVENOUS at 06:53

## 2023-11-07 RX ADMIN — KETOROLAC TROMETHAMINE 15 MG: 15 INJECTION, SOLUTION INTRAMUSCULAR; INTRAVENOUS at 20:08

## 2023-11-07 RX ADMIN — ACETAMINOPHEN 650 MG: 325 TABLET ORAL at 02:07

## 2023-11-07 RX ADMIN — LORAZEPAM 0.5 MG: 2 INJECTION INTRAMUSCULAR; INTRAVENOUS at 09:01

## 2023-11-07 RX ADMIN — METHOCARBAMOL 1000 MG: 100 INJECTION INTRAMUSCULAR; INTRAVENOUS at 14:42

## 2023-11-07 RX ADMIN — POTASSIUM CHLORIDE, DEXTROSE MONOHYDRATE AND SODIUM CHLORIDE 75 ML/HR: 150; 5; 450 INJECTION, SOLUTION INTRAVENOUS at 13:38

## 2023-11-07 RX ADMIN — PANTOPRAZOLE SODIUM 40 MG: 40 INJECTION, POWDER, FOR SOLUTION INTRAVENOUS at 06:53

## 2023-11-07 RX ADMIN — ACETAMINOPHEN 650 MG: 650 SOLUTION ORAL at 18:15

## 2023-11-07 RX ADMIN — POTASSIUM CHLORIDE 20 MEQ: 14.9 INJECTION, SOLUTION INTRAVENOUS at 09:01

## 2023-11-07 RX ADMIN — SODIUM CHLORIDE, POTASSIUM CHLORIDE, SODIUM LACTATE AND CALCIUM CHLORIDE 100 ML/HR: 600; 310; 30; 20 INJECTION, SOLUTION INTRAVENOUS at 00:38

## 2023-11-07 RX ADMIN — KETOROLAC TROMETHAMINE 15 MG: 15 INJECTION, SOLUTION INTRAMUSCULAR; INTRAVENOUS at 14:42

## 2023-11-07 RX ADMIN — POTASSIUM CHLORIDE 20 MEQ: 14.9 INJECTION, SOLUTION INTRAVENOUS at 13:37

## 2023-11-07 RX ADMIN — ONDANSETRON 4 MG: 2 INJECTION INTRAMUSCULAR; INTRAVENOUS at 08:39

## 2023-11-07 RX ADMIN — HEPARIN SODIUM 5000 UNITS: 5000 INJECTION, SOLUTION INTRAVENOUS; SUBCUTANEOUS at 06:54

## 2023-11-07 RX ADMIN — ACETAMINOPHEN 650 MG: 650 SOLUTION ORAL at 13:37

## 2023-11-07 RX ADMIN — METHOCARBAMOL 1000 MG: 100 INJECTION INTRAMUSCULAR; INTRAVENOUS at 22:17

## 2023-11-07 RX ADMIN — ENOXAPARIN SODIUM 40 MG: 100 INJECTION SUBCUTANEOUS at 14:42

## 2023-11-07 RX ADMIN — INSULIN LISPRO 1 UNITS: 100 INJECTION, SOLUTION INTRAVENOUS; SUBCUTANEOUS at 13:37

## 2023-11-07 RX ADMIN — INSULIN LISPRO 1 UNITS: 100 INJECTION, SOLUTION INTRAVENOUS; SUBCUTANEOUS at 18:11

## 2023-11-07 ASSESSMENT — PAIN - FUNCTIONAL ASSESSMENT
PAIN_FUNCTIONAL_ASSESSMENT: 0-10

## 2023-11-07 ASSESSMENT — COGNITIVE AND FUNCTIONAL STATUS - GENERAL
MOBILITY SCORE: 17
HELP NEEDED FOR BATHING: A LITTLE
MOVING FROM LYING ON BACK TO SITTING ON SIDE OF FLAT BED WITH BEDRAILS: A LITTLE
TOILETING: A LITTLE
HELP NEEDED FOR BATHING: A LITTLE
EATING MEALS: A LITTLE
EATING MEALS: A LITTLE
TOILETING: A LITTLE
WALKING IN HOSPITAL ROOM: A LITTLE
CLIMB 3 TO 5 STEPS WITH RAILING: A LOT
DRESSING REGULAR UPPER BODY CLOTHING: A LITTLE
PERSONAL GROOMING: A LITTLE
TURNING FROM BACK TO SIDE WHILE IN FLAT BAD: A LITTLE
STANDING UP FROM CHAIR USING ARMS: A LITTLE
DAILY ACTIVITIY SCORE: 18
MOBILITY SCORE: 18
MOVING TO AND FROM BED TO CHAIR: A LITTLE
DRESSING REGULAR LOWER BODY CLOTHING: A LITTLE
PERSONAL GROOMING: A LITTLE
TURNING FROM BACK TO SIDE WHILE IN FLAT BAD: A LITTLE
MOVING TO AND FROM BED TO CHAIR: A LITTLE
CLIMB 3 TO 5 STEPS WITH RAILING: A LOT
WALKING IN HOSPITAL ROOM: A LITTLE
DRESSING REGULAR LOWER BODY CLOTHING: A LITTLE
DRESSING REGULAR UPPER BODY CLOTHING: A LITTLE
DAILY ACTIVITIY SCORE: 18
STANDING UP FROM CHAIR USING ARMS: A LITTLE

## 2023-11-07 ASSESSMENT — PAIN SCALES - GENERAL
PAINLEVEL_OUTOF10: 8
PAINLEVEL_OUTOF10: 6
PAINLEVEL_OUTOF10: 8
PAINLEVEL_OUTOF10: 10 - WORST POSSIBLE PAIN
PAINLEVEL_OUTOF10: 9

## 2023-11-07 ASSESSMENT — PAIN SCALES - PAIN ASSESSMENT IN ADVANCED DEMENTIA (PAINAD)
TOTALSCORE: 0
BODYLANGUAGE: RELAXED
BREATHING: NORMAL
CONSOLABILITY: NO NEED TO CONSOLE
FACIALEXPRESSION: SMILING OR INEXPRESSIVE

## 2023-11-07 ASSESSMENT — PAIN SCALES - WONG BAKER: WONGBAKER_NUMERICALRESPONSE: HURTS LITTLE BIT

## 2023-11-07 ASSESSMENT — ACTIVITIES OF DAILY LIVING (ADL): ADL_ASSISTANCE: NEEDS ASSISTANCE

## 2023-11-07 NOTE — SIGNIFICANT EVENT
Postoperative Check    Patient is POD #0 for a Whipple with Dr. Dia. Patient reports pain is controlled with the PCA. Denies fevers, chills, chest pain, shortness of breath, nausea, or vomiting. Has not passed flatus or made a bowel movement. Per nurse, NGT was dislodged during transport. Previously secured at 60cm but now secured at 45cm. NGT was advanced to 60cm and an KUB was ordered to confirm placement.    Vitals:    11/06/23 1938   BP: 138/77   Pulse: 83   Resp: 18   Temp: 36.3 °C (97.3 °F)   SpO2: 96%     Physical Exam  General: laying in bed, in no acute distress  GI: soft, appropriately tender, nondistended, midline incision covered with gauze and tegaderm, drains x2 connected to gravity bags in RLQ and LLQ with scant serosanguinous output, NGT in place and functioning appropriately  : orozco in place draining clear yellow urine    A/P  Patient is POD #0 for a Whipple with Dr. Dia, recovering as expected. UOP has been about 0.7 mL/kg/hr.    Plan  - give albumin 250mL bolus per Dr. Busby for perioperative resuscitation   - KUB confirmed NGT placement. Place to LIWS  - NPO w/ IVF  - multimodal pain control    Grace Garcia MD  Surgical Oncology, [facility]  [id number]      --------------  Patient seen and examined at ~2145. Agree with above. Patient recovering as expected, vitals stable, UOP adequate, drains reassuring.    Will continue to monitor    MARY Narvaez    ----------------------------    Patient seen and examined at 0145. UOP 1.3 mL/kg/hr over the past four hours. Patient reports having abdominal pain but says she fell asleep and had not pressed her PCA. PCA dose increased to 0.2 mg/6 min.    Will continue to monitor.    Grace Garcia MD  Surgical Oncology, [facility]  [id number]

## 2023-11-07 NOTE — PROGRESS NOTES
11/07/23 1441   Discharge Planning   Living Arrangements Alone   Support Systems Family members   Assistance Needed Indepdent   Type of Residence Private residence   Number of Stairs to Enter Residence 0   Number of Stairs Within Residence 0   Do you have animals or pets at home? No   Who is requesting discharge planning? Provider   Home or Post Acute Services Post acute facilities (Rehab/SNF/etc)   Type of Post Acute Facility Services Skilled nursing   Patient expects to be discharged to: SNF pending FOC   Does the patient need discharge transport arranged? Yes   RoundTrip coordination needed? Yes   Has discharge transport been arranged? No     Patient lives alone in an apartment, is independent with adls. Patient's sister Mahsa Wilson [phone] is patient's primary support person. Patient denies falls, feels safe at home. Patient is diabetic, checks BS regularly, does not need DM supplies. Spoke to patient regardign PT recommendations, recs SNF. Patient is agreeable. OLGA Yancey to provide patient with a SNF list. Demographics and contacts verified. Will continue to follow discharge needs.

## 2023-11-07 NOTE — ED NOTES
Pharmacy Medication History Review    Laura No is a 68 y.o. female admitted for Pancreatic mass. Pharmacy reviewed the patient's duppn-nh-azpyetiay medications and allergies for accuracy.    The list below reflects the updated PTA list. Comments regarding how patient may be taking medications differently can be found in the Admit Orders Activity    Prior to Admission Medications   Prescriptions Last Dose Informant Patient Reported? Taking?   Creon 12,000-38,000 -60,000 unit capsule Past Month Family Member Yes No   FeroSuL 325 mg (65 mg iron) tablet Past Week Family Member Yes Yes   Sig: Take 1 tablet (325 mg) by mouth once daily.   Milk of Magnesia 400 mg/5 mL suspension Past Week Family Member Yes Yes   Sig: SHAKE LIQUID AND TAKE 30 ML BY MOUTH TWICE DAILY FOR 7 DAYS   Multivitamin Women 50 Plus 8 mg iron-400 mcg-50 mcg tablet Past Month Family Member Yes Yes   Sig: Take 1 tablet by mouth once daily.   OneTouch Delica Plus Lancet 33 gauge misc Unknown Family Member Yes No   OneTouch Ultra Control solution Unknown Family Member Yes No   OneTouch Ultra Test strip Unknown Family Member Yes No   OneTouch Ultra2 Meter misc Unknown Family Member Yes No   Sig: USE AS DIRECTED TO MONITOR BLOOD SUGAR THREE TIMES DAILY AS DIRECTED   OneTouch UltraSoft Lancets misc Unknown Family Member Yes No   Sig: USE AS DIRECTED TO MONITOR BLOOD SUGAR THREE TIMES DAILY AS DIRECTED   PARoxetine (Paxil) 40 mg tablet 11/5/2023 Family Member Yes No   Sig: Take 1 tablet (40 mg) by mouth once daily in the morning. Take before meals.   acetaminophen (Tylenol) 325 mg tablet Past Month Family Member Yes No   amLODIPine (Norvasc) 10 mg tablet Past Month Family Member Yes Yes   Sig: Take 1 tablet (10 mg) by mouth once daily.   atorvastatin (Lipitor) 80 mg tablet Past Month Family Member Yes Yes   Sig: Take 1 tablet (80 mg) by mouth once daily.   cetirizine (ZyrTEC) 10 mg tablet   Yes Yes   Sig: Take 1 tablet (10 mg) by mouth once daily.    clonazePAM (KlonoPIN) 0.5 mg tablet Past Month Family Member Yes Yes   Sig: Take 1 tablet (0.5 mg) by mouth 2 times a day.   cyclobenzaprine (Flexeril) 10 mg tablet Past Month Family Member Yes Yes   Sig: Take 1 tablet (10 mg) by mouth 2 times a day as needed.   diclofenac sodium (Voltaren) 1 % gel gel Past Month Family Member Yes Yes   donepezil (Aricept) 10 mg tablet Past Month Family Member Yes Yes   Sig: Take 1 tablet (10 mg) by mouth once daily in the morning. Take before meals.   fluticasone (Flonase) 50 mcg/actuation nasal spray Past Week Family Member Yes Yes   Sig: SHAKE LIQUID AND USE 1 SPRAY IN EACH NOSTRIL DAILY AT BEDTIME   glucose 4 gram chewable tablet Unknown Family Member Yes No   insulin glargine (Lantus U-100 Insulin) 100 unit/mL injection   Yes No   Sig: SS use Take as directed per insulin instructions. SS for sugars > 200   insulin lispro (HumaLOG) 100 unit/mL injection 11/5/2023 Family Member Yes Yes   Sig: Use as a SS for blood sugars >150 per daughter   latanoprost (Xalatan) 0.005 % ophthalmic solution Past Week Family Member Yes Yes   Sig: INSTILL 1 DROP INTO BOTH EYES EVERY DAY   levothyroxine (Synthroid, Levoxyl) 75 mcg tablet 11/5/2023 Family Member Yes Yes   Sig: Take 1 tablet (75 mcg) by mouth once daily.   lidocaine (Lidoderm) 5 % patch Past Week Family Member Yes Yes   Sig: APPLY 1 PATCH TOPICALLY TO THE SKIN EVERY 24 HOURS FOR 7 DAYS   lisinopril 20 mg tablet 11/5/2023 Family Member Yes Yes   Sig: Take 1 tablet (20 mg) by mouth once daily.   melatonin 5 mg tablet Past Week Family Member Yes Yes   Sig: Take 1 tablet (5 mg) by mouth once daily at bedtime.   metFORMIN (Glucophage) 850 mg tablet Past Month Family Member Yes No   Sig: Take 1 tablet (850 mg) by mouth 2 times a day.   methocarbamol (Robaxin) 500 mg tablet Past Week Family Member Yes Yes   Sig: Take 1 tablet (500 mg) by mouth every 6 hours if needed.   omeprazole (PriLOSEC) 20 mg DR capsule 11/5/2023 Family Member Yes No    Sig: Take 1 capsule (20 mg) by mouth once daily.   ondansetron (Zofran) 4 mg tablet Past Week Family Member Yes Yes   polyethylene glycol (Glycolax, Miralax) 17 gram/dose powder Unknown Family Member Yes No   senna 8.6 mg tablet Past Week Family Member Yes Yes   traMADol (Ultram) 50 mg tablet Past Week Family Member Yes Yes   Sig: Take 1 tablet (50 mg) by mouth.      Facility-Administered Medications: None        The list below reflects the updated allergy list. Please review each documented allergy for additional clarification and justification.  Allergies  Reviewed by Jodi Sullivan RN on 11/6/2023   No Known Allergies         Patient accepts M2B at discharge. Pharmacy has been updated to [facility].    Sources used to complete the med history include patient's daughter and dispense reports    Dinorah Parmar  PGY1 Pharmacy Resident     Meds Amb[facility] Services  Please reach out via [employer] Secure Chat for questions, or if no response call [phone] or [email or web address] “MedRec”

## 2023-11-07 NOTE — PROGRESS NOTES
Occupational Therapy                 Therapy Communication Note    Patient Name: Laura No  MRN: [id number]  Today's Date: 11/7/2023     Discipline: Occupational Therapy    Missed Visit Reason: Missed Visit Reason:  (hold dizzy with PT will re att as appropriate)    Missed Time: Attempt

## 2023-11-07 NOTE — PROGRESS NOTES
"Laura No is a 68 y.o. female on day 1 of admission presenting with Pancreatic mass.    Subjective   Patient had some pain overnight, PCA dosage was increased, and lockout interval time was decreased. Patient was given 250 ml bolus of albumin. Voiding adequately. Saturating appropriately on 2L NC. NG tube only with 50 ml of bilious output, was removed at bedside this AM. Patient sat at edge of bed this AM.      Objective   Constitutional: NAD, resting comfortably in bed  Respiratory: unlabored breathing on 2L NC  Cardiovascular: RR, normotensive  Abdomen: soft, appropriately tender to palpation around incision site, non-distended, midline incision covered with island dressing, c/d/I, LLQ and RLQ drains to gravity, with some serosanguineous output  : orozco intact, draining clear yellow urine   MSK: Normal ROM     Last Recorded Vitals  Blood pressure 163/77, pulse 90, temperature 36 °C (96.8 °F), resp. rate 18, height 1.55 m (5' 1.02\"), weight 71.6 kg (157 lb 13.6 oz), SpO2 95 %.  Intake/Output last 3 Shifts:  I/O last 3 completed shifts:  In: 4020 (56.1 mL/kg) [I.V.:1220 (17 mL/kg); IV Piggyback:2800]  Out: 1885 (26.3 mL/kg) [Urine:1325 (0.5 mL/kg/hr); Emesis/NG output:50; Drains:210; Blood:300]  Weight: 71.6 kg     Relevant Results  Results for orders placed or performed during the hospital encounter of 11/06/23 (from the past 24 hour(s))   BLOOD GAS ARTERIAL FULL PANEL   Result Value Ref Range    POCT pH, Arterial 7.34 (L) 7.38 - 7.42 pH    POCT pCO2, Arterial 41 38 - 42 mm Hg    POCT pO2, Arterial 203 (H) 85 - 95 mm Hg    POCT SO2, Arterial 100 94 - 100 %    POCT Oxy Hemoglobin, Arterial 98.5 (H) 94.0 - 98.0 %    POCT Hematocrit Calculated, Arterial 26.0 (L) 36.0 - 46.0 %    POCT Sodium, Arterial 135 (L) 136 - 145 mmol/L    POCT Potassium, Arterial 3.2 (L) 3.5 - 5.3 mmol/L    POCT Chloride, Arterial 107 98 - 107 mmol/L    POCT Ionized Calcium, Arterial 1.22 1.10 - 1.33 mmol/L    POCT Glucose, Arterial 167 " (H) 74 - 99 mg/dL    POCT Lactate, Arterial 2.7 (H) 0.4 - 2.0 mmol/L    POCT Base Excess, Arterial -3.4 (L) -2.0 - 3.0 mmol/L    POCT HCO3 Calculated, Arterial 22.1 22.0 - 26.0 mmol/L    POCT Hemoglobin, Arterial 8.8 (L) 12.0 - 16.0 g/dL    POCT Anion Gap, Arterial 9 (L) 10 - 25 mmo/L    Patient Temperature 37.0 degrees Celsius    FiO2 50 %   BLOOD GAS ARTERIAL FULL PANEL   Result Value Ref Range    POCT pH, Arterial 7.35 (L) 7.38 - 7.42 pH    POCT pCO2, Arterial 38 38 - 42 mm Hg    POCT pO2, Arterial 189 (H) 85 - 95 mm Hg    POCT SO2, Arterial 98 94 - 100 %    POCT Oxy Hemoglobin, Arterial 97.0 94.0 - 98.0 %    POCT Hematocrit Calculated, Arterial 27.0 (L) 36.0 - 46.0 %    POCT Sodium, Arterial 135 (L) 136 - 145 mmol/L    POCT Potassium, Arterial 3.7 3.5 - 5.3 mmol/L    POCT Chloride, Arterial 108 (H) 98 - 107 mmol/L    POCT Ionized Calcium, Arterial 1.22 1.10 - 1.33 mmol/L    POCT Glucose, Arterial 162 (H) 74 - 99 mg/dL    POCT Lactate, Arterial 2.5 (H) 0.4 - 2.0 mmol/L    POCT Base Excess, Arterial -4.2 (L) -2.0 - 3.0 mmol/L    POCT HCO3 Calculated, Arterial 21.0 (L) 22.0 - 26.0 mmol/L    POCT Hemoglobin, Arterial 9.1 (L) 12.0 - 16.0 g/dL    POCT Anion Gap, Arterial 10 10 - 25 mmo/L    Patient Temperature 37.0 degrees Celsius    FiO2 50 %   BLOOD GAS ARTERIAL FULL PANEL   Result Value Ref Range    POCT pH, Arterial 7.37 (L) 7.38 - 7.42 pH    POCT pCO2, Arterial 36 (L) 38 - 42 mm Hg    POCT pO2, Arterial 168 (H) 85 - 95 mm Hg    POCT SO2, Arterial 100 94 - 100 %    POCT Oxy Hemoglobin, Arterial 97.7 94.0 - 98.0 %    POCT Hematocrit Calculated, Arterial 29.0 (L) 36.0 - 46.0 %    POCT Sodium, Arterial 133 (L) 136 - 145 mmol/L    POCT Potassium, Arterial 3.6 3.5 - 5.3 mmol/L    POCT Chloride, Arterial 107 98 - 107 mmol/L    POCT Ionized Calcium, Arterial 1.20 1.10 - 1.33 mmol/L    POCT Glucose, Arterial 192 (H) 74 - 99 mg/dL    POCT Lactate, Arterial 2.8 (H) 0.4 - 2.0 mmol/L    POCT Base Excess, Arterial -4.0 (L)  -2.0 - 3.0 mmol/L    POCT HCO3 Calculated, Arterial 20.8 (L) 22.0 - 26.0 mmol/L    POCT Hemoglobin, Arterial 9.5 (L) 12.0 - 16.0 g/dL    POCT Anion Gap, Arterial 9 (L) 10 - 25 mmo/L    Patient Temperature 37.0 degrees Celsius    FiO2 40 %   POCT GLUCOSE   Result Value Ref Range    POCT Glucose 171 (H) 74 - 99 mg/dL   BLOOD GAS ARTERIAL FULL PANEL   Result Value Ref Range    POCT pH, Arterial 7.34 (L) 7.38 - 7.42 pH    POCT pCO2, Arterial 40 38 - 42 mm Hg    POCT pO2, Arterial 94 85 - 95 mm Hg    POCT SO2, Arterial 98 94 - 100 %    POCT Oxy Hemoglobin, Arterial 95.5 94.0 - 98.0 %    POCT Hematocrit Calculated, Arterial 29.0 (L) 36.0 - 46.0 %    POCT Sodium, Arterial 135 (L) 136 - 145 mmol/L    POCT Potassium, Arterial 3.6 3.5 - 5.3 mmol/L    POCT Chloride, Arterial 106 98 - 107 mmol/L    POCT Ionized Calcium, Arterial 1.26 1.10 - 1.33 mmol/L    POCT Glucose, Arterial 201 (H) 74 - 99 mg/dL    POCT Lactate, Arterial 2.7 (H) 0.4 - 2.0 mmol/L    POCT Base Excess, Arterial -3.9 (L) -2.0 - 3.0 mmol/L    POCT HCO3 Calculated, Arterial 21.6 (L) 22.0 - 26.0 mmol/L    POCT Hemoglobin, Arterial 9.6 (L) 12.0 - 16.0 g/dL    POCT Anion Gap, Arterial 11 10 - 25 mmo/L    Patient Temperature 37.0 degrees Celsius    FiO2 3 %   CBC   Result Value Ref Range    WBC 9.6 4.4 - 11.3 x10*3/uL    nRBC 0.0 0.0 - 0.0 /100 WBCs    RBC 3.04 (L) 4.00 - 5.20 x10*6/uL    Hemoglobin 9.4 (L) 12.0 - 16.0 g/dL    Hematocrit 28.2 (L) 36.0 - 46.0 %    MCV 93 80 - 100 fL    MCH 30.9 26.0 - 34.0 pg    MCHC 33.3 32.0 - 36.0 g/dL    RDW 14.6 (H) 11.5 - 14.5 %    Platelets 242 150 - 450 x10*3/uL   Comprehensive metabolic panel   Result Value Ref Range    Glucose 196 (H) 74 - 99 mg/dL    Sodium 139 136 - 145 mmol/L    Potassium 3.7 3.5 - 5.3 mmol/L    Chloride 107 98 - 107 mmol/L    Bicarbonate 21 21 - 32 mmol/L    Anion Gap 15 10 - 20 mmol/L    Urea Nitrogen 17 6 - 23 mg/dL    Creatinine 0.50 0.50 - 1.05 mg/dL    eGFR >90 >60 mL/min/1.73m*2    Calcium 8.7 8.6  - 10.6 mg/dL    Albumin 3.7 3.4 - 5.0 g/dL    Alkaline Phosphatase 76 33 - 136 U/L    Total Protein 4.9 (L) 6.4 - 8.2 g/dL     (H) 9 - 39 U/L    Bilirubin, Total 0.5 0.0 - 1.2 mg/dL    ALT 94 (H) 7 - 45 U/L   Magnesium   Result Value Ref Range    Magnesium 1.25 (L) 1.60 - 2.40 mg/dL   Phosphorus   Result Value Ref Range    Phosphorus 3.0 2.5 - 4.9 mg/dL   Protime-INR   Result Value Ref Range    Protime 11.5 9.8 - 12.8 seconds    INR 1.0 0.9 - 1.1   BLOOD GAS ARTERIAL FULL PANEL   Result Value Ref Range    POCT pH, Arterial 7.35 (L) 7.38 - 7.42 pH    POCT pCO2, Arterial 41 38 - 42 mm Hg    POCT pO2, Arterial 112 (H) 85 - 95 mm Hg    POCT SO2, Arterial 99 94 - 100 %    POCT Oxy Hemoglobin, Arterial 96.9 94.0 - 98.0 %    POCT Hematocrit Calculated, Arterial 30.0 (L) 36.0 - 46.0 %    POCT Sodium, Arterial 135 (L) 136 - 145 mmol/L    POCT Potassium, Arterial 3.7 3.5 - 5.3 mmol/L    POCT Chloride, Arterial 107 98 - 107 mmol/L    POCT Ionized Calcium, Arterial 1.29 1.10 - 1.33 mmol/L    POCT Glucose, Arterial 205 (H) 74 - 99 mg/dL    POCT Lactate, Arterial 2.3 (H) 0.4 - 2.0 mmol/L    POCT Base Excess, Arterial -2.8 (L) -2.0 - 3.0 mmol/L    POCT HCO3 Calculated, Arterial 22.6 22.0 - 26.0 mmol/L    POCT Hemoglobin, Arterial 10.1 (L) 12.0 - 16.0 g/dL    POCT Anion Gap, Arterial 9 (L) 10 - 25 mmo/L    Patient Temperature 37.0 degrees Celsius    FiO2 3 %   POCT GLUCOSE   Result Value Ref Range    POCT Glucose 188 (H) 74 - 99 mg/dL   POCT GLUCOSE   Result Value Ref Range    POCT Glucose 139 (H) 74 - 99 mg/dL   CBC   Result Value Ref Range    WBC 13.4 (H) 4.4 - 11.3 x10*3/uL    nRBC 0.0 0.0 - 0.0 /100 WBCs    RBC 3.02 (L) 4.00 - 5.20 x10*6/uL    Hemoglobin 9.2 (L) 12.0 - 16.0 g/dL    Hematocrit 29.8 (L) 36.0 - 46.0 %    MCV 99 80 - 100 fL    MCH 30.5 26.0 - 34.0 pg    MCHC 30.9 (L) 32.0 - 36.0 g/dL    RDW 15.3 (H) 11.5 - 14.5 %    Platelets 281 150 - 450 x10*3/uL   Coagulation Screen   Result Value Ref Range    Protime  11.6 9.8 - 12.8 seconds    INR 1.0 0.9 - 1.1    aPTT 30 27 - 38 seconds   Comprehensive metabolic panel   Result Value Ref Range    Glucose 144 (H) 74 - 99 mg/dL    Sodium 139 136 - 145 mmol/L    Potassium 3.5 3.5 - 5.3 mmol/L    Chloride 106 98 - 107 mmol/L    Bicarbonate 23 21 - 32 mmol/L    Anion Gap 14 10 - 20 mmol/L    Urea Nitrogen 17 6 - 23 mg/dL    Creatinine 0.62 0.50 - 1.05 mg/dL    eGFR >90 >60 mL/min/1.73m*2    Calcium 9.1 8.6 - 10.6 mg/dL    Albumin 3.8 3.4 - 5.0 g/dL    Alkaline Phosphatase 62 33 - 136 U/L    Total Protein 5.9 (L) 6.4 - 8.2 g/dL    AST 89 (H) 9 - 39 U/L    Bilirubin, Total 0.4 0.0 - 1.2 mg/dL    ALT 70 (H) 7 - 45 U/L   Magnesium   Result Value Ref Range    Magnesium 2.22 1.60 - 2.40 mg/dL   Phosphorus   Result Value Ref Range    Phosphorus 3.2 2.5 - 4.9 mg/dL   POCT GLUCOSE   Result Value Ref Range    POCT Glucose 145 (H) 74 - 99 mg/dL   Amylase, Fluid   Result Value Ref Range    Amylase, Fluid 104 Not established. U/L           Assessment/Plan   Principal Problem:    Pancreatic mass    Patient is a 69 yo F who is POD1 s/p whipple for unspecified pancreatic mass. Patient was in some pain overnight, likely because she fell asleep for four hours and was unable to press her PCA. Patient is voiding adequately. Recovering as expected.     - mIVF d5 1/NS 20KCL @ rate of 75  - continue PCA  - continue robaxin  - Toradol 15mg q6  - PT/OT, ambulate as tolerated  - NGT removed at bedside  - orozco in place for one more day  - SSI  - IV protonix  - continue home meds (levothyroxine, paroxetine, holding amlodipine, statin, lisinopril)  - lovenox   - SCD's   - follow up drain amylase     Case was discussed with attending surgeon Dr. Dia.     MD Mendoza HernandezTyler Ville [phone]

## 2023-11-07 NOTE — PROGRESS NOTES
Physical Therapy    Physical Therapy Evaluation    Patient Name: Laura No  MRN: [id number]  Today's Date: 11/7/2023   Time Calculation  Start Time: 0909  Stop Time: 0931  Time Calculation (min): 22 min    Assessment/Plan   PT Assessment  PT Assessment Results: Decreased strength, Decreased range of motion, Decreased endurance, Impaired balance, Decreased mobility, Decreased cognition, Pain  Rehab Prognosis: Good  Evaluation/Treatment Tolerance: Patient limited by fatigue, Patient limited by pain (+dizziness)  Medical Staff Made Aware: Yes  Strengths: Housing layout, Support of Caregivers  End of Session Communication: Physician, Bedside nurse  Assessment Comment: Pt. is a 68 yof that presents with increased pain, decreased functional strength, decreased activity tolerance/endurance, and impaired balance. Pt. will benefit from skilled PT intervention to address these deficits. At this time, pt. requires 24/7 care and is not safe to d/c home alone.  End of Session Patient Position: Bed, 3 rail up, Alarm on (Physician in room)  IP OR SWING BED PT PLAN  Inpatient or Swing Bed: Inpatient  PT Plan  Treatment/Interventions: Bed mobility, Transfer training, Gait training, Stair training, Balance training, Strengthening, Endurance training, Range of motion, Therapeutic exercise, Therapeutic activity, Home exercise program, Postural re-education  PT Plan: Skilled PT  PT Frequency: 3 times per week  PT Discharge Recommendations: Moderate intensity level of continued care  PT Recommended Transfer Status: Other (comment) (unable to determine at this time)  PT - OK to Discharge: Yes (Eval complete, refer to dispo)      Subjective   General Visit Information:  General  Reason for Referral: s/p whipple 11/6  Past Medical History Relevant to Rehab: pancreatic head cancer, HTN, T2DM, hypothyroidism,, GERD, Dementia, OA  Family/Caregiver Present: Yes  Caregiver Feedback: Son and brother in low, supportive  Prior to Session  Communication: Bedside nurse  Patient Position Received: Bed, 3 rail up, Alarm off, not on at start of session  Preferred Learning Style: verbal  General Comment: Pt. received supine in bed, endorsing increased pain though agreeable to participate in session with encouragement. Educated pt. on abdominal precautions and log roll technique prior to mobilizing. (Tele, PCA, orozco, NGT, drain x2, IV, 2.5 L O2)    Home Living:  Home Living  Type of Home: Apartment  Lives With: Alone (Has HHA 8 hours/day for 5 days/week)  Home Adaptive Equipment: Cane (rollator)  Home Layout: One level  Home Access: Elevator  Bathroom Shower/Tub: Tub/shower unit  Bathroom Equipment: Grab bars in shower, Shower chair with back    Prior Level of Function:  Prior Function Per Pt/Caregiver Report  Level of [place]: Needs assistance with ADLs, Needs assistance with homemaking (Selin with transfers and ambulation)  Receives Help From: Home health (Has HHA 8 hours/day for 5 days/week)  ADL Assistance: Needs assistance  Homemaking Assistance: Needs assistance  Ambulatory Assistance: Independent  Transfers: Assistive device (cane or rollator)  Gait: Assistive device (cane or rollator)  Leisure: Enjoys watching TV  Prior Function Comments: Denied falls    Precautions:  Precautions  Medical Precautions: Abdominal precautions, Fall precautions, Oxygen therapy device and L/min (2.5L)  Post-Surgical Precautions: Abdominal surgery precautions    Vital Signs:  Vital Signs  Heart Rate: 90  Heart Rate Source: Monitor  Patient Position: Lying    Objective   Pain:  Pain Assessment  Pain Assessment: 0-10  Pain Score: 10 - Worst possible pain  Pain Type: Surgical pain  Pain Location: Abdomen  Pain Frequency: Constant/continuous  Pain Interventions:  (Educated pt. on utilizing PCA)    Cognition:  Cognition  Orientation Level: Disoriented to time (Oriented to self and hospital. Pt. stated Friday for day. Oriented to month/year)    General  Assessments:  Activity Tolerance  Endurance: Tolerates less than 10 min exercise with changes in vital signs  Early Mobility/Exercise Safety Screen: Proceed with mobilization - No exclusion criteria met  Activity Tolerance Comments: Pt. tolerated sitting EOB ~5 min, limited by pain and dizziness    Sensation  Light Touch: No apparent deficits    Static Sitting Balance  Static Sitting-Comment/Number of Minutes: minAx1, ~5 min  Dynamic Sitting Balance  Dynamic Sitting-Comments: minAx1    Functional Assessments:  Bed Mobility  Bed Mobility: Yes  Bed Mobility 1  Bed Mobility 1: Supine to sitting  Level of Assistance 1: Moderate assistance, Moderate verbal cues  Bed Mobility Comments 1: log roll technique, HOB elevated slightly  Bed Mobility 2  Bed Mobility  2: Sitting to supine  Level of Assistance 2: Moderate assistance, Moderate verbal cues  Bed Mobility Comments 2: increased assist at LEs  Bed Mobility 3  Bed Mobility 3: Scooting  Level of Assistance 3: Maximum assistance, Maximum verbal cues (x2)  Bed Mobility Comments 3: boost    Transfers  Transfer: No (Deferred d/t concern for safety. Pt. endorsing persistent dizziness)    Ambulation/Gait Training  Ambulation/Gait Training Performed: No    Stairs  Stairs: No    Extremity/Trunk Assessments:  RLE   RLE :  (grossly >/= 3+/5 based on antigravity AROM)  LLE   LLE :  (grossly >/= 3+/5 based on antigravity AROM)    Outcome Measures:  [facility] Basic Mobility  Turning from your back to your side while in a flat bed without using bedrails: A lot  Moving from lying on your back to sitting on the side of a flat bed without using bedrails: A lot  Moving to and from bed to chair (including a wheelchair): Total  Standing up from a chair using your arms (e.g. wheelchair or bedside chair): Total  To walk in hospital room: Total  Climbing 3-5 steps with railing: Total  Basic Mobility - Total Score: 8    Encounter Problems       Encounter Problems (Active)       Balance       Patient  to demo static standing with unilateral UE support, performing single UE task with CGAx1, no sway or LOB x 2 mins for functional carryover        Start:  11/07/23    Expected End:  11/21/23               Mobility       Patient will ambulate >/= 50' with CGAx1 and LRAD       Start:  11/07/23    Expected End:  11/21/23            Pt. will tolerate >/= 8 minutes of OOB mobility without seated rest break and VSS to demo improved activity tolerance/endurance.        Start:  11/07/23    Expected End:  11/21/23               Transfers       Patient will perform bed mobility Selin with HOB flat and no rails       Start:  11/07/23    Expected End:  11/21/23            Patient will transfer sit to and from stand with CGAx1 and LRAD       Start:  11/07/23    Expected End:  11/21/23                   Education Documentation  Precautions, taught by Dinorah Santamaria, PT at 11/7/2023 10:42 AM.  Learner: Patient  Readiness: Acceptance  Method: Explanation, Demonstration  Response: Verbalizes Understanding    Body Mechanics, taught by Dinorah Santamaria, PT at 11/7/2023 10:42 AM.  Learner: Patient  Readiness: Acceptance  Method: Explanation, Demonstration  Response: Verbalizes Understanding    Mobility Training, taught by Dinorah Santamaria, PT at 11/7/2023 10:42 AM.  Learner: Patient  Readiness: Acceptance  Method: Explanation, Demonstration  Response: Verbalizes Understanding    Education Comments  No comments found.

## 2023-11-07 NOTE — PROGRESS NOTES
SW spoke with the patient and shred that PT was recommending SNF for rehab. SW explained that insurance will cover short term rehab and provided the patient with the SNF list. Patient wants to speak with her daughter first and will provide choices tomorrow.

## 2023-11-08 LAB
ALBUMIN SERPL BCP-MCNC: 3.4 G/DL (ref 3.4–5)
ALP SERPL-CCNC: 61 U/L (ref 33–136)
ALT SERPL W P-5'-P-CCNC: 44 U/L (ref 7–45)
ANION GAP SERPL CALC-SCNC: 10 MMOL/L (ref 10–20)
AST SERPL W P-5'-P-CCNC: 31 U/L (ref 9–39)
BILIRUB SERPL-MCNC: 0.4 MG/DL (ref 0–1.2)
BUN SERPL-MCNC: 14 MG/DL (ref 6–23)
CALCIUM SERPL-MCNC: 9.1 MG/DL (ref 8.6–10.6)
CHLORIDE SERPL-SCNC: 110 MMOL/L (ref 98–107)
CO2 SERPL-SCNC: 22 MMOL/L (ref 21–32)
CREAT SERPL-MCNC: 0.54 MG/DL (ref 0.5–1.05)
ERYTHROCYTE [DISTWIDTH] IN BLOOD BY AUTOMATED COUNT: 15.2 % (ref 11.5–14.5)
GFR SERPL CREATININE-BSD FRML MDRD: >90 ML/MIN/1.73M*2
GLUCOSE BLD MANUAL STRIP-MCNC: 106 MG/DL (ref 74–99)
GLUCOSE BLD MANUAL STRIP-MCNC: 120 MG/DL (ref 74–99)
GLUCOSE BLD MANUAL STRIP-MCNC: 145 MG/DL (ref 74–99)
GLUCOSE BLD MANUAL STRIP-MCNC: 161 MG/DL (ref 74–99)
GLUCOSE BLD MANUAL STRIP-MCNC: 165 MG/DL (ref 74–99)
GLUCOSE BLD MANUAL STRIP-MCNC: 166 MG/DL (ref 74–99)
GLUCOSE SERPL-MCNC: 150 MG/DL (ref 74–99)
HCT VFR BLD AUTO: 25.4 % (ref 36–46)
HGB BLD-MCNC: 7.8 G/DL (ref 12–16)
MAGNESIUM SERPL-MCNC: 2.13 MG/DL (ref 1.6–2.4)
MCH RBC QN AUTO: 31 PG (ref 26–34)
MCHC RBC AUTO-ENTMCNC: 30.7 G/DL (ref 32–36)
MCV RBC AUTO: 101 FL (ref 80–100)
NRBC BLD-RTO: 0 /100 WBCS (ref 0–0)
PLATELET # BLD AUTO: 184 X10*3/UL (ref 150–450)
POTASSIUM SERPL-SCNC: 4.1 MMOL/L (ref 3.5–5.3)
PROT SERPL-MCNC: 5.4 G/DL (ref 6.4–8.2)
RBC # BLD AUTO: 2.52 X10*6/UL (ref 4–5.2)
SODIUM SERPL-SCNC: 138 MMOL/L (ref 136–145)
WBC # BLD AUTO: 9 X10*3/UL (ref 4.4–11.3)

## 2023-11-08 PROCEDURE — 1200000002 HC GENERAL ROOM WITH TELEMETRY DAILY

## 2023-11-08 PROCEDURE — 2500000004 HC RX 250 GENERAL PHARMACY W/ HCPCS (ALT 636 FOR OP/ED)

## 2023-11-08 PROCEDURE — 97535 SELF CARE MNGMENT TRAINING: CPT | Mod: GO

## 2023-11-08 PROCEDURE — 2500000004 HC RX 250 GENERAL PHARMACY W/ HCPCS (ALT 636 FOR OP/ED): Performed by: SURGERY

## 2023-11-08 PROCEDURE — 2500000001 HC RX 250 WO HCPCS SELF ADMINISTERED DRUGS (ALT 637 FOR MEDICARE OP): Performed by: SURGERY

## 2023-11-08 PROCEDURE — C9113 INJ PANTOPRAZOLE SODIUM, VIA: HCPCS | Performed by: SURGERY

## 2023-11-08 PROCEDURE — 80053 COMPREHEN METABOLIC PANEL: CPT

## 2023-11-08 PROCEDURE — 97165 OT EVAL LOW COMPLEX 30 MIN: CPT | Mod: GO

## 2023-11-08 PROCEDURE — 82947 ASSAY GLUCOSE BLOOD QUANT: CPT

## 2023-11-08 PROCEDURE — 36415 COLL VENOUS BLD VENIPUNCTURE: CPT

## 2023-11-08 PROCEDURE — 2500000001 HC RX 250 WO HCPCS SELF ADMINISTERED DRUGS (ALT 637 FOR MEDICARE OP)

## 2023-11-08 PROCEDURE — 85027 COMPLETE CBC AUTOMATED: CPT

## 2023-11-08 PROCEDURE — 96372 THER/PROPH/DIAG INJ SC/IM: CPT

## 2023-11-08 PROCEDURE — 83735 ASSAY OF MAGNESIUM: CPT

## 2023-11-08 RX ORDER — LEVOTHYROXINE SODIUM 75 UG/1
75 TABLET ORAL DAILY
Status: DISCONTINUED | OUTPATIENT
Start: 2023-11-08 | End: 2023-11-15 | Stop reason: HOSPADM

## 2023-11-08 RX ORDER — ATORVASTATIN CALCIUM 80 MG/1
80 TABLET, FILM COATED ORAL NIGHTLY
Status: DISCONTINUED | OUTPATIENT
Start: 2023-11-08 | End: 2023-11-15 | Stop reason: HOSPADM

## 2023-11-08 RX ORDER — ROSUVASTATIN CALCIUM 20 MG/1
20 TABLET, COATED ORAL EVERY OTHER DAY
Status: DISCONTINUED | OUTPATIENT
Start: 2023-11-09 | End: 2023-11-08

## 2023-11-08 RX ADMIN — METHOCARBAMOL 1000 MG: 100 INJECTION INTRAMUSCULAR; INTRAVENOUS at 23:37

## 2023-11-08 RX ADMIN — Medication 5 MG: at 20:35

## 2023-11-08 RX ADMIN — PAROXETINE 40 MG: 40 TABLET, FILM COATED ORAL at 06:37

## 2023-11-08 RX ADMIN — ACETAMINOPHEN 650 MG: 650 SOLUTION ORAL at 06:37

## 2023-11-08 RX ADMIN — ATORVASTATIN CALCIUM 80 MG: 80 TABLET, FILM COATED ORAL at 20:35

## 2023-11-08 RX ADMIN — LEVOTHYROXINE SODIUM 75 MCG: 75 TABLET ORAL at 06:37

## 2023-11-08 RX ADMIN — ACETAMINOPHEN 650 MG: 325 TABLET ORAL at 13:49

## 2023-11-08 RX ADMIN — ACETAMINOPHEN 650 MG: 650 SOLUTION ORAL at 02:04

## 2023-11-08 RX ADMIN — INSULIN LISPRO 1 UNITS: 100 INJECTION, SOLUTION INTRAVENOUS; SUBCUTANEOUS at 13:49

## 2023-11-08 RX ADMIN — KETOROLAC TROMETHAMINE 15 MG: 15 INJECTION, SOLUTION INTRAMUSCULAR; INTRAVENOUS at 13:48

## 2023-11-08 RX ADMIN — ACETAMINOPHEN 650 MG: 325 TABLET ORAL at 20:35

## 2023-11-08 RX ADMIN — LORAZEPAM 0.5 MG: 2 INJECTION INTRAMUSCULAR; INTRAVENOUS at 22:08

## 2023-11-08 RX ADMIN — POTASSIUM CHLORIDE, DEXTROSE MONOHYDRATE AND SODIUM CHLORIDE 75 ML/HR: 150; 5; 450 INJECTION, SOLUTION INTRAVENOUS at 02:15

## 2023-11-08 RX ADMIN — METHOCARBAMOL 1000 MG: 100 INJECTION INTRAMUSCULAR; INTRAVENOUS at 06:38

## 2023-11-08 RX ADMIN — METHOCARBAMOL 1000 MG: 100 INJECTION INTRAMUSCULAR; INTRAVENOUS at 15:34

## 2023-11-08 RX ADMIN — KETOROLAC TROMETHAMINE 15 MG: 15 INJECTION, SOLUTION INTRAMUSCULAR; INTRAVENOUS at 02:04

## 2023-11-08 RX ADMIN — INSULIN LISPRO 1 UNITS: 100 INJECTION, SOLUTION INTRAVENOUS; SUBCUTANEOUS at 09:25

## 2023-11-08 RX ADMIN — KETOROLAC TROMETHAMINE 15 MG: 15 INJECTION, SOLUTION INTRAMUSCULAR; INTRAVENOUS at 08:19

## 2023-11-08 RX ADMIN — LATANOPROST 1 DROP: 50 SOLUTION OPHTHALMIC at 20:36

## 2023-11-08 RX ADMIN — INSULIN LISPRO 1 UNITS: 100 INJECTION, SOLUTION INTRAVENOUS; SUBCUTANEOUS at 22:09

## 2023-11-08 RX ADMIN — KETOROLAC TROMETHAMINE 15 MG: 15 INJECTION, SOLUTION INTRAMUSCULAR; INTRAVENOUS at 20:35

## 2023-11-08 RX ADMIN — POTASSIUM CHLORIDE, DEXTROSE MONOHYDRATE AND SODIUM CHLORIDE 50 ML/HR: 150; 5; 450 INJECTION, SOLUTION INTRAVENOUS at 20:35

## 2023-11-08 RX ADMIN — ENOXAPARIN SODIUM 40 MG: 100 INJECTION SUBCUTANEOUS at 15:34

## 2023-11-08 RX ADMIN — PANTOPRAZOLE SODIUM 40 MG: 40 INJECTION, POWDER, FOR SOLUTION INTRAVENOUS at 06:37

## 2023-11-08 ASSESSMENT — COGNITIVE AND FUNCTIONAL STATUS - GENERAL
TOILETING: A LITTLE
MOVING FROM LYING ON BACK TO SITTING ON SIDE OF FLAT BED WITH BEDRAILS: A LITTLE
DRESSING REGULAR UPPER BODY CLOTHING: A LITTLE
MOBILITY SCORE: 18
CLIMB 3 TO 5 STEPS WITH RAILING: A LITTLE
EATING MEALS: A LITTLE
TOILETING: A LITTLE
HELP NEEDED FOR BATHING: A LITTLE
DAILY ACTIVITIY SCORE: 20
TOILETING: A LITTLE
TURNING FROM BACK TO SIDE WHILE IN FLAT BAD: A LITTLE
PERSONAL GROOMING: A LITTLE
DRESSING REGULAR LOWER BODY CLOTHING: A LITTLE
HELP NEEDED FOR BATHING: A LITTLE
CLIMB 3 TO 5 STEPS WITH RAILING: A LITTLE
DRESSING REGULAR LOWER BODY CLOTHING: A LITTLE
MOVING FROM LYING ON BACK TO SITTING ON SIDE OF FLAT BED WITH BEDRAILS: A LITTLE
STANDING UP FROM CHAIR USING ARMS: A LITTLE
DAILY ACTIVITIY SCORE: 18
MOBILITY SCORE: 18
WALKING IN HOSPITAL ROOM: A LITTLE
PERSONAL GROOMING: A LITTLE
WALKING IN HOSPITAL ROOM: A LITTLE
TURNING FROM BACK TO SIDE WHILE IN FLAT BAD: A LITTLE
MOVING TO AND FROM BED TO CHAIR: A LITTLE
DAILY ACTIVITIY SCORE: 20
HELP NEEDED FOR BATHING: A LITTLE
PERSONAL GROOMING: A LITTLE
STANDING UP FROM CHAIR USING ARMS: A LITTLE
DRESSING REGULAR LOWER BODY CLOTHING: A LITTLE
MOVING TO AND FROM BED TO CHAIR: A LITTLE

## 2023-11-08 ASSESSMENT — PAIN - FUNCTIONAL ASSESSMENT
PAIN_FUNCTIONAL_ASSESSMENT: 0-10

## 2023-11-08 ASSESSMENT — PAIN SCALES - PAIN ASSESSMENT IN ADVANCED DEMENTIA (PAINAD)
BREATHING: NORMAL
CONSOLABILITY: NO NEED TO CONSOLE
BODYLANGUAGE: RELAXED

## 2023-11-08 ASSESSMENT — PAIN SCALES - GENERAL
PAINLEVEL_OUTOF10: 0 - NO PAIN
PAINLEVEL_OUTOF10: 9
PAINLEVEL_OUTOF10: 8
PAINLEVEL_OUTOF10: 6

## 2023-11-08 ASSESSMENT — ACTIVITIES OF DAILY LIVING (ADL)
BATHING_ASSISTANCE: MINIMAL
ADL_ASSISTANCE: INDEPENDENT
HOME_MANAGEMENT_TIME_ENTRY: 8
HOME_MANAGEMENT_TIME_ENTRY: 8

## 2023-11-08 ASSESSMENT — PAIN SCALES - WONG BAKER: WONGBAKER_NUMERICALRESPONSE: NO HURT

## 2023-11-08 ASSESSMENT — PAIN DESCRIPTION - LOCATION: LOCATION: ABDOMEN

## 2023-11-08 NOTE — PROGRESS NOTES
Occupational Therapy    Evaluation/Treatment    Patient Name: Laura No  MRN: [id number]  : 1955  Today's Date: 23  Time Calculation  Start Time: 1405  Stop Time: 1428  Time Calculation (min): 23 min     Assessment:  OT Assessment: Completion of ADLs impacted by functional strength, activity tolerance, and standing balance deficits. Pt reports she can d/c to sister's house and have 24 hour caregiver support available.  Prognosis: Good  Barriers to Discharge: None  Evaluation/Treatment Tolerance: Patient tolerated treatment well  Medical Staff Made Aware: Yes  End of Session Communication: Bedside nurse  End of Session Patient Position: Up in chair, Alarm on  OT Assessment Results: Decreased ADL status, Decreased upper extremity strength, Decreased endurance, Decreased IADLs  Prognosis: Good  Barriers to Discharge: None  Evaluation/Treatment Tolerance: Patient tolerated treatment well  Medical Staff Made Aware: Yes  Strengths: Capable of completing ADLs semi/independent, Support of Caregivers, Housing layout  Barriers to Participation: Capable of completing ADLs semi/independent  Plan:  Treatment Interventions: ADL retraining, Functional transfer training, UE strengthening/ROM, Endurance training, Equipment evaluation/education, Compensatory technique education  OT Frequency: 2 times per week  OT Discharge Recommendations: Low intensity level of continued care  OT Recommended Transfer Status: Minimal assist, Assist of 1  OT - OK to Discharge: Yes  Treatment Interventions: ADL retraining, Functional transfer training, UE strengthening/ROM, Endurance training, Equipment evaluation/education, Compensatory technique education    Subjective   Current Problem:  1. Pancreatic mass  Surgical Pathology Exam    Surgical Pathology Exam    Sterile Fluid Culture/Smear    Sterile Fluid Culture/Smear        General:   OT Received On: 23  General  Reason for Referral: s/p whipple   Past Medical History  Relevant to Rehab: pancreatic head cancer, HTN, T2DM, hypothyroidism,, GERD, Dementia, OA  Family/Caregiver Present: No  Prior to Session Communication: Bedside nurse  Patient Position Received: Bed, 3 rail up, Alarm off, not on at start of session  Preferred Learning Style: verbal  General Comment: Pt pleasant and agreeable to therapy session  Precautions:  Medical Precautions: Abdominal precautions, Fall precautions, Oxygen therapy device and L/min (2.5 L)  Post-Surgical Precautions: Abdominal surgery precautions  Vital Signs:  Heart Rate: 78  SpO2: 98 %  Patient Position: Sitting  Pain:  Pain Assessment  Pain Assessment: 0-10  Pain Score: 0 - No pain    Objective   Cognition:  Overall Cognitive Status: Within Functional Limits  Orientation Level: Oriented X4    Home Living:  Type of Home: Apartment  Lives With: Alone (Has HHA 8 hours/day for 5 days/week)  Home Adaptive Equipment: Cane (Rollator)  Home Layout: One level  Home Access: Elevator  Bathroom Shower/Tub: Tub/shower unit  Bathroom Toilet: Standard  Bathroom Equipment: Grab bars in shower, Shower chair with back  Prior Function:  Level of Speed: Independent with ADLs and functional transfers (HHA 5 days a week for 8 hours)  Receives Help From: Home health (Has HHA 8 hours/day for 5 days/week)  ADL Assistance: Independent  Homemaking Assistance: Needs assistance  Meal Prep: Minimal  Laundry: Minimal  Vacuuming: Minimal  Cleaning: Minimal  Homemaking Assistance Comments: HHA assists with IADLs  Ambulatory Assistance: Independent  Transfers:  (Cane or rollator)  Leisure: Enjoys watching TV  Prior Function Comments: Denied falls  IADL History:  Homemaking Responsibilities: No  Mode of Transportation: Family  Leisure and Hobbies: TV  ADL:  Eating Assistance: Independent (Anticipated)  Grooming Assistance: Stand by (Anticipated)  Bathing Assistance: Minimal (Anticipated)  UE Dressing Assistance: Stand by  UE Dressing Deficit: Setup  LE Dressing  Assistance: Minimal (Anticipated)  Toileting Assistance with Device:  (Anticipate CGA)  Functional Assistance: Minimal  Activities of Daily Living:      UE Dressing  UE Dressing Level of Assistance: Setup  UE Dressing Comments: Setup assistance provided prior to pt donning jacket while sitting up in chair, cueing provided for safety - education on dressing while maintaining abdominal precautions    LE Dressing  LE Dressing: Yes  Sock Level of Assistance: Close supervision  LE Dressing Where Assessed: Chair  LE Dressing Comments: Education provided on modified dressing strategy including figure four to maintain abdmonial precautions during dressing. Pt implements effectively with SBA to ensure her safety.       Activity Tolerance:  Endurance: Tolerates less than 10 min exercise, no significant change in vital signs  Functional Standing Tolerance:     Bed Mobility/Transfers: Bed Mobility  Bed Mobility: Yes  Bed Mobility 1  Bed Mobility 1: Supine to sitting  Level of Assistance 1: Contact guard  Bed Mobility Comments 1: Education provided on log roll technique to maintain abdominal precautions, cueing provided for reinforcement with CGA for safety.    Transfers  Transfer: Yes  Transfer 1  Transfer From 1: Bed to  Transfer to 1: Chair with arms  Technique 1: Stand pivot  Transfer Device 1: Walker  Transfer Level of Assistance 1: Contact guard  Trials/Comments 1: Cueing for safety throughout - Education on walker safety including pushing up from bed and reaching back to chair arm rests when sitting, pt receptive to education.    Sitting Balance:  Static Sitting Balance  Static Sitting-Balance Support: No upper extremity supported  Static Sitting-Level of Assistance: Distant supervision  Standing Balance:  Static Standing Balance  Static Standing-Balance Support: Bilateral upper extremity supported  Static Standing-Level of Assistance: Contact guard  Static Standing-Comment/Number of Minutes: Standing with use of FWW for  balance  Dynamic Standing Balance  Dynamic Standing-Balance Support: Bilateral upper extremity supported  Dynamic Standing-Comments: transfer bed to chair using FWW      Vision:Vision - Basic Assessment  Current Vision: Wears glasses all the time  Sensation:  Light Touch: No apparent deficits  Strength:  Strength Comments: 4+/5 grossly  Other Activity:        Perception:  Inattention/Neglect: Appears intact  Coordination:  Movements are Fluid and Coordinated: Yes   Hand Function:  Hand Function  Gross Grasp: Functional  Coordination: Functional  Extremities: RUE   RUE : Within Functional Limits and LUE   LUE: Within Functional Limits    Outcome Measures: [facility] Daily Activity  Putting on and taking off regular lower body clothing: A little  Bathing (including washing, rinsing, drying): A little  Putting on and taking off regular upper body clothing: None  Toileting, which includes using toilet, bedpan or urinal: A little  Taking care of personal grooming such as brushing teeth: A little  Eating Meals: None  Daily Activity - Total Score: 20     and Brief Confusion Assessment Method (bCAM)  Feature 1: Altered Mental Status or Fluctuating Course: No  CAM Result: CAM -    Education Documentation  ADL Training, taught by Raheel Ramirez OT at 11/8/2023  2:51 PM.  Learner: Patient  Readiness: Acceptance  Method: Explanation, Demonstration  Response: Verbalizes Understanding, Demonstrated Understanding    Education Comments  No comments found.      Goals:  Encounter Problems       Encounter Problems (Active)       ADLs       Patient will perform UB and LB sponge bath with supervision level of assistance and AE as needed. (Progressing)       Start:  11/08/23    Expected End:  11/29/23            Patient with complete lower body dressing with modified independent level of assistance donning and doffing pants without a zipper/fasteners with PRN adaptive equipment while edge of bed  (Progressing)       Start:  11/08/23     Expected End:  11/29/23            Patient will complete daily grooming tasks grooming task with supervision level of assistance and PRN adaptive equipment while standing. (Progressing)       Start:  11/08/23    Expected End:  11/29/23               BALANCE       Pt will maintain dynamic standing balance during ADL task with supervision level of assistance in order to demonstrate decreased risk of falling and improved postural control. (Progressing)       Start:  11/08/23    Expected End:  11/29/23                       EXERCISE/STRENGTHENING       Patient with increase BUE to 5/5 strength. (Progressing)       Start:  11/08/23    Expected End:  11/29/23               MOBILITY       Patient will perform Functional mobility Household distances/Community Distances with supervision level of assistance and least restrictive device in order to improve safety and functional mobility. (Progressing)       Start:  11/08/23    Expected End:  11/29/23                 TRANSFERS       Patient will complete functional transfers with least restrictive device with supervision level of assistance. (Progressing)       Start:  11/08/23    Expected End:  11/29/23               HOMA JACINTO OT

## 2023-11-08 NOTE — PROGRESS NOTES
Laura No is a 68 y.o. female on day 2 of admission presenting with Pancreatic mass.    TCC Note:  Spoke to patient regarding SNF [facility]. Patient states her daughter has the list and will bring it back later today. Plan to follow up with [facility] and send SNF referrals.

## 2023-11-08 NOTE — CARE PLAN
Problem: Pain  Goal: Takes deep breaths with improved pain control throughout the shift  Outcome: Progressing  Goal: Turns in bed with improved pain control throughout the shift  Outcome: Progressing  Goal: Walks with improved pain control throughout the shift  Outcome: Progressing  Goal: Performs ADL's with improved pain control throughout shift  Outcome: Progressing  Goal: Participates in PT with improved pain control throughout the shift  Outcome: Progressing  Goal: Free from opioid side effects throughout the shift  Outcome: Progressing  Goal: Free from acute confusion related to pain meds throughout the shift  Outcome: Progressing     Problem: Skin  Goal: Decreased wound size/increased tissue granulation at next dressing change  Outcome: Progressing  Goal: Participates in plan/prevention/treatment measures  Outcome: Progressing  Goal: Prevent/manage excess moisture  Outcome: Progressing  Goal: Prevent/minimize sheer/friction injuries  Outcome: Progressing  Goal: Promote/optimize nutrition  Outcome: Progressing  Goal: Promote skin healing  Outcome: Progressing   The patient's goals for the shift include      The clinical goals for the shift include pt pain will be managed with PCA    Pt remained safe and free from injury. VSS on 2L NC. NSR on tele. Pain managed with PCA. Bernard intact, adequate urine output. No further concerns at this time. Care relinquished.

## 2023-11-08 NOTE — PROGRESS NOTES
"Laura No is a 68 y.o. female on day 2 of admission presenting with Pancreatic mass.    Subjective   Patient's pain improved this morning. Reports passing flatus.      Objective   Constitutional: NAD, resting comfortably in bed  Respiratory: unlabored breathing  Cardiovascular: RR, normotensive  Abdomen: soft, appropriately tender to palpation around incision site, non-distended, midline incision covered with island dressing, c/d/I, LLQ and RLQ drains to gravity, with some serosanguineous output  : orozco intact, draining clear yellow urine   MSK: Normal ROM     Last Recorded Vitals  Blood pressure 146/73, pulse 78, temperature 36.5 °C (97.7 °F), resp. rate 18, height 1.55 m (5' 1.02\"), weight 71.6 kg (157 lb 13.6 oz), SpO2 98 %.  Intake/Output last 3 Shifts:  I/O last 3 completed shifts:  In: 2083.5 (29.1 mL/kg) [I.V.:910 (12.7 mL/kg); IV Piggyback:1173.5]  Out: 2375 (33.2 mL/kg) [Urine:1700 (0.7 mL/kg/hr); Emesis/NG output:450; Drains:225]  Weight: 71.6 kg     Relevant Results  Results for orders placed or performed during the hospital encounter of 11/06/23 (from the past 24 hour(s))   POCT GLUCOSE   Result Value Ref Range    POCT Glucose 193 (H) 74 - 99 mg/dL   POCT GLUCOSE   Result Value Ref Range    POCT Glucose 179 (H) 74 - 99 mg/dL   POCT GLUCOSE   Result Value Ref Range    POCT Glucose 143 (H) 74 - 99 mg/dL   POCT GLUCOSE   Result Value Ref Range    POCT Glucose 106 (H) 74 - 99 mg/dL   POCT GLUCOSE   Result Value Ref Range    POCT Glucose 145 (H) 74 - 99 mg/dL   Comprehensive Metabolic Panel   Result Value Ref Range    Glucose 150 (H) 74 - 99 mg/dL    Sodium 138 136 - 145 mmol/L    Potassium 4.1 3.5 - 5.3 mmol/L    Chloride 110 (H) 98 - 107 mmol/L    Bicarbonate 22 21 - 32 mmol/L    Anion Gap 10 10 - 20 mmol/L    Urea Nitrogen 14 6 - 23 mg/dL    Creatinine 0.54 0.50 - 1.05 mg/dL    eGFR >90 >60 mL/min/1.73m*2    Calcium 9.1 8.6 - 10.6 mg/dL    Albumin 3.4 3.4 - 5.0 g/dL    Alkaline Phosphatase 61 33 - " 136 U/L    Total Protein 5.4 (L) 6.4 - 8.2 g/dL    AST 31 9 - 39 U/L    Bilirubin, Total 0.4 0.0 - 1.2 mg/dL    ALT 44 7 - 45 U/L   CBC   Result Value Ref Range    WBC 9.0 4.4 - 11.3 x10*3/uL    nRBC 0.0 0.0 - 0.0 /100 WBCs    RBC 2.52 (L) 4.00 - 5.20 x10*6/uL    Hemoglobin 7.8 (L) 12.0 - 16.0 g/dL    Hematocrit 25.4 (L) 36.0 - 46.0 %     (H) 80 - 100 fL    MCH 31.0 26.0 - 34.0 pg    MCHC 30.7 (L) 32.0 - 36.0 g/dL    RDW 15.2 (H) 11.5 - 14.5 %    Platelets 184 150 - 450 x10*3/uL   Magnesium   Result Value Ref Range    Magnesium 2.13 1.60 - 2.40 mg/dL   POCT GLUCOSE   Result Value Ref Range    POCT Glucose 166 (H) 74 - 99 mg/dL   POCT GLUCOSE   Result Value Ref Range    POCT Glucose 165 (H) 74 - 99 mg/dL           Assessment/Plan   Principal Problem:    Pancreatic mass    Patient is a 69 yo F who is POD2 s/p whipple for unspecified pancreatic mass. She is recovering as expected on the regular nursing floor.    Plan:   - Decrease mIVF d5 1/NS 20KCL @ rate of 50  - Continue PCA  - Continue robaxin  - Toradol 15mg q6  - PT/OT, ambulate as tolerated  - Bernard removed, TOV   - SSI  - Oral protonix  - Continue appropriate home medications (paroxetine, donepezil, melatonin, statin, levothyroxine, holding amlodipine, lisinopril, Creon)  - Lovenox   - SCD's     Case was discussed with attending surgeon Dr. Dia.     Frances Padilla MD  [place] [id number]

## 2023-11-09 LAB
ALBUMIN SERPL BCP-MCNC: 3.4 G/DL (ref 3.4–5)
ALP SERPL-CCNC: 79 U/L (ref 33–136)
ALT SERPL W P-5'-P-CCNC: 35 U/L (ref 7–45)
AMYLASE FLD-CCNC: 11 U/L
AMYLASE FLD-CCNC: 17 U/L
ANION GAP SERPL CALC-SCNC: 14 MMOL/L (ref 10–20)
AST SERPL W P-5'-P-CCNC: 15 U/L (ref 9–39)
BILIRUB SERPL-MCNC: 0.4 MG/DL (ref 0–1.2)
BUN SERPL-MCNC: 10 MG/DL (ref 6–23)
CALCIUM SERPL-MCNC: 9.3 MG/DL (ref 8.6–10.6)
CHLORIDE SERPL-SCNC: 110 MMOL/L (ref 98–107)
CO2 SERPL-SCNC: 17 MMOL/L (ref 21–32)
CREAT SERPL-MCNC: 0.46 MG/DL (ref 0.5–1.05)
ERYTHROCYTE [DISTWIDTH] IN BLOOD BY AUTOMATED COUNT: 14.8 % (ref 11.5–14.5)
GFR SERPL CREATININE-BSD FRML MDRD: >90 ML/MIN/1.73M*2
GLUCOSE BLD MANUAL STRIP-MCNC: 147 MG/DL (ref 74–99)
GLUCOSE BLD MANUAL STRIP-MCNC: 147 MG/DL (ref 74–99)
GLUCOSE BLD MANUAL STRIP-MCNC: 149 MG/DL (ref 74–99)
GLUCOSE BLD MANUAL STRIP-MCNC: 179 MG/DL (ref 74–99)
GLUCOSE SERPL-MCNC: 147 MG/DL (ref 74–99)
HCT VFR BLD AUTO: 25.1 % (ref 36–46)
HGB BLD-MCNC: 7.5 G/DL (ref 12–16)
MAGNESIUM SERPL-MCNC: 1.88 MG/DL (ref 1.6–2.4)
MCH RBC QN AUTO: 29.3 PG (ref 26–34)
MCHC RBC AUTO-ENTMCNC: 29.9 G/DL (ref 32–36)
MCV RBC AUTO: 98 FL (ref 80–100)
NRBC BLD-RTO: 0 /100 WBCS (ref 0–0)
PLATELET # BLD AUTO: 246 X10*3/UL (ref 150–450)
POTASSIUM SERPL-SCNC: 4 MMOL/L (ref 3.5–5.3)
PROT SERPL-MCNC: 5.5 G/DL (ref 6.4–8.2)
RBC # BLD AUTO: 2.56 X10*6/UL (ref 4–5.2)
SODIUM SERPL-SCNC: 137 MMOL/L (ref 136–145)
WBC # BLD AUTO: 8.4 X10*3/UL (ref 4.4–11.3)

## 2023-11-09 PROCEDURE — 96372 THER/PROPH/DIAG INJ SC/IM: CPT

## 2023-11-09 PROCEDURE — 97116 GAIT TRAINING THERAPY: CPT | Mod: GP

## 2023-11-09 PROCEDURE — 2500000004 HC RX 250 GENERAL PHARMACY W/ HCPCS (ALT 636 FOR OP/ED): Performed by: SURGERY

## 2023-11-09 PROCEDURE — 82947 ASSAY GLUCOSE BLOOD QUANT: CPT

## 2023-11-09 PROCEDURE — 2500000001 HC RX 250 WO HCPCS SELF ADMINISTERED DRUGS (ALT 637 FOR MEDICARE OP)

## 2023-11-09 PROCEDURE — 36415 COLL VENOUS BLD VENIPUNCTURE: CPT

## 2023-11-09 PROCEDURE — 2500000001 HC RX 250 WO HCPCS SELF ADMINISTERED DRUGS (ALT 637 FOR MEDICARE OP): Performed by: SURGERY

## 2023-11-09 PROCEDURE — 1200000002 HC GENERAL ROOM WITH TELEMETRY DAILY

## 2023-11-09 PROCEDURE — 85027 COMPLETE CBC AUTOMATED: CPT

## 2023-11-09 PROCEDURE — 80053 COMPREHEN METABOLIC PANEL: CPT

## 2023-11-09 PROCEDURE — 2500000004 HC RX 250 GENERAL PHARMACY W/ HCPCS (ALT 636 FOR OP/ED)

## 2023-11-09 PROCEDURE — 83735 ASSAY OF MAGNESIUM: CPT

## 2023-11-09 PROCEDURE — 82150 ASSAY OF AMYLASE: CPT

## 2023-11-09 RX ORDER — MAGNESIUM SULFATE HEPTAHYDRATE 40 MG/ML
2 INJECTION, SOLUTION INTRAVENOUS ONCE
Status: COMPLETED | OUTPATIENT
Start: 2023-11-09 | End: 2023-11-09

## 2023-11-09 RX ADMIN — INSULIN LISPRO 1 UNITS: 100 INJECTION, SOLUTION INTRAVENOUS; SUBCUTANEOUS at 06:15

## 2023-11-09 RX ADMIN — LEVOTHYROXINE SODIUM 75 MCG: 75 TABLET ORAL at 06:19

## 2023-11-09 RX ADMIN — BENZOCAINE AND MENTHOL 1 LOZENGE: 15; 3.6 LOZENGE ORAL at 16:24

## 2023-11-09 RX ADMIN — METHOCARBAMOL 1000 MG: 100 INJECTION INTRAMUSCULAR; INTRAVENOUS at 22:52

## 2023-11-09 RX ADMIN — DONEPEZIL HYDROCHLORIDE 10 MG: 10 TABLET, FILM COATED ORAL at 08:00

## 2023-11-09 RX ADMIN — ACETAMINOPHEN 650 MG: 325 TABLET ORAL at 19:15

## 2023-11-09 RX ADMIN — KETOROLAC TROMETHAMINE 15 MG: 15 INJECTION, SOLUTION INTRAMUSCULAR; INTRAVENOUS at 08:09

## 2023-11-09 RX ADMIN — Medication 5 MG: at 21:24

## 2023-11-09 RX ADMIN — KETOROLAC TROMETHAMINE 15 MG: 15 INJECTION, SOLUTION INTRAMUSCULAR; INTRAVENOUS at 21:24

## 2023-11-09 RX ADMIN — KETOROLAC TROMETHAMINE 15 MG: 15 INJECTION, SOLUTION INTRAMUSCULAR; INTRAVENOUS at 02:25

## 2023-11-09 RX ADMIN — METHOCARBAMOL 1000 MG: 100 INJECTION INTRAMUSCULAR; INTRAVENOUS at 06:15

## 2023-11-09 RX ADMIN — MAGNESIUM SULFATE HEPTAHYDRATE 2 G: 40 INJECTION, SOLUTION INTRAVENOUS at 10:30

## 2023-11-09 RX ADMIN — PANTOPRAZOLE SODIUM 40 MG: 40 TABLET, DELAYED RELEASE ORAL at 06:15

## 2023-11-09 RX ADMIN — ENOXAPARIN SODIUM 40 MG: 100 INJECTION SUBCUTANEOUS at 14:22

## 2023-11-09 RX ADMIN — ACETAMINOPHEN 650 MG: 325 TABLET ORAL at 13:00

## 2023-11-09 RX ADMIN — LATANOPROST 1 DROP: 50 SOLUTION OPHTHALMIC at 21:24

## 2023-11-09 RX ADMIN — KETOROLAC TROMETHAMINE 15 MG: 15 INJECTION, SOLUTION INTRAMUSCULAR; INTRAVENOUS at 14:21

## 2023-11-09 RX ADMIN — ACETAMINOPHEN 650 MG: 325 TABLET ORAL at 06:15

## 2023-11-09 RX ADMIN — ATORVASTATIN CALCIUM 80 MG: 80 TABLET, FILM COATED ORAL at 21:24

## 2023-11-09 RX ADMIN — PAROXETINE 40 MG: 40 TABLET, FILM COATED ORAL at 06:15

## 2023-11-09 RX ADMIN — METHOCARBAMOL 1000 MG: 100 INJECTION INTRAMUSCULAR; INTRAVENOUS at 14:22

## 2023-11-09 ASSESSMENT — COGNITIVE AND FUNCTIONAL STATUS - GENERAL
MOVING TO AND FROM BED TO CHAIR: A LITTLE
MOBILITY SCORE: 17
WALKING IN HOSPITAL ROOM: A LITTLE
PERSONAL GROOMING: A LITTLE
TURNING FROM BACK TO SIDE WHILE IN FLAT BAD: A LITTLE
STANDING UP FROM CHAIR USING ARMS: A LITTLE
STANDING UP FROM CHAIR USING ARMS: A LITTLE
HELP NEEDED FOR BATHING: A LITTLE
TOILETING: A LITTLE
WALKING IN HOSPITAL ROOM: A LITTLE
MOVING TO AND FROM BED TO CHAIR: A LITTLE
MOVING FROM LYING ON BACK TO SITTING ON SIDE OF FLAT BED WITH BEDRAILS: A LITTLE
CLIMB 3 TO 5 STEPS WITH RAILING: A LOT
DRESSING REGULAR LOWER BODY CLOTHING: A LITTLE
DAILY ACTIVITIY SCORE: 20
CLIMB 3 TO 5 STEPS WITH RAILING: A LITTLE
MOBILITY SCORE: 20

## 2023-11-09 ASSESSMENT — PAIN SCALES - WONG BAKER: WONGBAKER_NUMERICALRESPONSE: NO HURT

## 2023-11-09 ASSESSMENT — PAIN SCALES - GENERAL
PAINLEVEL_OUTOF10: 7
PAINLEVEL_OUTOF10: 4
PAINLEVEL_OUTOF10: 6

## 2023-11-09 ASSESSMENT — PAIN - FUNCTIONAL ASSESSMENT
PAIN_FUNCTIONAL_ASSESSMENT: 0-10

## 2023-11-09 NOTE — PROGRESS NOTES
"Laura No is a 68 y.o. female on day 3 of admission presenting with Pancreatic mass.    Subjective   No acute events overnight. Patient states that her pain is better controlled. Tolerated some liquids yesterday. Denies any nausea or vomiting. Is passing gas, but has not yet had a bowel movement. Voiding adequately. On Room Air.        Objective   Constitutional: NAD, resting comfortably in bed  Respiratory: unlabored breathing  Cardiovascular: RR, normotensive  Abdomen: soft, appropriately tender to palpation around incision site, non-distended, midline incision covered with island dressing, c/d/I, LLQ and RLQ drains to gravity, with some serosanguineous output  : orozco intact, draining clear yellow urine   MSK: Normal ROM     Last Recorded Vitals  Blood pressure 135/74, pulse 82, temperature 37 °C (98.6 °F), temperature source Temporal, resp. rate 18, height 1.55 m (5' 1.02\"), weight 71.6 kg (157 lb 13.6 oz), SpO2 96 %.  Intake/Output last 3 Shifts:  I/O last 3 completed shifts:  In: 2248.5 (31.4 mL/kg) [P.O.:600; IV Piggyback:1648.5]  Out: 2469 (34.5 mL/kg) [Urine:2275 (0.9 mL/kg/hr); Drains:194]  Weight: 71.6 kg     Relevant Results  Results for orders placed or performed during the hospital encounter of 11/06/23 (from the past 24 hour(s))   POCT GLUCOSE   Result Value Ref Range    POCT Glucose 166 (H) 74 - 99 mg/dL   POCT GLUCOSE   Result Value Ref Range    POCT Glucose 165 (H) 74 - 99 mg/dL   POCT GLUCOSE   Result Value Ref Range    POCT Glucose 120 (H) 74 - 99 mg/dL   POCT GLUCOSE   Result Value Ref Range    POCT Glucose 161 (H) 74 - 99 mg/dL   POCT GLUCOSE   Result Value Ref Range    POCT Glucose 149 (H) 74 - 99 mg/dL   POCT GLUCOSE   Result Value Ref Range    POCT Glucose 179 (H) 74 - 99 mg/dL     Scheduled medications  acetaminophen, 650 mg, oral, q6h   Or  acetaminophen, 650 mg, nasogastric tube, q6h   Or  acetaminophen, 650 mg, rectal, q6h  [Held by provider] amLODIPine, 10 mg, oral, " Daily  atorvastatin, 80 mg, oral, Nightly  donepezil, 10 mg, oral, Daily before breakfast  enoxaparin, 40 mg, subcutaneous, q24h  insulin lispro, 0-5 Units, subcutaneous, q4h while awake  ketorolac, 15 mg, intravenous, q6h  latanoprost, 1 drop, Both Eyes, Nightly  levothyroxine, 75 mcg, oral, Daily  [Held by provider] lisinopril, 20 mg, oral, Daily  melatonin, 5 mg, oral, Nightly  methocarbamol, 1,000 mg, intravenous, q8h  [Held by provider] pancrelipase (Lip-Prot-Amyl), 2 capsule, oral, TID with meals  pantoprazole, 40 mg, oral, Daily before breakfast   Or  pantoprazole, 40 mg, intravenous, Daily before breakfast  PARoxetine, 40 mg, oral, Daily before breakfast      Continuous medications  potassium [id number]-T7-6.45%NaCl, 20 mL/hr, Last Rate: 20 mL/hr (11/09/23 0719)  HYDROmorphone,       PRN medications  PRN medications: dextrose 10 % in water (D10W), dextrose, glucagon, ketorolac, LORazepam, ondansetron ODT **OR** ondansetron, phenoL, promethazine **OR** promethazine      Assessment/Plan   Principal Problem:    Pancreatic mass    Patient is a 69 yo F who is POD3 s/p whipple for unspecified pancreatic mass. She is recovering as expected on the regular nursing floor.     Plan:   - Decrease mIVF d5 1/NS 20KCL @ rate of 20  - unlimited clears   - Continue PCA  - Continue robaxin  - Toradol 15mg q6  - PT/OT, ambulate as tolerated  - SSI  - Oral protonix  - Continue appropriate home medications (paroxetine, donepezil, melatonin, statin, levothyroxine, holding amlodipine, lisinopril, Creon)  - Lovenox   - SCD's        MD Souleymane Hernandez [id number]

## 2023-11-09 NOTE — PROGRESS NOTES
SW spoke with the patient ans she provided her choices for SNF facilities. SW placed referrals in [facility] to [facility], [facility], [facility] and [facility] and they are listed in order of preference.Pt. will be ready for discharge on 11/13 and will need a LOC.  Will continue to follow with planning for pt.'s care at discharge.

## 2023-11-09 NOTE — CARE PLAN
Problem: Pain  Goal: Takes deep breaths with improved pain control throughout the shift  Outcome: Progressing  Goal: Turns in bed with improved pain control throughout the shift  Outcome: Progressing  Goal: Walks with improved pain control throughout the shift  Outcome: Progressing  Goal: Performs ADL's with improved pain control throughout shift  Outcome: Progressing  Goal: Participates in PT with improved pain control throughout the shift  Outcome: Progressing  Goal: Free from opioid side effects throughout the shift  Outcome: Progressing  Goal: Free from acute confusion related to pain meds throughout the shift  Outcome: Progressing     Problem: Skin  Goal: Decreased wound size/increased tissue granulation at next dressing change  Outcome: Progressing  Goal: Participates in plan/prevention/treatment measures  Outcome: Progressing  Goal: Prevent/manage excess moisture  Outcome: Progressing  Goal: Prevent/minimize sheer/friction injuries  Outcome: Progressing  Goal: Promote/optimize nutrition  Outcome: Progressing  Goal: Promote skin healing  Outcome: Progressing   The patient's goals for the shift include  pain mgmt    The clinical goals for the shift include pt pain will be managed with PCA    Pt remained safe and free from injury. VSS on RA, NSR on tele. Pain managed with PCA and scheduled meds. Adequate urine o/p following orozco removal. Pts daughter updated on IV status and concerns about bowel regimen. Educated on return of bowel function s/p surgery. Pt and family verbalized understanding. No further concerns at this time.

## 2023-11-09 NOTE — HOSPITAL COURSE
Laura No is a 68 yr old male s/p 1. Whipple Procedure 2. Excisional Wedge Liver Biopsy, x2 on 11/6.  NGT tube removed on POD1 (11/7). Home meds were continued(levothyroxine, paroxetine, holding amlodipine, statin, lisinopril) on POD1 (11/7). Bernard removed POD2 (11/8), and patient passed TOV. Patient continued to improve with increased ambulation. POD5 patient had return of bowel function. Patient had developed UTI (has a history of frequent UTIs) and is being treated with bactrim. On day of discharge, patient was tolerating a regular diet, pain was adequately controlled with PO pain medications. Pt was ambulatory and voiding on own without difficulty. Patient will follow-up in a 2-3 weeks for post-check on 11/21. Patient will be discharged on lovenox for a total of 28 days and a PPI.

## 2023-11-09 NOTE — PROGRESS NOTES
Physical Therapy    Physical Therapy Treatment    Patient Name: Laura No  MRN: [id number]  Today's Date: 11/9/2023  Time Calculation  Start Time: 1140  Stop Time: 1154  Time Calculation (min): 14 min       Assessment/Plan   PT Assessment  PT Assessment Results: Decreased strength, Decreased range of motion, Decreased endurance, Impaired balance, Decreased mobility, Decreased cognition, Pain  Rehab Prognosis: Good  Evaluation/Treatment Tolerance: Patient tolerated treatment well  Medical Staff Made Aware: Yes  Strengths: Housing layout, Support of Caregivers  End of Session Communication: Bedside nurse  Assessment Comment: Pt. is a 68 yof that presents with increased pain, decreased functional strength, decreased activity tolerance/endurance, and impaired balance. Pt. will benefit from skilled PT intervention to address these deficits. At this time, pt. requires 24/7 care and is not safe to d/c home alone.  End of Session Patient Position: Bed, 3 rail up, Alarm on  PT Plan  Inpatient/Swing Bed or Outpatient: Inpatient  PT Plan  Treatment/Interventions: Bed mobility, Transfer training, Gait training, Stair training, Balance training, Strengthening, Endurance training, Range of motion, Therapeutic exercise, Therapeutic activity, Home exercise program, Postural re-education  PT Plan: Skilled PT  PT Frequency: 3 times per week  PT Discharge Recommendations: Moderate intensity level of continued care (If pt./family refuse, would benefit from LOW intensity PT upon discharge)  Equipment Recommended upon Discharge: Wheeled walker  PT Recommended Transfer Status: Assist x1, Assistive device  PT - OK to Discharge: Yes (Eval complete, refer to dispo)      General Visit Information:   PT  Visit  PT Received On: 11/09/23  Response to Previous Treatment: Patient with no complaints from previous session.  General  Reason for Referral: s/p whipple 11/6  Past Medical History Relevant to Rehab: pancreatic head cancer, HTN, T2DM,  "hypothyroidism, GERD, Dementia, OA  Family/Caregiver Present: No  Caregiver Feedback: Son and brother in low, supportive  Prior to Session Communication: Bedside nurse  Patient Position Received: Bed, 3 rail up, Alarm off, not on at start of session  Preferred Learning Style: verbal, visual  General Comment: Pt. received supine in bed, pleasant and agreeable to participate in session.    Subjective   Precautions:  Precautions  Medical Precautions: Abdominal precautions, Fall precautions  Post-Surgical Precautions: Abdominal surgery precautions    Vital Signs:  Vital Signs  Heart Rate: 90  Heart Rate Source: Monitor  Patient Position: Lying    Objective   Pain:  Pain Assessment  Pain Assessment: 0-10  Pain Score: 7  Pain Type: Surgical pain  Pain Location: Abdomen  Pain Frequency: Constant/continuous  Pain Interventions: Medication (See MAR) (Pt. utilized PCA during session)    Cognition:  Cognition  Overall Cognitive Status: Within Functional Limits  Orientation Level: Oriented X4    Postural Control:  Postural Control  Postural Control: Within Functional Limits    Activity Tolerance:  Activity Tolerance  Endurance: Tolerates 10 - 20 min exercise with multiple rests  Early Mobility/Exercise Safety Screen: Proceed with mobilization - No exclusion criteria met  Activity Tolerance Comments: Pt. tolerated increased activity this date, reported feeling tired and fatigued stating \"I haven't gotten any sleep\"    Treatments:  Therapeutic Exercise  Therapeutic Exercise Performed: Yes  Therapeutic Exercise Activity 1: EOB: AP x20 austin, LAQ x10 austin    Therapeutic Activity  Therapeutic Activity Performed: Yes  Therapeutic Activity 1: Pt. tolerated sitting EOB ~5 min with CGAx1 to assess upright tolerance.    Bed Mobility  Bed Mobility: Yes  Bed Mobility 1  Bed Mobility 1: Supine to sitting  Level of Assistance 1: Contact guard, Minimal verbal cues  Bed Mobility Comments 1: HOB elevated. Re-educated pt. on log roll technique as " pt. attempting to long sit in bed  Bed Mobility 2  Bed Mobility  2: Sitting to supine  Level of Assistance 2: Minimum assistance, Minimal verbal cues  Bed Mobility Comments 2: Assist to elevate austin LEs onto bed    Ambulation/Gait Training  Ambulation/Gait Training Performed: Yes  Ambulation/Gait Training 1  Surface 1: Level tile  Device 1: Rolling walker  Assistance 1: Contact guard, Minimal verbal cues  Quality of Gait 1:  (decreased sheri, narrow SYDNI, mild unsteadiness, increased hesitation with change in directions)  Comments/Distance (ft) 1: 70 ft (cues for AD management and sequencing)    Transfers  Transfer: Yes  Transfer 1  Transfer From 1: Bed to  Transfer to 1: Stand  Technique 1: Sit to stand  Transfer Device 1: Walker  Transfer Level of Assistance 1: Minimum assistance, Minimal verbal cues  Trials/Comments 1: Pt. unsuccesful on initial attempt. Cues for proper hand placement and technique, pt. non-compliant and pulling on walker  Transfers 2  Transfer From 2: Stand to  Transfer to 2: Bed  Technique 2: Stand to sit  Transfer Device 2: Walker  Transfer Level of Assistance 2: Contact guard  Trials/Comments 2: Completed 2x. Cues to reach posteriorly with unilateral UE to maximize safety.  Transfers 3  Transfer From 3: Bed to  Transfer to 3: Stand  Technique 3: Sit to stand  Transfer Device 3: Walker  Transfer Level of Assistance 3: Contact guard, Minimal verbal cues  Trials/Comments 3: Additional education provided prior to transfer for proper UE placement and technique    Stairs  Stairs: No    Outcome Measures:  [facility] Basic Mobility  Turning from your back to your side while in a flat bed without using bedrails: A little  Moving from lying on your back to sitting on the side of a flat bed without using bedrails: A little  Moving to and from bed to chair (including a wheelchair): A little  Standing up from a chair using your arms (e.g. wheelchair or bedside chair): A little  To walk in hospital room: A  little  Climbing 3-5 steps with railing: A lot  Basic Mobility - Total Score: 17    Education Documentation  Precautions, taught by Dinorah Santamaria, PT at 11/9/2023 12:32 PM.  Learner: Patient  Readiness: Acceptance  Method: Explanation  Response: Verbalizes Understanding    Body Mechanics, taught by Dinorah Santamaria, PT at 11/9/2023 12:32 PM.  Learner: Patient  Readiness: Acceptance  Method: Explanation  Response: Verbalizes Understanding    Mobility Training, taught by Dinorah Santamaria, PT at 11/9/2023 12:32 PM.  Learner: Patient  Readiness: Acceptance  Method: Explanation  Response: Verbalizes Understanding    Education Comments  No comments found.        OP EDUCATION:       Encounter Problems       Encounter Problems (Active)       Balance       Patient to demo static standing with unilateral UE support, performing single UE task with CGAx1, no sway or LOB x 2 mins for functional carryover  (Progressing)       Start:  11/07/23    Expected End:  11/21/23               Mobility       Patient will ambulate >/= 50' with CGAx1 and LRAD (Progressing)       Start:  11/07/23    Expected End:  11/21/23            Pt. will tolerate >/= 8 minutes of OOB mobility without seated rest break and VSS to demo improved activity tolerance/endurance.  (Progressing)       Start:  11/07/23    Expected End:  11/21/23               Transfers       Patient will perform bed mobility Selin with HOB flat and no rails (Progressing)       Start:  11/07/23    Expected End:  11/21/23            Patient will transfer sit to and from stand with CGAx1 and LRAD (Progressing)       Start:  11/07/23    Expected End:  11/21/23

## 2023-11-10 LAB
BACTERIA FLD CULT: ABNORMAL
GLUCOSE BLD MANUAL STRIP-MCNC: 124 MG/DL (ref 74–99)
GLUCOSE BLD MANUAL STRIP-MCNC: 141 MG/DL (ref 74–99)
GLUCOSE BLD MANUAL STRIP-MCNC: 155 MG/DL (ref 74–99)
GLUCOSE BLD MANUAL STRIP-MCNC: 163 MG/DL (ref 74–99)
GLUCOSE BLD MANUAL STRIP-MCNC: 200 MG/DL (ref 74–99)
GRAM STN SPEC: ABNORMAL
GRAM STN SPEC: ABNORMAL

## 2023-11-10 PROCEDURE — 2500000004 HC RX 250 GENERAL PHARMACY W/ HCPCS (ALT 636 FOR OP/ED)

## 2023-11-10 PROCEDURE — 2500000001 HC RX 250 WO HCPCS SELF ADMINISTERED DRUGS (ALT 637 FOR MEDICARE OP): Performed by: SURGERY

## 2023-11-10 PROCEDURE — 2500000001 HC RX 250 WO HCPCS SELF ADMINISTERED DRUGS (ALT 637 FOR MEDICARE OP)

## 2023-11-10 PROCEDURE — 82947 ASSAY GLUCOSE BLOOD QUANT: CPT

## 2023-11-10 PROCEDURE — 2500000004 HC RX 250 GENERAL PHARMACY W/ HCPCS (ALT 636 FOR OP/ED): Performed by: SURGERY

## 2023-11-10 PROCEDURE — 96372 THER/PROPH/DIAG INJ SC/IM: CPT

## 2023-11-10 PROCEDURE — 1200000002 HC GENERAL ROOM WITH TELEMETRY DAILY

## 2023-11-10 RX ORDER — OXYCODONE HYDROCHLORIDE 5 MG/1
10 TABLET ORAL EVERY 4 HOURS PRN
Status: DISCONTINUED | OUTPATIENT
Start: 2023-11-10 | End: 2023-11-15 | Stop reason: HOSPADM

## 2023-11-10 RX ORDER — OXYCODONE HYDROCHLORIDE 5 MG/1
10 TABLET ORAL EVERY 4 HOURS PRN
Status: DISCONTINUED | OUTPATIENT
Start: 2023-11-10 | End: 2023-11-10

## 2023-11-10 RX ORDER — OXYCODONE HYDROCHLORIDE 5 MG/1
5 TABLET ORAL EVERY 4 HOURS PRN
Status: DISCONTINUED | OUTPATIENT
Start: 2023-11-10 | End: 2023-11-10

## 2023-11-10 RX ORDER — INSULIN LISPRO 100 [IU]/ML
0-5 INJECTION, SOLUTION INTRAVENOUS; SUBCUTANEOUS
Status: DISCONTINUED | OUTPATIENT
Start: 2023-11-10 | End: 2023-11-15 | Stop reason: HOSPADM

## 2023-11-10 RX ORDER — OXYCODONE HYDROCHLORIDE 5 MG/1
5 TABLET ORAL EVERY 4 HOURS PRN
Status: DISCONTINUED | OUTPATIENT
Start: 2023-11-10 | End: 2023-11-15 | Stop reason: HOSPADM

## 2023-11-10 RX ORDER — BISACODYL 10 MG/1
10 SUPPOSITORY RECTAL ONCE
Status: COMPLETED | OUTPATIENT
Start: 2023-11-10 | End: 2023-11-10

## 2023-11-10 RX ADMIN — Medication 5 MG: at 21:26

## 2023-11-10 RX ADMIN — DONEPEZIL HYDROCHLORIDE 10 MG: 10 TABLET, FILM COATED ORAL at 06:02

## 2023-11-10 RX ADMIN — OXYCODONE HYDROCHLORIDE 5 MG: 5 TABLET ORAL at 08:39

## 2023-11-10 RX ADMIN — ACETAMINOPHEN 650 MG: 325 TABLET ORAL at 14:28

## 2023-11-10 RX ADMIN — PAROXETINE 40 MG: 40 TABLET, FILM COATED ORAL at 06:02

## 2023-11-10 RX ADMIN — ENOXAPARIN SODIUM 40 MG: 100 INJECTION SUBCUTANEOUS at 14:29

## 2023-11-10 RX ADMIN — LATANOPROST 1 DROP: 50 SOLUTION OPHTHALMIC at 21:26

## 2023-11-10 RX ADMIN — ONDANSETRON 4 MG: 2 INJECTION INTRAMUSCULAR; INTRAVENOUS at 06:02

## 2023-11-10 RX ADMIN — Medication: at 14:30

## 2023-11-10 RX ADMIN — PANTOPRAZOLE SODIUM 40 MG: 40 TABLET, DELAYED RELEASE ORAL at 06:01

## 2023-11-10 RX ADMIN — LEVOTHYROXINE SODIUM 75 MCG: 75 TABLET ORAL at 06:01

## 2023-11-10 RX ADMIN — METHOCARBAMOL 1000 MG: 100 INJECTION INTRAMUSCULAR; INTRAVENOUS at 06:40

## 2023-11-10 RX ADMIN — AMLODIPINE BESYLATE 10 MG: 10 TABLET ORAL at 08:39

## 2023-11-10 RX ADMIN — BISACODYL 10 MG: 10 SUPPOSITORY RECTAL at 14:29

## 2023-11-10 RX ADMIN — ACETAMINOPHEN 650 MG: 325 TABLET ORAL at 06:40

## 2023-11-10 RX ADMIN — INSULIN LISPRO 1 UNITS: 100 INJECTION, SOLUTION INTRAVENOUS; SUBCUTANEOUS at 10:34

## 2023-11-10 RX ADMIN — OXYCODONE HYDROCHLORIDE 10 MG: 5 TABLET ORAL at 18:16

## 2023-11-10 RX ADMIN — INSULIN LISPRO 1 UNITS: 100 INJECTION, SOLUTION INTRAVENOUS; SUBCUTANEOUS at 18:07

## 2023-11-10 RX ADMIN — ACETAMINOPHEN 650 MG: 325 TABLET ORAL at 20:14

## 2023-11-10 RX ADMIN — ATORVASTATIN CALCIUM 80 MG: 80 TABLET, FILM COATED ORAL at 21:26

## 2023-11-10 RX ADMIN — OXYCODONE HYDROCHLORIDE 10 MG: 5 TABLET ORAL at 14:28

## 2023-11-10 ASSESSMENT — PAIN - FUNCTIONAL ASSESSMENT
PAIN_FUNCTIONAL_ASSESSMENT: 0-10
PAIN_FUNCTIONAL_ASSESSMENT: 0-10

## 2023-11-10 ASSESSMENT — COGNITIVE AND FUNCTIONAL STATUS - GENERAL
DRESSING REGULAR UPPER BODY CLOTHING: A LITTLE
CLIMB 3 TO 5 STEPS WITH RAILING: A LITTLE
MOBILITY SCORE: 23
DAILY ACTIVITIY SCORE: 21
HELP NEEDED FOR BATHING: A LITTLE
DRESSING REGULAR LOWER BODY CLOTHING: A LITTLE

## 2023-11-10 ASSESSMENT — PAIN SCALES - GENERAL
PAINLEVEL_OUTOF10: 4
PAINLEVEL_OUTOF10: 7
PAINLEVEL_OUTOF10: 7
PAINLEVEL_OUTOF10: 0 - NO PAIN
PAINLEVEL_OUTOF10: 8
PAINLEVEL_OUTOF10: 7

## 2023-11-10 ASSESSMENT — PAIN SCALES - WONG BAKER: WONGBAKER_NUMERICALRESPONSE: NO HURT

## 2023-11-10 NOTE — PROGRESS NOTES
"Laura No is a 68 y.o. female on day 4 of admission presenting with Pancreatic mass.    Subjective   No acute events overnight. Patient tolerating clear liquid diet.     Objective   Constitutional: NAD, resting comfortably in bed  Respiratory: unlabored breathing  Cardiovascular: RR, normotensive  Abdomen: soft, appropriately tender to palpation around incision site, non-distended, midline incision with staples, c/d/I, LLQ and RLQ drains to gravity, with some serosanguineous output  : no orozco   MSK: Normal ROM     Last Recorded Vitals  Blood pressure 148/63, pulse 71, temperature 36 °C (96.8 °F), temperature source Temporal, resp. rate 18, height 1.55 m (5' 1.02\"), weight 71.6 kg (157 lb 13.6 oz), SpO2 95 %.  Intake/Output last 3 Shifts:  I/O last 3 completed shifts:  In: 1056.7 (14.8 mL/kg) [I.V.:50 (0.7 mL/kg); IV Piggyback:1006.7]  Out: 3158 (44.1 mL/kg) [Urine:3000 (1.2 mL/kg/hr); Drains:158]  Weight: 71.6 kg     Relevant Results  Results for orders placed or performed during the hospital encounter of 11/06/23 (from the past 24 hour(s))   Amylase, Fluid   Result Value Ref Range    Amylase, Fluid 17 Not established. U/L   Amylase, Fluid   Result Value Ref Range    Amylase, Fluid 11 Not established. U/L   POCT GLUCOSE   Result Value Ref Range    POCT Glucose 147 (H) 74 - 99 mg/dL   POCT GLUCOSE   Result Value Ref Range    POCT Glucose 155 (H) 74 - 99 mg/dL   POCT GLUCOSE   Result Value Ref Range    POCT Glucose 124 (H) 74 - 99 mg/dL   POCT GLUCOSE   Result Value Ref Range    POCT Glucose 200 (H) 74 - 99 mg/dL     Scheduled medications  acetaminophen, 650 mg, oral, q6h  amLODIPine, 10 mg, oral, Daily  atorvastatin, 80 mg, oral, Nightly  bisacodyl, 10 mg, rectal, Once  donepezil, 10 mg, oral, Daily before breakfast  enoxaparin, 40 mg, subcutaneous, q24h  insulin lispro, 0-5 Units, subcutaneous, q4h while awake  latanoprost, 1 drop, Both Eyes, Nightly  levothyroxine, 75 mcg, oral, Daily  [Held by provider] " lisinopril, 20 mg, oral, Daily  melatonin, 5 mg, oral, Nightly  [Held by provider] pancrelipase (Lip-Prot-Amyl), 2 capsule, oral, TID with meals  pantoprazole, 40 mg, oral, Daily before breakfast  PARoxetine, 40 mg, oral, Daily before breakfast  surgical lubricant, , ,       Continuous medications       PRN medications  PRN medications: benzocaine-menthol, dextrose 10 % in water (D10W), dextrose, glucagon, ondansetron ODT **OR** ondansetron, oxyCODONE, oxyCODONE, phenoL, promethazine **OR** promethazine, surgical lubricant      Assessment/Plan   Principal Problem:    Pancreatic mass    Patient is a 67 yo F who is POD4 s/p whipple for unspecified pancreatic mass. She is recovering as expected on the regular nursing floor.     Plan:   - HLIVF  - Advance to FLD   - Suppository today  - Remove right REINIER drain. Leave left REINIER drain today.   - DC PCA, transition to oral pain control: Tylenol, oxycodone PRN   - PT/OT, ambulate as tolerated  - SSI. Follow up insulin requirements for home-going plan.   - Oral protonix  - Continue appropriate home medications (paroxetine, donepezil, melatonin, statin, levothyroxine. Started amlodipine. Continue holding lisinopril, Creon, metformin)  - Lovenox   - SCD's   - Dispo: will plan on SNF early next week        Frances Padilla MD  [facility] [phone]

## 2023-11-10 NOTE — PROGRESS NOTES
SW updated the patient that [facility] has accepted her and team shared she would be ready for discharge early next week. Patient agreeable to providing updates to her family and OLGA spoke with her daughter,Maria Luz, and she was very happy since the facility is close to her home. Pt.'s daughter shared that her mother does have a HHA through [employer]. OLGA made the team aware the we need the Gold Form signed by the Attending to obtain the LOC for transfer to facility. Will continue to follow.

## 2023-11-11 LAB
GLUCOSE BLD MANUAL STRIP-MCNC: 150 MG/DL (ref 74–99)
GLUCOSE BLD MANUAL STRIP-MCNC: 191 MG/DL (ref 74–99)
GLUCOSE BLD MANUAL STRIP-MCNC: 197 MG/DL (ref 74–99)
GLUCOSE BLD MANUAL STRIP-MCNC: 254 MG/DL (ref 74–99)

## 2023-11-11 PROCEDURE — 82947 ASSAY GLUCOSE BLOOD QUANT: CPT

## 2023-11-11 PROCEDURE — 1170000001 HC PRIVATE ONCOLOGY ROOM DAILY

## 2023-11-11 PROCEDURE — 2500000005 HC RX 250 GENERAL PHARMACY W/O HCPCS: Performed by: SURGERY

## 2023-11-11 PROCEDURE — 2500000004 HC RX 250 GENERAL PHARMACY W/ HCPCS (ALT 636 FOR OP/ED): Performed by: SURGERY

## 2023-11-11 PROCEDURE — 2500000001 HC RX 250 WO HCPCS SELF ADMINISTERED DRUGS (ALT 637 FOR MEDICARE OP)

## 2023-11-11 PROCEDURE — 81001 URINALYSIS AUTO W/SCOPE: CPT

## 2023-11-11 PROCEDURE — 2500000001 HC RX 250 WO HCPCS SELF ADMINISTERED DRUGS (ALT 637 FOR MEDICARE OP): Performed by: SURGERY

## 2023-11-11 PROCEDURE — 96372 THER/PROPH/DIAG INJ SC/IM: CPT

## 2023-11-11 PROCEDURE — 87186 SC STD MICRODIL/AGAR DIL: CPT

## 2023-11-11 PROCEDURE — 2500000004 HC RX 250 GENERAL PHARMACY W/ HCPCS (ALT 636 FOR OP/ED)

## 2023-11-11 RX ORDER — DOCUSATE SODIUM 100 MG/1
100 CAPSULE, LIQUID FILLED ORAL 2 TIMES DAILY
Status: DISCONTINUED | OUTPATIENT
Start: 2023-11-11 | End: 2023-11-15 | Stop reason: HOSPADM

## 2023-11-11 RX ORDER — ADHESIVE BANDAGE
30 BANDAGE TOPICAL ONCE
Status: COMPLETED | OUTPATIENT
Start: 2023-11-11 | End: 2023-11-11

## 2023-11-11 RX ADMIN — OXYCODONE HYDROCHLORIDE 10 MG: 5 TABLET ORAL at 10:10

## 2023-11-11 RX ADMIN — MAGNESIUM HYDROXIDE 30 ML: 400 SUSPENSION ORAL at 16:27

## 2023-11-11 RX ADMIN — DONEPEZIL HYDROCHLORIDE 10 MG: 10 TABLET, FILM COATED ORAL at 06:00

## 2023-11-11 RX ADMIN — DOCUSATE SODIUM 100 MG: 100 CAPSULE, LIQUID FILLED ORAL at 21:43

## 2023-11-11 RX ADMIN — Medication 5 MG: at 21:43

## 2023-11-11 RX ADMIN — ENOXAPARIN SODIUM 40 MG: 100 INJECTION SUBCUTANEOUS at 16:27

## 2023-11-11 RX ADMIN — LATANOPROST 1 DROP: 50 SOLUTION OPHTHALMIC at 21:43

## 2023-11-11 RX ADMIN — ACETAMINOPHEN 650 MG: 325 TABLET ORAL at 20:01

## 2023-11-11 RX ADMIN — AMLODIPINE BESYLATE 10 MG: 10 TABLET ORAL at 08:15

## 2023-11-11 RX ADMIN — INSULIN LISPRO 3 UNITS: 100 INJECTION, SOLUTION INTRAVENOUS; SUBCUTANEOUS at 21:41

## 2023-11-11 RX ADMIN — PAROXETINE 40 MG: 40 TABLET, FILM COATED ORAL at 06:00

## 2023-11-11 RX ADMIN — ATORVASTATIN CALCIUM 80 MG: 80 TABLET, FILM COATED ORAL at 21:43

## 2023-11-11 RX ADMIN — PANTOPRAZOLE SODIUM 40 MG: 40 TABLET, DELAYED RELEASE ORAL at 06:00

## 2023-11-11 RX ADMIN — ACETAMINOPHEN 650 MG: 325 TABLET ORAL at 01:10

## 2023-11-11 RX ADMIN — OXYCODONE HYDROCHLORIDE 10 MG: 5 TABLET ORAL at 20:01

## 2023-11-11 RX ADMIN — DOCUSATE SODIUM 100 MG: 100 CAPSULE, LIQUID FILLED ORAL at 08:15

## 2023-11-11 RX ADMIN — ONDANSETRON 4 MG: 4 TABLET, ORALLY DISINTEGRATING ORAL at 10:10

## 2023-11-11 RX ADMIN — ACETAMINOPHEN 650 MG: 325 TABLET ORAL at 06:29

## 2023-11-11 RX ADMIN — LEVOTHYROXINE SODIUM 75 MCG: 75 TABLET ORAL at 06:00

## 2023-11-11 RX ADMIN — OXYCODONE HYDROCHLORIDE 10 MG: 5 TABLET ORAL at 01:10

## 2023-11-11 ASSESSMENT — PAIN - FUNCTIONAL ASSESSMENT
PAIN_FUNCTIONAL_ASSESSMENT: 0-10

## 2023-11-11 ASSESSMENT — PAIN SCALES - GENERAL
PAINLEVEL_OUTOF10: 4
PAINLEVEL_OUTOF10: 8

## 2023-11-11 NOTE — CARE PLAN
Problem: Pain  Goal: Takes deep breaths with improved pain control throughout the shift  Outcome: Progressing  Goal: Turns in bed with improved pain control throughout the shift  Outcome: Progressing  Goal: Walks with improved pain control throughout the shift  Outcome: Progressing  Goal: Performs ADL's with improved pain control throughout shift  Outcome: Progressing  Goal: Participates in PT with improved pain control throughout the shift  Outcome: Progressing  Goal: Free from opioid side effects throughout the shift  Outcome: Progressing  Goal: Free from acute confusion related to pain meds throughout the shift  Outcome: Progressing   The patient's goals for the shift include      The clinical goals for the shift include patient will be safe and pain free throughout the shift.

## 2023-11-11 NOTE — PROGRESS NOTES
"Laura No is a 68 y.o. female on day 5 of admission presenting with Pancreatic mass.    Subjective   No acute events overnight. Patient tolerating clear liquid diet. Was able to get out of bed into chair. Passing some gas, has not yet had a bowel movement. Denies fevers, chills, n/v. Voiding adequately.     Objective   Constitutional: NAD, resting comfortably in bed  Respiratory: unlabored breathing  Cardiovascular: RR, normotensive  Abdomen: soft, appropriately tender to palpation around incision site, non-distended, midline incision with staples, c/d/I, LLQ drain removed today   : no orozco   MSK: Normal ROM     Last Recorded Vitals  Blood pressure 165/84, pulse 70, temperature 36.4 °C (97.5 °F), temperature source Temporal, resp. rate 18, height 1.55 m (5' 1.02\"), weight 71.6 kg (157 lb 13.6 oz), SpO2 100 %.  Intake/Output last 3 Shifts:  I/O last 3 completed shifts:  In: 60 (0.8 mL/kg) [P.O.:60]  Out: 2910 (40.6 mL/kg) [Urine:2750 (1.1 mL/kg/hr); Drains:160]  Weight: 71.6 kg     Relevant Results  Results for orders placed or performed during the hospital encounter of 11/06/23 (from the past 24 hour(s))   POCT GLUCOSE   Result Value Ref Range    POCT Glucose 163 (H) 74 - 99 mg/dL   POCT GLUCOSE   Result Value Ref Range    POCT Glucose 141 (H) 74 - 99 mg/dL   POCT GLUCOSE   Result Value Ref Range    POCT Glucose 150 (H) 74 - 99 mg/dL     Scheduled medications  acetaminophen, 650 mg, oral, q6h  amLODIPine, 10 mg, oral, Daily  atorvastatin, 80 mg, oral, Nightly  docusate sodium, 100 mg, oral, BID  donepezil, 10 mg, oral, Daily before breakfast  enoxaparin, 40 mg, subcutaneous, q24h  insulin lispro, 0-5 Units, subcutaneous, Before meals & nightly  latanoprost, 1 drop, Both Eyes, Nightly  levothyroxine, 75 mcg, oral, Daily  [Held by provider] lisinopril, 20 mg, oral, Daily  magnesium hydroxide, 30 mL, oral, Once  melatonin, 5 mg, oral, Nightly  pantoprazole, 40 mg, oral, Daily before breakfast  PARoxetine, 40 mg, " oral, Daily before breakfast      Continuous medications       PRN medications  PRN medications: benzocaine-menthol, dextrose 10 % in water (D10W), dextrose, glucagon, ondansetron ODT **OR** ondansetron, oxyCODONE, oxyCODONE, phenoL, promethazine **OR** promethazine      Assessment/Plan   Principal Problem:    Pancreatic mass    Patient is a 67 yo F who is POD5 s/p whipple for unspecified pancreatic mass. She is recovering as expected on the regular nursing floor. RLQ drain was pulled yesterday; LLQ drain pulled today. Pending acceptance to SNF.      Plan:   - HLIVF  - dc telemetry  - Advance to easy chew diet   - milk of mag one dose  - colace BID   - DC PCA, transition to oral pain control: Tylenol, oxycodone PRN   - PT/OT, ambulate as tolerated  - SSI. Follow up insulin requirements for home-going plan.   - Oral protonix  - Continue appropriate home medications (paroxetine, donepezil, melatonin, statin, levothyroxine. Started amlodipine. Continue holding lisinopril, Creon, metformin)  - Lovenox   - SCD's   - Dispo: will plan on SNF early next week     Case was discussed with attending surgeon Dr. Dia.      MD Souleymane Hernandez [phone]

## 2023-11-12 LAB
APPEARANCE UR: ABNORMAL
BACTERIA #/AREA URNS AUTO: ABNORMAL /HPF
BILIRUB UR STRIP.AUTO-MCNC: NEGATIVE MG/DL
COLOR UR: YELLOW
ERYTHROCYTE [DISTWIDTH] IN BLOOD BY AUTOMATED COUNT: 14.8 % (ref 11.5–14.5)
GLUCOSE BLD MANUAL STRIP-MCNC: 183 MG/DL (ref 74–99)
GLUCOSE BLD MANUAL STRIP-MCNC: 192 MG/DL (ref 74–99)
GLUCOSE BLD MANUAL STRIP-MCNC: 196 MG/DL (ref 74–99)
GLUCOSE BLD MANUAL STRIP-MCNC: 233 MG/DL (ref 74–99)
GLUCOSE UR STRIP.AUTO-MCNC: NEGATIVE MG/DL
HCT VFR BLD AUTO: 24.9 % (ref 36–46)
HGB BLD-MCNC: 8.1 G/DL (ref 12–16)
HOLD SPECIMEN: NORMAL
KETONES UR STRIP.AUTO-MCNC: NEGATIVE MG/DL
LEUKOCYTE ESTERASE UR QL STRIP.AUTO: ABNORMAL
MCH RBC QN AUTO: 30.5 PG (ref 26–34)
MCHC RBC AUTO-ENTMCNC: 32.5 G/DL (ref 32–36)
MCV RBC AUTO: 94 FL (ref 80–100)
MUCOUS THREADS #/AREA URNS AUTO: ABNORMAL /LPF
NITRITE UR QL STRIP.AUTO: NEGATIVE
NRBC BLD-RTO: 0 /100 WBCS (ref 0–0)
PH UR STRIP.AUTO: 6 [PH]
PLATELET # BLD AUTO: 363 X10*3/UL (ref 150–450)
PROT UR STRIP.AUTO-MCNC: ABNORMAL MG/DL
RBC # BLD AUTO: 2.66 X10*6/UL (ref 4–5.2)
RBC # UR STRIP.AUTO: NEGATIVE /UL
RBC #/AREA URNS AUTO: ABNORMAL /HPF
SP GR UR STRIP.AUTO: 1.02
SQUAMOUS #/AREA URNS AUTO: ABNORMAL /HPF
UROBILINOGEN UR STRIP.AUTO-MCNC: 2 MG/DL
WBC # BLD AUTO: 9.7 X10*3/UL (ref 4.4–11.3)
WBC #/AREA URNS AUTO: >50 /HPF
WBC CLUMPS #/AREA URNS AUTO: ABNORMAL /HPF

## 2023-11-12 PROCEDURE — 2500000001 HC RX 250 WO HCPCS SELF ADMINISTERED DRUGS (ALT 637 FOR MEDICARE OP)

## 2023-11-12 PROCEDURE — 2500000004 HC RX 250 GENERAL PHARMACY W/ HCPCS (ALT 636 FOR OP/ED): Performed by: SURGERY

## 2023-11-12 PROCEDURE — 96372 THER/PROPH/DIAG INJ SC/IM: CPT

## 2023-11-12 PROCEDURE — 2500000005 HC RX 250 GENERAL PHARMACY W/O HCPCS: Performed by: SURGERY

## 2023-11-12 PROCEDURE — 2500000001 HC RX 250 WO HCPCS SELF ADMINISTERED DRUGS (ALT 637 FOR MEDICARE OP): Performed by: SURGERY

## 2023-11-12 PROCEDURE — 2500000004 HC RX 250 GENERAL PHARMACY W/ HCPCS (ALT 636 FOR OP/ED)

## 2023-11-12 PROCEDURE — 82947 ASSAY GLUCOSE BLOOD QUANT: CPT

## 2023-11-12 PROCEDURE — 85027 COMPLETE CBC AUTOMATED: CPT

## 2023-11-12 PROCEDURE — 36415 COLL VENOUS BLD VENIPUNCTURE: CPT | Performed by: STUDENT IN AN ORGANIZED HEALTH CARE EDUCATION/TRAINING PROGRAM

## 2023-11-12 PROCEDURE — 1170000001 HC PRIVATE ONCOLOGY ROOM DAILY

## 2023-11-12 RX ORDER — SULFAMETHOXAZOLE AND TRIMETHOPRIM 800; 160 MG/1; MG/1
160 TABLET ORAL 2 TIMES DAILY
Status: DISCONTINUED | OUTPATIENT
Start: 2023-11-12 | End: 2023-11-15 | Stop reason: HOSPADM

## 2023-11-12 RX ORDER — CLONAZEPAM 0.5 MG/1
0.5 TABLET ORAL 2 TIMES DAILY
Status: DISCONTINUED | OUTPATIENT
Start: 2023-11-12 | End: 2023-11-15 | Stop reason: HOSPADM

## 2023-11-12 RX ORDER — SULFAMETHOXAZOLE AND TRIMETHOPRIM 800; 160 MG/1; MG/1
160 TABLET ORAL ONCE
Status: DISCONTINUED | OUTPATIENT
Start: 2023-11-12 | End: 2023-11-12

## 2023-11-12 RX ADMIN — CLONAZEPAM 0.5 MG: 0.5 TABLET ORAL at 20:55

## 2023-11-12 RX ADMIN — SULFAMETHOXAZOLE AND TRIMETHOPRIM 160 MG: 800; 160 TABLET ORAL at 20:55

## 2023-11-12 RX ADMIN — PANTOPRAZOLE SODIUM 40 MG: 40 TABLET, DELAYED RELEASE ORAL at 09:07

## 2023-11-12 RX ADMIN — OXYCODONE HYDROCHLORIDE 10 MG: 5 TABLET ORAL at 13:32

## 2023-11-12 RX ADMIN — ACETAMINOPHEN 650 MG: 325 TABLET ORAL at 15:37

## 2023-11-12 RX ADMIN — PAROXETINE 40 MG: 40 TABLET, FILM COATED ORAL at 09:11

## 2023-11-12 RX ADMIN — OXYCODONE HYDROCHLORIDE 10 MG: 5 TABLET ORAL at 18:45

## 2023-11-12 RX ADMIN — INSULIN LISPRO 1 UNITS: 100 INJECTION, SOLUTION INTRAVENOUS; SUBCUTANEOUS at 13:24

## 2023-11-12 RX ADMIN — Medication 5 MG: at 20:55

## 2023-11-12 RX ADMIN — OXYCODONE HYDROCHLORIDE 5 MG: 5 TABLET ORAL at 09:09

## 2023-11-12 RX ADMIN — ATORVASTATIN CALCIUM 80 MG: 80 TABLET, FILM COATED ORAL at 20:55

## 2023-11-12 RX ADMIN — INSULIN LISPRO 1 UNITS: 100 INJECTION, SOLUTION INTRAVENOUS; SUBCUTANEOUS at 09:11

## 2023-11-12 RX ADMIN — DOCUSATE SODIUM 100 MG: 100 CAPSULE, LIQUID FILLED ORAL at 20:55

## 2023-11-12 RX ADMIN — DONEPEZIL HYDROCHLORIDE 10 MG: 10 TABLET, FILM COATED ORAL at 09:10

## 2023-11-12 RX ADMIN — ENOXAPARIN SODIUM 40 MG: 100 INJECTION SUBCUTANEOUS at 15:37

## 2023-11-12 RX ADMIN — SULFAMETHOXAZOLE AND TRIMETHOPRIM 160 MG: 800; 160 TABLET ORAL at 10:38

## 2023-11-12 RX ADMIN — CLONAZEPAM 0.5 MG: 0.5 TABLET ORAL at 10:17

## 2023-11-12 RX ADMIN — ACETAMINOPHEN 650 MG: 325 TABLET ORAL at 09:07

## 2023-11-12 RX ADMIN — INSULIN LISPRO 1 UNITS: 100 INJECTION, SOLUTION INTRAVENOUS; SUBCUTANEOUS at 18:46

## 2023-11-12 RX ADMIN — DOCUSATE SODIUM 100 MG: 100 CAPSULE, LIQUID FILLED ORAL at 09:10

## 2023-11-12 RX ADMIN — ACETAMINOPHEN 650 MG: 325 TABLET ORAL at 01:48

## 2023-11-12 RX ADMIN — INSULIN LISPRO 2 UNITS: 100 INJECTION, SOLUTION INTRAVENOUS; SUBCUTANEOUS at 22:11

## 2023-11-12 RX ADMIN — ACETAMINOPHEN 650 MG: 325 TABLET ORAL at 21:16

## 2023-11-12 RX ADMIN — LATANOPROST 1 DROP: 50 SOLUTION OPHTHALMIC at 20:54

## 2023-11-12 RX ADMIN — AMLODIPINE BESYLATE 10 MG: 10 TABLET ORAL at 09:06

## 2023-11-12 RX ADMIN — ONDANSETRON 4 MG: 4 TABLET, ORALLY DISINTEGRATING ORAL at 06:17

## 2023-11-12 RX ADMIN — LEVOTHYROXINE SODIUM 75 MCG: 75 TABLET ORAL at 06:17

## 2023-11-12 ASSESSMENT — PAIN DESCRIPTION - LOCATION
LOCATION: ABDOMEN

## 2023-11-12 ASSESSMENT — PAIN - FUNCTIONAL ASSESSMENT
PAIN_FUNCTIONAL_ASSESSMENT: 0-10

## 2023-11-12 ASSESSMENT — PAIN SCALES - GENERAL
PAINLEVEL_OUTOF10: 4
PAINLEVEL_OUTOF10: 4
PAINLEVEL_OUTOF10: 7
PAINLEVEL_OUTOF10: 7
PAINLEVEL_OUTOF10: 5 - MODERATE PAIN
PAINLEVEL_OUTOF10: 7
PAINLEVEL_OUTOF10: 0 - NO PAIN
PAINLEVEL_OUTOF10: 7

## 2023-11-12 ASSESSMENT — COGNITIVE AND FUNCTIONAL STATUS - GENERAL
DAILY ACTIVITIY SCORE: 23
HELP NEEDED FOR BATHING: A LITTLE
MOBILITY SCORE: 24

## 2023-11-12 ASSESSMENT — PAIN DESCRIPTION - ORIENTATION
ORIENTATION: LOWER

## 2023-11-12 NOTE — PROGRESS NOTES
"Laura No is a 68 y.o. female on day 6 of admission presenting with Pancreatic mass.    Subjective   Patient was complaining about dysuria overnight. UA was ordered, pending culture. Had a bowel movement overnight. Tolerated easy chew diet. Denies f/c/n/v. Was able to get to chair and ambulate yesterday.     Objective   Constitutional: NAD, resting comfortably in bed  Respiratory: unlabored breathing  Cardiovascular: RR, normotensive  Abdomen: soft, appropriately tender to palpation around incision site, non-distended, midline incision with staples  : no orozco   MSK: Normal ROM     Last Recorded Vitals  Blood pressure 165/84, pulse 79, temperature 36.2 °C (97.2 °F), temperature source Temporal, resp. rate 16, height 1.55 m (5' 1.02\"), weight 71.6 kg (157 lb 13.6 oz), SpO2 96 %.  Intake/Output last 3 Shifts:  I/O last 3 completed shifts:  In: 560 (7.8 mL/kg) [P.O.:560]  Out: 1545 (21.6 mL/kg) [Urine:1490 (0.6 mL/kg/hr); Drains:55]  Weight: 71.6 kg     Relevant Results  Results for orders placed or performed during the hospital encounter of 11/06/23 (from the past 24 hour(s))   POCT GLUCOSE   Result Value Ref Range    POCT Glucose 191 (H) 74 - 99 mg/dL   POCT GLUCOSE   Result Value Ref Range    POCT Glucose 197 (H) 74 - 99 mg/dL   POCT GLUCOSE   Result Value Ref Range    POCT Glucose 254 (H) 74 - 99 mg/dL   Urinalysis with Reflex Microscopic and Culture   Result Value Ref Range    Color, Urine Yellow Straw, Yellow    Appearance, Urine Hazy (N) Clear    Specific Gravity, Urine 1.016 1.005 - 1.035    pH, Urine 6.0 5.0, 5.5, 6.0, 6.5, 7.0, 7.5, 8.0    Protein, Urine 30 (1+) (N) NEGATIVE mg/dL    Glucose, Urine NEGATIVE NEGATIVE mg/dL    Blood, Urine NEGATIVE NEGATIVE    Ketones, Urine NEGATIVE NEGATIVE mg/dL    Bilirubin, Urine NEGATIVE NEGATIVE    Urobilinogen, Urine 2.0 (N) <2.0 mg/dL    Nitrite, Urine NEGATIVE NEGATIVE    Leukocyte Esterase, Urine LARGE (3+) (A) NEGATIVE   Extra Urine Gray Tube   Result Value " Ref Range    Extra Tube Hold for add-ons.    Microscopic Only, Urine   Result Value Ref Range    WBC, Urine >50 (A) 1-5, NONE /HPF    WBC Clumps, Urine OCCASIONAL Reference range not established. /HPF    RBC, Urine 11-20 (A) NONE, 1-2, 3-5 /HPF    Squamous Epithelial Cells, Urine 1-9 (SPARSE) Reference range not established. /HPF    Bacteria, Urine 3+ (A) NONE SEEN /HPF    Mucus, Urine 1+ Reference range not established. /LPF   POCT GLUCOSE   Result Value Ref Range    POCT Glucose 183 (H) 74 - 99 mg/dL     Scheduled medications  acetaminophen, 650 mg, oral, q6h  amLODIPine, 10 mg, oral, Daily  atorvastatin, 80 mg, oral, Nightly  docusate sodium, 100 mg, oral, BID  donepezil, 10 mg, oral, Daily before breakfast  enoxaparin, 40 mg, subcutaneous, q24h  insulin lispro, 0-5 Units, subcutaneous, Before meals & nightly  latanoprost, 1 drop, Both Eyes, Nightly  levothyroxine, 75 mcg, oral, Daily  [Held by provider] lisinopril, 20 mg, oral, Daily  melatonin, 5 mg, oral, Nightly  pantoprazole, 40 mg, oral, Daily before breakfast  PARoxetine, 40 mg, oral, Daily before breakfast  sulfamethoxazole-trimethoprim, 160 mg, oral, Once      Continuous medications     PRN medications  PRN medications: benzocaine-menthol, dextrose 10 % in water (D10W), dextrose, glucagon, ondansetron ODT **OR** ondansetron, oxyCODONE, oxyCODONE, phenoL, promethazine **OR** promethazine      Assessment/Plan   Principal Problem:    Pancreatic mass    Patient is a 67 yo F who is POD6 s/p whipple for unspecified pancreatic mass. She is recovering as expected on the regular nursing floor. Had a bowel movement overnight. Tolerating easy chew diet. Patient had complaints of dysuria overnight; UA was collected, pending culture. Started on bactrim today. Pending pre-cert for SNF placement.      Plan:   - HLIVF  - started on bactrim today for dysuria, some leuk esterase and WBC seen on UA   - Advance to easy chew diet   - colace BID   - DC PCA, transition to oral  pain control: Tylenol, oxycodone PRN   - PT/OT, ambulate as tolerated  - SSI. Follow up insulin requirements for home-going plan.   - Oral protonix  - Continue appropriate home medications (paroxetine, donepezil, melatonin, statin, levothyroxine. Started amlodipine. Continue holding lisinopril, Creon, metformin)  - Lovenox   - SCD's   - Dispo: will plan on SNF early next week     Case was discussed with attending surgeon Dr. Dia.      MD Souleymane Hernandez [phone]

## 2023-11-12 NOTE — CARE PLAN
The patient's goals for the shift include      The clinical goals for the shift include Pt will remain free from fall and injury, good pain management

## 2023-11-13 LAB
GLUCOSE BLD MANUAL STRIP-MCNC: 198 MG/DL (ref 74–99)
GLUCOSE BLD MANUAL STRIP-MCNC: 204 MG/DL (ref 74–99)
GLUCOSE BLD MANUAL STRIP-MCNC: 211 MG/DL (ref 74–99)
GLUCOSE BLD MANUAL STRIP-MCNC: 220 MG/DL (ref 74–99)

## 2023-11-13 PROCEDURE — 1170000001 HC PRIVATE ONCOLOGY ROOM DAILY

## 2023-11-13 PROCEDURE — 2500000005 HC RX 250 GENERAL PHARMACY W/O HCPCS: Performed by: STUDENT IN AN ORGANIZED HEALTH CARE EDUCATION/TRAINING PROGRAM

## 2023-11-13 PROCEDURE — 2500000001 HC RX 250 WO HCPCS SELF ADMINISTERED DRUGS (ALT 637 FOR MEDICARE OP)

## 2023-11-13 PROCEDURE — 2500000004 HC RX 250 GENERAL PHARMACY W/ HCPCS (ALT 636 FOR OP/ED)

## 2023-11-13 PROCEDURE — 2500000004 HC RX 250 GENERAL PHARMACY W/ HCPCS (ALT 636 FOR OP/ED): Performed by: SURGERY

## 2023-11-13 PROCEDURE — 96372 THER/PROPH/DIAG INJ SC/IM: CPT

## 2023-11-13 PROCEDURE — 2500000001 HC RX 250 WO HCPCS SELF ADMINISTERED DRUGS (ALT 637 FOR MEDICARE OP): Performed by: STUDENT IN AN ORGANIZED HEALTH CARE EDUCATION/TRAINING PROGRAM

## 2023-11-13 PROCEDURE — 2500000001 HC RX 250 WO HCPCS SELF ADMINISTERED DRUGS (ALT 637 FOR MEDICARE OP): Performed by: SURGERY

## 2023-11-13 PROCEDURE — 82947 ASSAY GLUCOSE BLOOD QUANT: CPT

## 2023-11-13 RX ORDER — FLUCONAZOLE 150 MG/1
150 TABLET ORAL ONCE
Status: COMPLETED | OUTPATIENT
Start: 2023-11-13 | End: 2023-11-13

## 2023-11-13 RX ORDER — ADHESIVE BANDAGE
30 BANDAGE TOPICAL ONCE
Status: COMPLETED | OUTPATIENT
Start: 2023-11-13 | End: 2023-11-13

## 2023-11-13 RX ORDER — PANTOPRAZOLE SODIUM 40 MG/1
40 TABLET, DELAYED RELEASE ORAL
Status: DISCONTINUED | OUTPATIENT
Start: 2023-11-13 | End: 2023-11-13

## 2023-11-13 RX ORDER — LIDOCAINE 560 MG/1
1 PATCH PERCUTANEOUS; TOPICAL; TRANSDERMAL DAILY
Status: DISCONTINUED | OUTPATIENT
Start: 2023-11-13 | End: 2023-11-15 | Stop reason: HOSPADM

## 2023-11-13 RX ORDER — LIDOCAINE 50 MG/G
1 PATCH TOPICAL DAILY
Status: DISCONTINUED | OUTPATIENT
Start: 2023-11-13 | End: 2023-11-13

## 2023-11-13 RX ADMIN — LIDOCAINE 1 PATCH: 4 PATCH TOPICAL at 20:56

## 2023-11-13 RX ADMIN — ENOXAPARIN SODIUM 40 MG: 100 INJECTION SUBCUTANEOUS at 16:33

## 2023-11-13 RX ADMIN — DONEPEZIL HYDROCHLORIDE 10 MG: 10 TABLET, FILM COATED ORAL at 08:43

## 2023-11-13 RX ADMIN — ACETAMINOPHEN 650 MG: 325 TABLET ORAL at 20:54

## 2023-11-13 RX ADMIN — LATANOPROST 1 DROP: 50 SOLUTION OPHTHALMIC at 21:14

## 2023-11-13 RX ADMIN — MAGNESIUM HYDROXIDE 30 ML: 400 SUSPENSION ORAL at 12:30

## 2023-11-13 RX ADMIN — ACETAMINOPHEN 650 MG: 325 TABLET ORAL at 16:24

## 2023-11-13 RX ADMIN — INSULIN LISPRO 2 UNITS: 100 INJECTION, SOLUTION INTRAVENOUS; SUBCUTANEOUS at 20:52

## 2023-11-13 RX ADMIN — SULFAMETHOXAZOLE AND TRIMETHOPRIM 160 MG: 800; 160 TABLET ORAL at 20:55

## 2023-11-13 RX ADMIN — SULFAMETHOXAZOLE AND TRIMETHOPRIM 160 MG: 800; 160 TABLET ORAL at 08:42

## 2023-11-13 RX ADMIN — CLONAZEPAM 0.5 MG: 0.5 TABLET ORAL at 08:42

## 2023-11-13 RX ADMIN — DOCUSATE SODIUM 100 MG: 100 CAPSULE, LIQUID FILLED ORAL at 20:54

## 2023-11-13 RX ADMIN — INSULIN LISPRO 1 UNITS: 100 INJECTION, SOLUTION INTRAVENOUS; SUBCUTANEOUS at 12:32

## 2023-11-13 RX ADMIN — INSULIN LISPRO 2 UNITS: 100 INJECTION, SOLUTION INTRAVENOUS; SUBCUTANEOUS at 08:46

## 2023-11-13 RX ADMIN — OXYCODONE HYDROCHLORIDE 10 MG: 5 TABLET ORAL at 05:53

## 2023-11-13 RX ADMIN — AMLODIPINE BESYLATE 10 MG: 10 TABLET ORAL at 08:42

## 2023-11-13 RX ADMIN — PANTOPRAZOLE SODIUM 40 MG: 40 TABLET, DELAYED RELEASE ORAL at 08:42

## 2023-11-13 RX ADMIN — LEVOTHYROXINE SODIUM 75 MCG: 75 TABLET ORAL at 05:53

## 2023-11-13 RX ADMIN — FLUCONAZOLE 150 MG: 150 TABLET ORAL at 12:30

## 2023-11-13 RX ADMIN — DOCUSATE SODIUM 100 MG: 100 CAPSULE, LIQUID FILLED ORAL at 08:42

## 2023-11-13 RX ADMIN — INSULIN LISPRO 3 UNITS: 100 INJECTION, SOLUTION INTRAVENOUS; SUBCUTANEOUS at 16:50

## 2023-11-13 RX ADMIN — ATORVASTATIN CALCIUM 80 MG: 80 TABLET, FILM COATED ORAL at 20:55

## 2023-11-13 RX ADMIN — OXYCODONE HYDROCHLORIDE 10 MG: 5 TABLET ORAL at 10:26

## 2023-11-13 RX ADMIN — OXYCODONE HYDROCHLORIDE 10 MG: 5 TABLET ORAL at 16:24

## 2023-11-13 RX ADMIN — PAROXETINE 40 MG: 40 TABLET, FILM COATED ORAL at 08:43

## 2023-11-13 RX ADMIN — Medication 5 MG: at 20:55

## 2023-11-13 RX ADMIN — CLONAZEPAM 0.5 MG: 0.5 TABLET ORAL at 21:04

## 2023-11-13 RX ADMIN — ACETAMINOPHEN 650 MG: 325 TABLET ORAL at 08:42

## 2023-11-13 ASSESSMENT — PAIN - FUNCTIONAL ASSESSMENT
PAIN_FUNCTIONAL_ASSESSMENT: 0-10

## 2023-11-13 ASSESSMENT — PAIN DESCRIPTION - LOCATION
LOCATION: ABDOMEN

## 2023-11-13 ASSESSMENT — PAIN SCALES - GENERAL
PAINLEVEL_OUTOF10: 3
PAINLEVEL_OUTOF10: 4
PAINLEVEL_OUTOF10: 6
PAINLEVEL_OUTOF10: 8
PAINLEVEL_OUTOF10: 7
PAINLEVEL_OUTOF10: 2
PAINLEVEL_OUTOF10: 7

## 2023-11-13 ASSESSMENT — PAIN DESCRIPTION - ORIENTATION
ORIENTATION: LOWER

## 2023-11-13 NOTE — CARE PLAN
The patient's goals for the shift include      The clinical goals for the shift include No injuries or falls throughout the shift; increase mobility ambulating halls, pain control, increase PO intake    Problem: Pain  Goal: Takes deep breaths with improved pain control throughout the shift  Outcome: Progressing  Goal: Turns in bed with improved pain control throughout the shift  Outcome: Progressing  Goal: Walks with improved pain control throughout the shift  Outcome: Progressing  Goal: Performs ADL's with improved pain control throughout shift  Outcome: Progressing  Goal: Participates in PT with improved pain control throughout the shift  Outcome: Progressing  Goal: Free from opioid side effects throughout the shift  Outcome: Progressing  Goal: Free from acute confusion related to pain meds throughout the shift  Outcome: Progressing     Problem: Skin  Goal: Decreased wound size/increased tissue granulation at next dressing change  Outcome: Progressing  Goal: Participates in plan/prevention/treatment measures  Outcome: Progressing  Goal: Prevent/manage excess moisture  Outcome: Progressing  Goal: Prevent/minimize sheer/friction injuries  Outcome: Progressing  Goal: Promote/optimize nutrition  Outcome: Progressing  Goal: Promote skin healing  Outcome: Progressing

## 2023-11-13 NOTE — PROGRESS NOTES
"Laura No is a 68 y.o. female on day 7 of admission presenting with Pancreatic mass.    Subjective   Improved dysuria. Had a bowel movement overnight. Tolerated easy chew diet. Denies f/c/n/v. Was able to get to chair and ambulate yesterday. Family present with multiple questions regarding discharged answered by team. Patient stated that she will require diflucan for the yeast infections she often gets following treatment of UTIs.     Objective   Constitutional: NAD, resting comfortably in bed  Respiratory: unlabored breathing  Cardiovascular: RR, normotensive  Abdomen: soft, appropriately tender to palpation around incision site, non-distended, midline incision with staples  : no orozco   MSK: Normal ROM     Last Recorded Vitals  Blood pressure 160/77, pulse 86, temperature 36.2 °C (97.2 °F), resp. rate 18, height 1.55 m (5' 1.02\"), weight 71.6 kg (157 lb 13.6 oz), SpO2 97 %.  Intake/Output last 3 Shifts:  I/O last 3 completed shifts:  In: 850 (11.9 mL/kg) [P.O.:850]  Out: 190 (2.7 mL/kg) [Urine:190 (0.1 mL/kg/hr)]  Weight: 71.6 kg     Relevant Results  Results for orders placed or performed during the hospital encounter of 11/06/23 (from the past 24 hour(s))   POCT GLUCOSE   Result Value Ref Range    POCT Glucose 183 (H) 74 - 99 mg/dL   CBC   Result Value Ref Range    WBC 9.7 4.4 - 11.3 x10*3/uL    nRBC 0.0 0.0 - 0.0 /100 WBCs    RBC 2.66 (L) 4.00 - 5.20 x10*6/uL    Hemoglobin 8.1 (L) 12.0 - 16.0 g/dL    Hematocrit 24.9 (L) 36.0 - 46.0 %    MCV 94 80 - 100 fL    MCH 30.5 26.0 - 34.0 pg    MCHC 32.5 32.0 - 36.0 g/dL    RDW 14.8 (H) 11.5 - 14.5 %    Platelets 363 150 - 450 x10*3/uL   POCT GLUCOSE   Result Value Ref Range    POCT Glucose 192 (H) 74 - 99 mg/dL   POCT GLUCOSE   Result Value Ref Range    POCT Glucose 196 (H) 74 - 99 mg/dL   POCT GLUCOSE   Result Value Ref Range    POCT Glucose 233 (H) 74 - 99 mg/dL     Scheduled medications  acetaminophen, 650 mg, oral, q6h  amLODIPine, 10 mg, oral, " Daily  atorvastatin, 80 mg, oral, Nightly  clonazePAM, 0.5 mg, oral, BID  docusate sodium, 100 mg, oral, BID  donepezil, 10 mg, oral, Daily before breakfast  enoxaparin, 40 mg, subcutaneous, q24h  insulin lispro, 0-5 Units, subcutaneous, Before meals & nightly  latanoprost, 1 drop, Both Eyes, Nightly  levothyroxine, 75 mcg, oral, Daily  [Held by provider] lisinopril, 20 mg, oral, Daily  melatonin, 5 mg, oral, Nightly  pantoprazole, 40 mg, oral, Daily before breakfast  PARoxetine, 40 mg, oral, Daily before breakfast  sulfamethoxazole-trimethoprim, 160 mg, oral, BID      Continuous medications     PRN medications  PRN medications: benzocaine-menthol, dextrose 10 % in water (D10W), dextrose, glucagon, ondansetron ODT **OR** ondansetron, oxyCODONE, oxyCODONE, phenoL, promethazine **OR** promethazine      Assessment/Plan   Principal Problem:    Pancreatic mass    Patient is a 69 yo F who is POD7 s/p whipple for unspecified pancreatic mass. She is recovering as expected on the regular nursing floor. Had a BM two days ago. Urine culture positive for enteric bacilli, bactrim course set to 7 days. Pending pre-cert for SNF placement.      Plan:   - HLIVF  - Ucx growing enteric bacilli, cont bactrim, PRN diflucan   - Cont easy chew diet   - colace BID   - Dose of milk of mag today  - Cont pain regimen: Tylenol, oxycodone PRN   - PT/OT, ambulate as tolerated  - SSI. Follow up insulin requirements for home-going plan.   - Oral protonix  - Continue appropriate home medications (paroxetine, donepezil, melatonin, statin, levothyroxine. Started amlodipine. Continue holding lisinopril, Creon, metformin)  - Lovenox   - SCD's   - Dispo: will plan on SNF tomorrow    Case was discussed with attending surgeon Dr. Dia.      MD Souleymane Murrell [phone]

## 2023-11-13 NOTE — SIGNIFICANT EVENT
MD notified by nursing staff that patient's daughter was requesting a phone call to discuss mother's medical care. Attempt was made at 18:35 to call patient's daughter via number relayed by nurse and then confirmed via chart review. Daughter did not answer but a message was left explaining another call would be made tomorrow.

## 2023-11-13 NOTE — PROGRESS NOTES
Physical Therapy                 Therapy Communication Note    Patient Name: Laura No  MRN: [id number]  Today's Date: 11/13/2023     Discipline: Physical Therapy    Missed Visit Reason: Missed Visit Reason:  (Attempted follow up however pt. wanting to eat breakfast.)    Missed Time: Attempt    Comment:

## 2023-11-14 VITALS
DIASTOLIC BLOOD PRESSURE: 84 MMHG | HEIGHT: 61 IN | BODY MASS INDEX: 29.8 KG/M2 | SYSTOLIC BLOOD PRESSURE: 162 MMHG | RESPIRATION RATE: 18 BRPM | TEMPERATURE: 96.8 F | WEIGHT: 157.85 LBS | OXYGEN SATURATION: 96 % | HEART RATE: 80 BPM

## 2023-11-14 PROBLEM — N39.0 UTI (URINARY TRACT INFECTION): Status: ACTIVE | Noted: 2023-11-14

## 2023-11-14 PROBLEM — E83.42 HYPOMAGNESEMIA: Status: ACTIVE | Noted: 2023-11-14

## 2023-11-14 LAB
GLUCOSE BLD MANUAL STRIP-MCNC: 166 MG/DL (ref 74–99)
GLUCOSE BLD MANUAL STRIP-MCNC: 193 MG/DL (ref 74–99)
GLUCOSE BLD MANUAL STRIP-MCNC: 214 MG/DL (ref 74–99)
GLUCOSE BLD MANUAL STRIP-MCNC: 222 MG/DL (ref 74–99)
GLUCOSE BLD MANUAL STRIP-MCNC: 224 MG/DL (ref 74–99)

## 2023-11-14 PROCEDURE — 97116 GAIT TRAINING THERAPY: CPT | Mod: GP,CQ

## 2023-11-14 PROCEDURE — 2500000004 HC RX 250 GENERAL PHARMACY W/ HCPCS (ALT 636 FOR OP/ED): Performed by: SURGERY

## 2023-11-14 PROCEDURE — 82947 ASSAY GLUCOSE BLOOD QUANT: CPT

## 2023-11-14 PROCEDURE — 2500000001 HC RX 250 WO HCPCS SELF ADMINISTERED DRUGS (ALT 637 FOR MEDICARE OP)

## 2023-11-14 PROCEDURE — 96372 THER/PROPH/DIAG INJ SC/IM: CPT

## 2023-11-14 PROCEDURE — 2500000004 HC RX 250 GENERAL PHARMACY W/ HCPCS (ALT 636 FOR OP/ED)

## 2023-11-14 PROCEDURE — 97110 THERAPEUTIC EXERCISES: CPT | Mod: GP,CQ

## 2023-11-14 PROCEDURE — 2500000001 HC RX 250 WO HCPCS SELF ADMINISTERED DRUGS (ALT 637 FOR MEDICARE OP): Performed by: SURGERY

## 2023-11-14 PROCEDURE — 2500000005 HC RX 250 GENERAL PHARMACY W/O HCPCS: Performed by: STUDENT IN AN ORGANIZED HEALTH CARE EDUCATION/TRAINING PROGRAM

## 2023-11-14 RX ORDER — OXYCODONE HYDROCHLORIDE 5 MG/1
5 TABLET ORAL EVERY 4 HOURS PRN
Qty: 15 TABLET | Refills: 0
Start: 2023-11-14 | End: 2023-11-21

## 2023-11-14 RX ORDER — SULFAMETHOXAZOLE AND TRIMETHOPRIM 800; 160 MG/1; MG/1
1 TABLET ORAL 2 TIMES DAILY
Start: 2023-11-14 | End: 2023-11-21 | Stop reason: WASHOUT

## 2023-11-14 RX ORDER — PANTOPRAZOLE SODIUM 40 MG/1
40 TABLET, DELAYED RELEASE ORAL
Start: 2023-11-14

## 2023-11-14 RX ORDER — DOCUSATE SODIUM 100 MG/1
100 CAPSULE, LIQUID FILLED ORAL 2 TIMES DAILY
Start: 2023-11-14

## 2023-11-14 RX ORDER — ENOXAPARIN SODIUM 100 MG/ML
40 INJECTION SUBCUTANEOUS EVERY 24 HOURS
Start: 2023-11-14

## 2023-11-14 RX ADMIN — ACETAMINOPHEN 650 MG: 325 TABLET ORAL at 21:20

## 2023-11-14 RX ADMIN — OXYCODONE HYDROCHLORIDE 10 MG: 5 TABLET ORAL at 18:31

## 2023-11-14 RX ADMIN — ATORVASTATIN CALCIUM 80 MG: 80 TABLET, FILM COATED ORAL at 21:20

## 2023-11-14 RX ADMIN — INSULIN LISPRO 1 UNITS: 100 INJECTION, SOLUTION INTRAVENOUS; SUBCUTANEOUS at 21:20

## 2023-11-14 RX ADMIN — LEVOTHYROXINE SODIUM 75 MCG: 75 TABLET ORAL at 05:57

## 2023-11-14 RX ADMIN — PAROXETINE 40 MG: 40 TABLET, FILM COATED ORAL at 08:44

## 2023-11-14 RX ADMIN — LIDOCAINE 1 PATCH: 4 PATCH TOPICAL at 08:47

## 2023-11-14 RX ADMIN — DOCUSATE SODIUM 100 MG: 100 CAPSULE, LIQUID FILLED ORAL at 21:20

## 2023-11-14 RX ADMIN — ACETAMINOPHEN 650 MG: 325 TABLET ORAL at 14:40

## 2023-11-14 RX ADMIN — AMLODIPINE BESYLATE 10 MG: 10 TABLET ORAL at 08:45

## 2023-11-14 RX ADMIN — PANTOPRAZOLE SODIUM 40 MG: 40 TABLET, DELAYED RELEASE ORAL at 08:45

## 2023-11-14 RX ADMIN — ACETAMINOPHEN 650 MG: 325 TABLET ORAL at 03:49

## 2023-11-14 RX ADMIN — INSULIN LISPRO 2 UNITS: 100 INJECTION, SOLUTION INTRAVENOUS; SUBCUTANEOUS at 18:23

## 2023-11-14 RX ADMIN — CLONAZEPAM 0.5 MG: 0.5 TABLET ORAL at 21:20

## 2023-11-14 RX ADMIN — LATANOPROST 1 DROP: 50 SOLUTION OPHTHALMIC at 21:20

## 2023-11-14 RX ADMIN — DONEPEZIL HYDROCHLORIDE 10 MG: 10 TABLET, FILM COATED ORAL at 08:44

## 2023-11-14 RX ADMIN — Medication 5 MG: at 21:20

## 2023-11-14 RX ADMIN — CLONAZEPAM 0.5 MG: 0.5 TABLET ORAL at 08:45

## 2023-11-14 RX ADMIN — OXYCODONE HYDROCHLORIDE 10 MG: 5 TABLET ORAL at 13:07

## 2023-11-14 RX ADMIN — INSULIN LISPRO 2 UNITS: 100 INJECTION, SOLUTION INTRAVENOUS; SUBCUTANEOUS at 13:08

## 2023-11-14 RX ADMIN — OXYCODONE HYDROCHLORIDE 10 MG: 5 TABLET ORAL at 06:09

## 2023-11-14 RX ADMIN — DOCUSATE SODIUM 100 MG: 100 CAPSULE, LIQUID FILLED ORAL at 08:45

## 2023-11-14 RX ADMIN — INSULIN LISPRO 1 UNITS: 100 INJECTION, SOLUTION INTRAVENOUS; SUBCUTANEOUS at 08:45

## 2023-11-14 RX ADMIN — SULFAMETHOXAZOLE AND TRIMETHOPRIM 160 MG: 800; 160 TABLET ORAL at 08:45

## 2023-11-14 RX ADMIN — ENOXAPARIN SODIUM 40 MG: 100 INJECTION SUBCUTANEOUS at 15:45

## 2023-11-14 RX ADMIN — ACETAMINOPHEN 650 MG: 325 TABLET ORAL at 08:45

## 2023-11-14 RX ADMIN — SULFAMETHOXAZOLE AND TRIMETHOPRIM 160 MG: 800; 160 TABLET ORAL at 21:20

## 2023-11-14 ASSESSMENT — COGNITIVE AND FUNCTIONAL STATUS - GENERAL
MOVING TO AND FROM BED TO CHAIR: A LITTLE
TURNING FROM BACK TO SIDE WHILE IN FLAT BAD: A LITTLE
HELP NEEDED FOR BATHING: A LITTLE
HELP NEEDED FOR BATHING: A LITTLE
DAILY ACTIVITIY SCORE: 23
CLIMB 3 TO 5 STEPS WITH RAILING: A LOT
DAILY ACTIVITIY SCORE: 23
MOVING FROM LYING ON BACK TO SITTING ON SIDE OF FLAT BED WITH BEDRAILS: A LITTLE
STANDING UP FROM CHAIR USING ARMS: A LITTLE
STANDING UP FROM CHAIR USING ARMS: A LITTLE
MOVING TO AND FROM BED TO CHAIR: A LITTLE
TURNING FROM BACK TO SIDE WHILE IN FLAT BAD: A LITTLE
CLIMB 3 TO 5 STEPS WITH RAILING: A LOT
MOBILITY SCORE: 17
MOVING FROM LYING ON BACK TO SITTING ON SIDE OF FLAT BED WITH BEDRAILS: A LITTLE
CLIMB 3 TO 5 STEPS WITH RAILING: A LOT
MOVING FROM LYING ON BACK TO SITTING ON SIDE OF FLAT BED WITH BEDRAILS: A LITTLE
STANDING UP FROM CHAIR USING ARMS: A LITTLE
WALKING IN HOSPITAL ROOM: A LITTLE
MOBILITY SCORE: 17
WALKING IN HOSPITAL ROOM: A LITTLE
WALKING IN HOSPITAL ROOM: A LITTLE
MOVING TO AND FROM BED TO CHAIR: A LITTLE
MOBILITY SCORE: 17
TURNING FROM BACK TO SIDE WHILE IN FLAT BAD: A LITTLE

## 2023-11-14 ASSESSMENT — PAIN SCALES - GENERAL
PAINLEVEL_OUTOF10: 7
PAINLEVEL_OUTOF10: 8
PAINLEVEL_OUTOF10: 7
PAINLEVEL_OUTOF10: 0 - NO PAIN
PAINLEVEL_OUTOF10: 0 - NO PAIN
PAINLEVEL_OUTOF10: 3
PAINLEVEL_OUTOF10: 8
PAINLEVEL_OUTOF10: 4

## 2023-11-14 ASSESSMENT — PAIN - FUNCTIONAL ASSESSMENT
PAIN_FUNCTIONAL_ASSESSMENT: 0-10

## 2023-11-14 ASSESSMENT — PAIN DESCRIPTION - LOCATION
LOCATION: ABDOMEN
LOCATION: ABDOMEN

## 2023-11-14 NOTE — DISCHARGE SUMMARY
Discharge Diagnosis  Pancreatic mass    Issues Requiring Follow-Up  Post op follow up    Test Results Pending At Discharge  Pending Labs       Order Current Status    Surgical Pathology Exam In process    Urine Culture Preliminary result            Hospital Course  Laura No is a 68 yr old male s/p 1. Whipple Procedure 2. Excisional Wedge Liver Biopsy, x2 on 11/6.  NGT tube removed on POD1 (11/7). Home meds were continued(levothyroxine, paroxetine, holding amlodipine, statin, lisinopril) on POD1 (11/7). Orozco removed POD2 (11/8), and patient passed TOV. Patient continued to improve with increased ambulation. POD5 patient had return of bowel function. Patient had developed UTI (has a history of frequent UTIs) and is being treated with bactrim. On day of discharge, patient was tolerating a regular diet, pain was adequately controlled with PO pain medications. Pt was ambulatory and voiding on own without difficulty. Patient will follow-up in a 2-3 weeks for post-check on 11/21. Patient will be discharged on lovenox for a total of 28 days and a PPI.     Pertinent Physical Exam At Time of Discharge  Physical Exam  Constitutional: NAD, resting comfortably in bed  Respiratory: unlabored breathing  Cardiovascular: RR, normotensive  Abdomen: soft, appropriately tender to palpation around incision site, non-distended, midline incision with staples  : no orozco   MSK: Normal ROM     Home Medications     Medication List      START taking these medications     docusate sodium 100 mg capsule; Commonly known as: Colace; Take 1   capsule (100 mg) by mouth 2 times a day.   enoxaparin 40 mg/0.4 mL syringe; Commonly known as: Lovenox; Inject 0.4   mL (40 mg) under the skin once every 24 hours.   oxyCODONE 5 mg immediate release tablet; Commonly known as: Roxicodone;   Take 1 tablet (5 mg) by mouth every 4 hours if needed for moderate pain (4   - 6) or severe pain (7 - 10) for up to 7 days.   pantoprazole 40 mg EC tablet; Commonly  known as: ProtoNix; Take 1 tablet   (40 mg) by mouth once daily in the morning. Take before meals. Do not   crush, chew, or split.   sulfamethoxazole-trimethoprim 800-160 mg tablet; Commonly known as:   Bactrim DS; Take 1 tablet by mouth 2 times a day for 10 doses.     CONTINUE taking these medications     acetaminophen 325 mg tablet; Commonly known as: Tylenol   amLODIPine 10 mg tablet; Commonly known as: Norvasc   atorvastatin 80 mg tablet; Commonly known as: Lipitor   cetirizine 10 mg tablet; Commonly known as: ZyrTEC   clonazePAM 0.5 mg tablet; Commonly known as: KlonoPIN   Creon 12,000-38,000 -60,000 unit capsule; Generic drug: pancrelipase   (Lip-Prot-Amyl)   cyclobenzaprine 10 mg tablet; Commonly known as: Flexeril   diclofenac sodium 1 % gel gel; Commonly known as: Voltaren   donepezil 10 mg tablet; Commonly known as: Aricept   FeroSuL tablet; Generic drug: ferrous sulfate (325 mg ferrous sulfate)   fluticasone 50 mcg/actuation nasal spray; Commonly known as: Flonase   glucose 4 gram chewable tablet   insulin lispro 100 unit/mL injection; Commonly known as: HumaLOG   Lantus U-100 Insulin 100 unit/mL injection; Generic drug: insulin   glargine   latanoprost 0.005 % ophthalmic solution; Commonly known as: Xalatan   levothyroxine 75 mcg tablet; Commonly known as: Synthroid, Levoxyl   lisinopril 20 mg tablet   melatonin 5 mg tablet   metFORMIN 850 mg tablet; Commonly known as: Glucophage   Multivitamin Women 50 Plus 8 mg iron-400 mcg-50 mcg tablet; Generic   drug: multivit-min-iron-FA-vit K-lut   omeprazole 20 mg DR capsule; Commonly known as: PriLOSEC   ondansetron 4 mg tablet; Commonly known as: Zofran   OneTouch Ultra Control solution; Generic drug: blood glucose control,   normal   OneTouch Ultra Test strip; Generic drug: blood sugar diagnostic   OneTouch Ultra2 Meter misc; Generic drug: blood-glucose meter   * OneTouch UltraSoft Lancets misc; Generic drug: lancets   * OneTouch Delica Plus Lancet 33 gauge  misc; Generic drug: lancets   PARoxetine 40 mg tablet; Commonly known as: Paxil   polyethylene glycol 17 gram/dose powder; Commonly known as: Glycolax,   Miralax   senna 8.6 mg tablet; Generic drug: sennosides   traMADol 50 mg tablet; Commonly known as: Ultram  * This list has 2 medication(s) that are the same as other medications   prescribed for you. Read the directions carefully, and ask your doctor or   other care provider to review them with you.     STOP taking these medications     lidocaine 5 % patch; Commonly known as: Lidoderm   methocarbamol 500 mg tablet; Commonly known as: Robaxin   Milk of Magnesia 400 mg/5 mL suspension; Generic drug: magnesium   hydroxide       Outpatient Follow-Up  Future Appointments   Date Time Provider Department Center   11/21/2023 11:45 AM Alex Dia MD [id number] [facility]       Aristides Márquez MD

## 2023-11-14 NOTE — CARE PLAN
Problem: Pain  Goal: Takes deep breaths with improved pain control throughout the shift  Outcome: Progressing  Goal: Turns in bed with improved pain control throughout the shift  Outcome: Progressing  Goal: Walks with improved pain control throughout the shift  Outcome: Progressing  Goal: Performs ADL's with improved pain control throughout shift  Outcome: Progressing  Goal: Participates in PT with improved pain control throughout the shift  Outcome: Progressing  Goal: Free from opioid side effects throughout the shift  Outcome: Progressing  Goal: Free from acute confusion related to pain meds throughout the shift  Outcome: Progressing     Problem: Skin  Goal: Decreased wound size/increased tissue granulation at next dressing change  Outcome: Progressing  Goal: Participates in plan/prevention/treatment measures  Outcome: Progressing  Goal: Prevent/manage excess moisture  Outcome: Progressing  Goal: Prevent/minimize sheer/friction injuries  Outcome: Progressing  Goal: Promote/optimize nutrition  Outcome: Progressing  Goal: Promote skin healing  Outcome: Progressing   The patient's goals for the shift include      The clinical goals for the shift include Pt to have no falls during shift    Pt had no falls during shifts.

## 2023-11-14 NOTE — PROGRESS NOTES
Patient will be transferred to [facility] today at 5:30pm and patient and has been notified and is in agreement with the plan. SW left a VM for the patient's daughter regarding discharge to [facility].

## 2023-11-14 NOTE — PROGRESS NOTES
Physical Therapy    Physical Therapy Treatment    Patient Name: Laura No  MRN: [id number]  Today's Date: 11/14/2023  Time Calculation  Start Time: 0812  Stop Time: 0839  Time Calculation (min): 27 min       Assessment/Plan   PT Assessment  PT Assessment Results: Decreased strength, Decreased range of motion, Decreased endurance, Impaired balance, Decreased mobility, Decreased cognition, Pain  Rehab Prognosis: Good  Evaluation/Treatment Tolerance: Patient tolerated treatment well  Medical Staff Made Aware: Yes  Strengths: Housing layout, Support of Caregivers  End of Session Communication: Bedside nurse  Assessment Comment: Pt. is a 68 yof that presents with increased pain, decreased functional strength, decreased activity tolerance/endurance, and impaired balance. Pt. will benefit from skilled PT intervention to address these deficits. At this time, pt. requires 24/7 care and is not safe to d/c home alone.  End of Session Patient Position: Bed, 3 rail up, Alarm on  PT Plan  Inpatient/Swing Bed or Outpatient: Inpatient  PT Plan  Treatment/Interventions: Bed mobility, Transfer training, Gait training, Stair training, Balance training, Strengthening, Endurance training, Range of motion, Therapeutic exercise, Therapeutic activity, Home exercise program, Postural re-education  PT Plan: Skilled PT  PT Frequency: 3 times per week  PT Discharge Recommendations: Moderate intensity level of continued care  Equipment Recommended upon Discharge: Wheeled walker  PT Recommended Transfer Status: Assist x1, Assistive device  PT - OK to Discharge: Yes (Eval complete, refer to dispo)      General Visit Information:   PT  Visit  PT Received On: 11/14/23  Response to Previous Treatment: Patient with no complaints from previous session.  General  Reason for Referral: s/p whipple 11/6  Past Medical History Relevant to Rehab: pancreatic head cancer, HTN, T2DM, hypothyroidism, GERD, Dementia, OA  Family/Caregiver Present: No  Prior to  "Session Communication: Bedside nurse  General Comment: Pt. supine upon arrival. Pt. willing to participate and stating no complaints.    Subjective   Precautions:  Precautions  Medical Precautions: Abdominal precautions, Fall precautions  Post-Surgical Precautions: Abdominal surgery precautions  Vital Signs:       Objective   Pain:     Cognition:     Postural Control:     Extremity/Trunk Assessments:                    Activity Tolerance:     Treatments:  Therapeutic Exercise  Therapeutic Exercise Performed: Yes  Therapeutic Exercise Activity 1: Supine bilat le ex AP'S, QS, SAQ'S, HS, AND ABD X 15 REPS.    Bed Mobility  Bed Mobility: Yes  Bed Mobility 1  Bed Mobility 1: Supine to sitting  Level of Assistance 1:  (Supervision)    Ambulation/Gait Training  Ambulation/Gait Training Performed: Yes  Ambulation/Gait Training 1  Surface 1:  (Pt. amb. 90' using a wh. walker and close sba/light cga - cues to focus on knee extension in stance as pt. reports left knee is \"bone on bone\" and that it gives out at times. Pt. stops and slows down at times.)  Transfers  Transfer: Yes  Transfer 1  Transfer From 1: Sit to  Transfer to 1: Stand  Trials/Comments 1: Pt. requires light cga/close sba for safety  Transfers 2  Transfer From 2: Stand to  Transfer to 2: Sit  Transfer Level of Assistance 2: Contact guard (CGA/min assist as pt. has decreased eccentric control.)    Outcome Measures:  [facility] Basic Mobility  Turning from your back to your side while in a flat bed without using bedrails: A little  Moving from lying on your back to sitting on the side of a flat bed without using bedrails: A little  Moving to and from bed to chair (including a wheelchair): A little  Standing up from a chair using your arms (e.g. wheelchair or bedside chair): A little  To walk in hospital room: A little  Climbing 3-5 steps with railing: A lot  Basic Mobility - Total Score: 17    Education Documentation  Precautions, taught by Gena Sumner, PTA at " 11/14/2023  8:52 AM.  Learner: Patient  Readiness: Acceptance  Method: Demonstration, Explanation  Response: Needs Reinforcement    Home Exercise Program, taught by Gena Sumner PTA at 11/14/2023  8:52 AM.  Learner: Patient  Readiness: Acceptance  Method: Demonstration, Explanation  Response: Needs Reinforcement    Mobility Training, taught by Gena Sumner PTA at 11/14/2023  8:52 AM.  Learner: Patient  Readiness: Acceptance  Method: Demonstration, Explanation  Response: Needs Reinforcement    Education Comments  No comments found.        OP EDUCATION:       Encounter Problems       Encounter Problems (Active)       Balance       Patient to demo static standing with unilateral UE support, performing single UE task with CGAx1, no sway or LOB x 2 mins for functional carryover  (Progressing)       Start:  11/07/23    Expected End:  11/21/23               Mobility       Patient will ambulate >/= 50' with CGAx1 and LRAD (Progressing)       Start:  11/07/23    Expected End:  11/21/23            Pt. will tolerate >/= 8 minutes of OOB mobility without seated rest break and VSS to demo improved activity tolerance/endurance.  (Progressing)       Start:  11/07/23    Expected End:  11/21/23               Transfers       Patient will perform bed mobility Selin with HOB flat and no rails (Progressing)       Start:  11/07/23    Expected End:  11/21/23            Patient will transfer sit to and from stand with CGAx1 and LRAD (Progressing)       Start:  11/07/23    Expected End:  11/21/23

## 2023-11-14 NOTE — PROGRESS NOTES
"Laura No is a 68 y.o. female on day 8 of admission presenting with Pancreatic mass.    Subjective   No acute overnight events. Tolerated easy chew diet. Denies f/c/n/v. Was able to get to chair and ambulate yesterday. Clarified medications that patient takes at home.     Objective   Constitutional: NAD, resting comfortably in bed  Respiratory: unlabored breathing  Cardiovascular: RR, normotensive  Abdomen: soft, appropriately tender to palpation around incision site, non-distended, midline incision with staples  : no orozco   MSK: Normal ROM     Last Recorded Vitals  Blood pressure 153/80, pulse 90, temperature 36.2 °C (97.2 °F), temperature source Temporal, resp. rate 18, height 1.55 m (5' 1.02\"), weight 71.6 kg (157 lb 13.6 oz), SpO2 95 %.  Intake/Output last 3 Shifts:  I/O last 3 completed shifts:  In: 700 (9.8 mL/kg) [P.O.:700]  Out: 0 (0 mL/kg)   Weight: 71.6 kg     Relevant Results  Results for orders placed or performed during the hospital encounter of 11/06/23 (from the past 24 hour(s))   POCT GLUCOSE   Result Value Ref Range    POCT Glucose 198 (H) 74 - 99 mg/dL   POCT GLUCOSE   Result Value Ref Range    POCT Glucose 204 (H) 74 - 99 mg/dL   POCT GLUCOSE   Result Value Ref Range    POCT Glucose 220 (H) 74 - 99 mg/dL     Scheduled medications  acetaminophen, 650 mg, oral, q6h  amLODIPine, 10 mg, oral, Daily  atorvastatin, 80 mg, oral, Nightly  clonazePAM, 0.5 mg, oral, BID  docusate sodium, 100 mg, oral, BID  donepezil, 10 mg, oral, Daily before breakfast  enoxaparin, 40 mg, subcutaneous, q24h  insulin lispro, 0-5 Units, subcutaneous, Before meals & nightly  latanoprost, 1 drop, Both Eyes, Nightly  levothyroxine, 75 mcg, oral, Daily  lidocaine, 1 patch, transdermal, Daily  [Held by provider] lisinopril, 20 mg, oral, Daily  melatonin, 5 mg, oral, Nightly  pantoprazole, 40 mg, oral, Daily before breakfast  PARoxetine, 40 mg, oral, Daily before breakfast  sulfamethoxazole-trimethoprim, 160 mg, oral, " BID      Continuous medications     PRN medications  PRN medications: benzocaine-menthol, dextrose 10 % in water (D10W), dextrose, glucagon, ondansetron ODT **OR** ondansetron, oxyCODONE, oxyCODONE, phenoL, promethazine **OR** promethazine      Assessment/Plan   Principal Problem:    Pancreatic mass    Patient is a 67 yo F who is POD7 s/p whipple for unspecified pancreatic mass. She is recovering as expected on the regular nursing floor. Had a BM two days ago. Urine culture positive for enteric bacilli, bactrim course set to 7 days. Pending pre-cert for SNF placement.      Plan:   - HLIVF  - Ucx growing enteric bacilli, cont bactrim, PRN diflucan   - Cont easy chew diet   - colace BID   - Cont pain regimen: Tylenol, oxycodone PRN   - PT/OT, ambulate as tolerated  - SSI. Follow up insulin requirements for home-going plan.   - Oral protonix  - Continue appropriate home medications (paroxetine, donepezil, melatonin, statin, levothyroxine. Started amlodipine. Continue holding lisinopril, Creon, metformin)  - Lovenox   - SCD's   - Dispo: will plan on SNF, follow up next Tuesday or Wednesday     Case was discussed with attending surgeon Dr. Dia.      MD Souleymane Murrell [phone]

## 2023-11-15 DIAGNOSIS — Z90.410 H/O WHIPPLE PROCEDURE: Primary | ICD-10-CM

## 2023-11-15 DIAGNOSIS — N39.0 URINARY TRACT INFECTION ASSOCIATED WITH CATHETERIZATION OF URINARY TRACT, UNSPECIFIED INDWELLING URINARY CATHETER TYPE, SUBSEQUENT ENCOUNTER: ICD-10-CM

## 2023-11-15 DIAGNOSIS — T83.511D URINARY TRACT INFECTION ASSOCIATED WITH CATHETERIZATION OF URINARY TRACT, UNSPECIFIED INDWELLING URINARY CATHETER TYPE, SUBSEQUENT ENCOUNTER: ICD-10-CM

## 2023-11-15 DIAGNOSIS — Z90.49 H/O WHIPPLE PROCEDURE: Primary | ICD-10-CM

## 2023-11-15 LAB — BACTERIA UR CULT: ABNORMAL

## 2023-11-15 RX ORDER — AMOXICILLIN AND CLAVULANATE POTASSIUM 875; 125 MG/1; MG/1
1 TABLET, FILM COATED ORAL 2 TIMES DAILY
Qty: 6 TABLET | Refills: 0 | Status: SHIPPED | OUTPATIENT
Start: 2023-11-15 | End: 2023-11-18

## 2023-11-15 RX ORDER — LIDOCAINE 50 MG/G
1 PATCH TOPICAL DAILY
Qty: 30 PATCH | Refills: 0 | Status: SHIPPED | OUTPATIENT
Start: 2023-11-15 | End: 2023-12-15

## 2023-11-15 RX ORDER — ADHESIVE BANDAGE
30 BANDAGE TOPICAL DAILY PRN
Qty: 360 ML | Refills: 2 | Status: SHIPPED | OUTPATIENT
Start: 2023-11-15 | End: 2023-11-25

## 2023-11-15 NOTE — NURSING NOTE
11/14/23 Discharge Planning Note    Pt discharged to SNF set up by care coordinator. Pt is alert and oriented x4, able to ambulate and perform ADLs with minimal assistance. Discharge instructions reviewed with patient by previous nurse, instructions reinforced with pt and pt has no questions at this time. Personal belongings returned to pt. IV removed with catheter intact. pt remains safe and free from injury upon discharge.    Caroline Castillo RN

## 2023-11-16 ENCOUNTER — NURSING HOME VISIT (OUTPATIENT)
Dept: POST ACUTE CARE | Facility: EXTERNAL LOCATION | Age: 68
End: 2023-11-16
Payer: MEDICAID

## 2023-11-16 DIAGNOSIS — E03.9 HYPOTHYROIDISM, UNSPECIFIED TYPE: ICD-10-CM

## 2023-11-16 DIAGNOSIS — G30.9 MILD ALZHEIMER'S DEMENTIA WITHOUT BEHAVIORAL DISTURBANCE, PSYCHOTIC DISTURBANCE, MOOD DISTURBANCE, OR ANXIETY, UNSPECIFIED TIMING OF DEMENTIA ONSET (MULTI): ICD-10-CM

## 2023-11-16 DIAGNOSIS — D64.9 ANEMIA, UNSPECIFIED TYPE: ICD-10-CM

## 2023-11-16 DIAGNOSIS — E11.9 TYPE 2 DIABETES MELLITUS WITHOUT COMPLICATION, UNSPECIFIED WHETHER LONG TERM INSULIN USE (MULTI): ICD-10-CM

## 2023-11-16 DIAGNOSIS — K21.9 GASTROESOPHAGEAL REFLUX DISEASE WITHOUT ESOPHAGITIS: ICD-10-CM

## 2023-11-16 DIAGNOSIS — G47.00 INSOMNIA, UNSPECIFIED TYPE: ICD-10-CM

## 2023-11-16 DIAGNOSIS — F02.A0 MILD ALZHEIMER'S DEMENTIA WITHOUT BEHAVIORAL DISTURBANCE, PSYCHOTIC DISTURBANCE, MOOD DISTURBANCE, OR ANXIETY, UNSPECIFIED TIMING OF DEMENTIA ONSET (MULTI): ICD-10-CM

## 2023-11-16 DIAGNOSIS — G89.18 POST-OPERATIVE PAIN: ICD-10-CM

## 2023-11-16 DIAGNOSIS — R53.81 PHYSICAL DEBILITY: Primary | ICD-10-CM

## 2023-11-16 DIAGNOSIS — T78.40XS ALLERGY, SEQUELA: ICD-10-CM

## 2023-11-16 DIAGNOSIS — E78.5 HYPERLIPIDEMIA, UNSPECIFIED HYPERLIPIDEMIA TYPE: ICD-10-CM

## 2023-11-16 DIAGNOSIS — K86.89 PANCREATIC MASS (HHS-HCC): ICD-10-CM

## 2023-11-16 DIAGNOSIS — I10 HYPERTENSION, UNSPECIFIED TYPE: ICD-10-CM

## 2023-11-16 DIAGNOSIS — M17.9 OSTEOARTHRITIS OF KNEE, UNSPECIFIED LATERALITY, UNSPECIFIED OSTEOARTHRITIS TYPE: ICD-10-CM

## 2023-11-16 DIAGNOSIS — N39.0 URINARY TRACT INFECTION WITHOUT HEMATURIA, SITE UNSPECIFIED: ICD-10-CM

## 2023-11-16 DIAGNOSIS — F32.A DEPRESSION, UNSPECIFIED DEPRESSION TYPE: ICD-10-CM

## 2023-11-16 PROCEDURE — G0317 PROLONG NURSING FAC EVAL 15M: HCPCS | Performed by: FAMILY MEDICINE

## 2023-11-16 PROCEDURE — 99310 SBSQ NF CARE HIGH MDM 45: CPT | Performed by: FAMILY MEDICINE

## 2023-11-16 ASSESSMENT — ENCOUNTER SYMPTOMS: DYSURIA: 1

## 2023-11-16 NOTE — LETTER
Patient: Laura No  : 1955    Encounter Date: 2023    Nursing Home Visit  Name: Laura No  YOB: 1955  MRN: [id number]    Chief Complaint  Follow up on new admission  Subjective  HPI:   68 year old woman with early Alzheimer's dementia, HTN, poorly controlled T2DM (last HbA1c = 10.8% 23; insulin-requiring), hypothyroidism, HLD,  NCNC anemia, hx COVID 2020, depression, and DJD s/p RTKA 2008 who was seen at [facility].  Per hospital notes:  The patient obtains her care from the [facility] and she was recently admitted to [facility] -23 with hyperglycemia (428 - no ketosis), abd pain, N/V found to have pancreatic head mass on CT with ERCP/EUS showing biliary duct obstruction and underwent evaluation for obstructive jaundice secondary to pancreatic mass with elevated  301.  She was also seen by a pancreatic surgeon there with recommendations to proceed with Whipple resection, but the patient's daughter was dissatisfied with her mother's care there, and she wanted to obtain another opinion.   She was admitted to  for 1. Whipple Procedure 2. Excisional Wedge Liver Biopsy, x2 on .  NGT tube removed on POD1 (). Home meds were continued (levothyroxine, paroxetine, holding amlodipine, statin, lisinopril) on POD1 (). Bernard removed POD2 (), and patient passed TOV. Patient continued to improve with increased ambulation. POD5 patient had return of bowel function. Patient had developed UTI (has a history of frequent UTIs) and was being treated with bactrim. On day of discharge, patient was tolerating a regular diet, pain was adequately controlled with PO pain medications. Pt was ambulatory and voiding on own without difficulty. Patient will follow-up in a 2-3 weeks for post-check on . Patient will be discharged on lovenox for a total of 28 days and a PPI.   ----  Today patient sitting in bed she states she is in pain /10. Reports it is abdominal pain from the  surgery. Appetite is poor.  Denies HA, dizziness, SOB, CP, N&V or constipation.      Review of systems  Reviewed chart looking at current medications, treatment, labs and x-rays and note pertinent positives or negatives, otherwise 10 point system review negative except for what is noted in HPI.    Objective  VS: 132/76, 97.6, 78, 18, 98%, 150#    Physical exam:   Physical Exam  Vitals and nursing note reviewed.   Constitutional:       Appearance: Normal appearance. She is well-developed and normal weight.   HENT:      Head: Normocephalic and atraumatic.      Nose: Nose normal.      Mouth/Throat:      Mouth: Mucous membranes are moist.   Cardiovascular:      Rate and Rhythm: Normal rate and regular rhythm.      Pulses: Normal pulses.      Heart sounds: Normal heart sounds.   Pulmonary:      Effort: Pulmonary effort is normal.      Breath sounds: Normal breath sounds.   Abdominal:      General: Abdomen is flat. Bowel sounds are normal.      Palpations: Abdomen is soft.      Comments: Midline abdominal incision with staples intact, no drainage, BENNIE   Musculoskeletal:      Comments: STEWART   Skin:     General: Skin is warm and dry.   Neurological:      Mental Status: She is oriented to person, place, and time and easily aroused.   Psychiatric:         Attention and Perception: Attention normal.         Mood and Affect: Affect is flat.         Speech: Speech normal.         Behavior: Behavior normal. Behavior is cooperative.         Thought Content: Thought content normal.         Cognition and Memory: Cognition normal.         Judgment: Judgment normal.     Assessment/Plan   68 year old woman with early Alzheimer's dementia, HTN, poorly-controlled T2DM (last HbA1c = 10.8% 9/22/23; insulin-requiring), hypothyroidism, hyperlipidemia, NCNC anemia, history of COVID 12/2020, depression, and DJD with R knee replacement 12/2008, whipple procedure, liver bx.    Physical debility   For PT/OT for mobility, gait training, endurance,  safety awareness, ADLs, and assessment of post-rehab needs.    Pancreatic mass  Malignant mass s/p whipple, liver bx, c/w Creon    Post-operative pain  C/w prn oxycodone and tylenol    HTN (hypertension)  C/w amlodipine, lisinopril    UTI (urinary tract infection)  C/w Bactrim, stop date 11/25    Hypothyroidism  C/w levothyroxine    HLD (hyperlipidemia)  C/w atorvastatin    Osteoarthritis  C/w Voltaren gel    Diabetes mellitus, type 2 (CMS/HCC)  C/w metformin and SSI    Dementia (CMS/HCC)  C/w donapezil    Gastroesophageal reflux disease  C/w omperazole    Allergies  C/w Cetrizine, fluticasone    Anemia  C/w with iron    Depression  C/w paroxetine, clonazepam    Insomnia  C/w melatonin        Electronically Signed By: MANINDER Munson-CNP   11/20/23 10:13 PM

## 2023-11-18 NOTE — PROGRESS NOTES
Nursing Home Visit  Name: Laura No  YOB: 1955  MRN: [id number]    Chief Complaint  Follow up on new admission  Subjective  HPI:   68 year old woman with early Alzheimer's dementia, HTN, poorly controlled T2DM (last HbA1c = 10.8% 9/22/23; insulin-requiring), hypothyroidism, HLD,  NCNC anemia, hx COVID 12/2020, depression, and DJD s/p RTKA 12/2008 who was seen at [facility].  Per hospital notes:  The patient obtains her care from the [facility] and she was recently admitted to [facility] 9/11-9/19/23 with hyperglycemia (428 - no ketosis), abd pain, N/V found to have pancreatic head mass on CT with ERCP/EUS showing biliary duct obstruction and underwent evaluation for obstructive jaundice secondary to pancreatic mass with elevated  301.  She was also seen by a pancreatic surgeon there with recommendations to proceed with Whipple resection, but the patient's daughter was dissatisfied with her mother's care there, and she wanted to obtain another opinion.   She was admitted to  for 1. Whipple Procedure 2. Excisional Wedge Liver Biopsy, x2 on 11/6.  NGT tube removed on POD1 (11/7). Home meds were continued (levothyroxine, paroxetine, holding amlodipine, statin, lisinopril) on POD1 (11/7). Bernard removed POD2 (11/8), and patient passed TOV. Patient continued to improve with increased ambulation. POD5 patient had return of bowel function. Patient had developed UTI (has a history of frequent UTIs) and was being treated with bactrim. On day of discharge, patient was tolerating a regular diet, pain was adequately controlled with PO pain medications. Pt was ambulatory and voiding on own without difficulty. Patient will follow-up in a 2-3 weeks for post-check on 11/21. Patient will be discharged on lovenox for a total of 28 days and a PPI.   ----11/16  Today patient sitting in bed she states she is in pain 7/10. Reports it is abdominal pain from the surgery. Appetite is poor.  Denies HA, dizziness, SOB, CP, N&V or  constipation.      Review of systems  Reviewed chart looking at current medications, treatment, labs and x-rays and note pertinent positives or negatives, otherwise 10 point system review negative except for what is noted in HPI.    Objective  VS: 132/76, 97.6, 78, 18, 98%, 150#    Physical exam:   Physical Exam  Vitals and nursing note reviewed.   Constitutional:       Appearance: Normal appearance. She is well-developed and normal weight.   HENT:      Head: Normocephalic and atraumatic.      Nose: Nose normal.      Mouth/Throat:      Mouth: Mucous membranes are moist.   Cardiovascular:      Rate and Rhythm: Normal rate and regular rhythm.      Pulses: Normal pulses.      Heart sounds: Normal heart sounds.   Pulmonary:      Effort: Pulmonary effort is normal.      Breath sounds: Normal breath sounds.   Abdominal:      General: Abdomen is flat. Bowel sounds are normal.      Palpations: Abdomen is soft.      Comments: Midline abdominal incision with staples intact, no drainage, BENNIE   Musculoskeletal:      Comments: STEWART   Skin:     General: Skin is warm and dry.   Neurological:      Mental Status: She is oriented to person, place, and time and easily aroused.   Psychiatric:         Attention and Perception: Attention normal.         Mood and Affect: Affect is flat.         Speech: Speech normal.         Behavior: Behavior normal. Behavior is cooperative.         Thought Content: Thought content normal.         Cognition and Memory: Cognition normal.         Judgment: Judgment normal.     Assessment/Plan   68 year old woman with early Alzheimer's dementia, HTN, poorly-controlled T2DM (last HbA1c = 10.8% 9/22/23; insulin-requiring), hypothyroidism, hyperlipidemia, NCNC anemia, history of COVID 12/2020, depression, and DJD with R knee replacement 12/2008, whipple procedure, liver bx.    Physical debility   For PT/OT for mobility, gait training, endurance, safety awareness, ADLs, and assessment of post-rehab  needs.    Pancreatic mass  Malignant mass s/p whipple, liver bx, c/w Creon    Post-operative pain  C/w prn oxycodone and tylenol    HTN (hypertension)  C/w amlodipine, lisinopril    UTI (urinary tract infection)  C/w Bactrim, stop date 11/25    Hypothyroidism  C/w levothyroxine    HLD (hyperlipidemia)  C/w atorvastatin    Osteoarthritis  C/w Voltaren gel    Diabetes mellitus, type 2 (CMS/HCC)  C/w metformin and SSI    Dementia (CMS/HCC)  C/w donapezil    Gastroesophageal reflux disease  C/w omperazole    Allergies  C/w Cetrizine, fluticasone    Anemia  C/w with iron    Depression  C/w paroxetine, clonazepam    Insomnia  C/w melatonin

## 2023-11-20 ENCOUNTER — NURSING HOME VISIT (OUTPATIENT)
Dept: POST ACUTE CARE | Facility: EXTERNAL LOCATION | Age: 68
End: 2023-11-20
Payer: MEDICAID

## 2023-11-20 DIAGNOSIS — E78.5 HYPERLIPIDEMIA, UNSPECIFIED HYPERLIPIDEMIA TYPE: ICD-10-CM

## 2023-11-20 DIAGNOSIS — D64.9 ANEMIA, UNSPECIFIED TYPE: ICD-10-CM

## 2023-11-20 DIAGNOSIS — G47.00 INSOMNIA, UNSPECIFIED TYPE: ICD-10-CM

## 2023-11-20 DIAGNOSIS — E11.9 TYPE 2 DIABETES MELLITUS WITHOUT COMPLICATION, UNSPECIFIED WHETHER LONG TERM INSULIN USE (MULTI): ICD-10-CM

## 2023-11-20 DIAGNOSIS — K86.89 PANCREATIC MASS (HHS-HCC): ICD-10-CM

## 2023-11-20 DIAGNOSIS — G30.9 MILD ALZHEIMER'S DEMENTIA WITHOUT BEHAVIORAL DISTURBANCE, PSYCHOTIC DISTURBANCE, MOOD DISTURBANCE, OR ANXIETY, UNSPECIFIED TIMING OF DEMENTIA ONSET (MULTI): ICD-10-CM

## 2023-11-20 DIAGNOSIS — I10 HYPERTENSION, UNSPECIFIED TYPE: ICD-10-CM

## 2023-11-20 DIAGNOSIS — E03.9 HYPOTHYROIDISM, UNSPECIFIED TYPE: ICD-10-CM

## 2023-11-20 DIAGNOSIS — G89.18 POST-OPERATIVE PAIN: ICD-10-CM

## 2023-11-20 DIAGNOSIS — R53.81 PHYSICAL DEBILITY: Primary | ICD-10-CM

## 2023-11-20 DIAGNOSIS — F32.A DEPRESSION, UNSPECIFIED DEPRESSION TYPE: ICD-10-CM

## 2023-11-20 DIAGNOSIS — N39.0 URINARY TRACT INFECTION WITHOUT HEMATURIA, SITE UNSPECIFIED: ICD-10-CM

## 2023-11-20 DIAGNOSIS — M17.9 OSTEOARTHRITIS OF KNEE, UNSPECIFIED LATERALITY, UNSPECIFIED OSTEOARTHRITIS TYPE: ICD-10-CM

## 2023-11-20 DIAGNOSIS — T78.40XS ALLERGY, SEQUELA: ICD-10-CM

## 2023-11-20 DIAGNOSIS — F02.A0 MILD ALZHEIMER'S DEMENTIA WITHOUT BEHAVIORAL DISTURBANCE, PSYCHOTIC DISTURBANCE, MOOD DISTURBANCE, OR ANXIETY, UNSPECIFIED TIMING OF DEMENTIA ONSET (MULTI): ICD-10-CM

## 2023-11-20 DIAGNOSIS — K21.9 GASTROESOPHAGEAL REFLUX DISEASE WITHOUT ESOPHAGITIS: ICD-10-CM

## 2023-11-20 PROBLEM — T78.40XA ALLERGIES: Status: ACTIVE | Noted: 2023-11-20

## 2023-11-20 PROCEDURE — 99316 NF DSCHRG MGMT 30 MIN+: CPT | Performed by: FAMILY MEDICINE

## 2023-11-20 NOTE — LETTER
Patient: Laura No  : 1955    Encounter Date: 2023    Nursing Home Visit  Name: Laura No  YOB: 1955  MRN: [id number]    Chief Complaint  Discharge  Subjective  HPI:   68 year old woman with early Alzheimer's dementia, HTN, poorly controlled T2DM (last HbA1c = 10.8% 23; insulin-requiring), hypothyroidism, HLD,  NCNC anemia, hx COVID 2020, depression, and DJD s/p RTKA 2008 who was seen at [facility].  Per hospital notes:  The patient obtains her care from the [facility] and she was recently admitted to [facility] -23 with hyperglycemia (428 - no ketosis), abd pain, N/V found to have pancreatic head mass on CT with ERCP/EUS showing biliary duct obstruction and underwent evaluation for obstructive jaundice secondary to pancreatic mass with elevated  301.  She was also seen by a pancreatic surgeon there with recommendations to proceed with Whipple resection, but the patient's daughter was dissatisfied with her mother's care there, and she wanted to obtain another opinion.   She was admitted to  for 1. Whipple Procedure 2. Excisional Wedge Liver Biopsy, x2 on .  NGT tube removed on POD1 (). Home meds were continued (levothyroxine, paroxetine, holding amlodipine, statin, lisinopril) on POD1 (). Bernard removed POD2 (), and patient passed TOV. Patient continued to improve with increased ambulation. POD5 patient had return of bowel function. Patient had developed UTI (has a history of frequent UTIs) and was being treated with bactrim. On day of discharge, patient was tolerating a regular diet, pain was adequately controlled with PO pain medications. Pt was ambulatory and voiding on own without difficulty. Patient will follow-up in a 2-3 weeks for post-check on . Patient will be discharged on lovenox for a total of 28 days and a PPI.   ----  Today patient sitting in bed she states she is in pain /10. Reports it is abdominal pain from the surgery. Appetite is  poor.  Denies HA, dizziness, SOB, CP, N&V or constipation.    ----11/20  Patient in bed, she says her pain is minimal to nothing.  She is planned for discharge in 2 days.  Appetite mainly 50%.  Denies HA, dizziness, SOB, CP, N&V or constipation    Review of systems  Reviewed chart looking at current medications, treatment, labs and x-rays and note pertinent positives or negatives, otherwise 10 point system review negative except for what is noted in HPI.    Objective  VS: 1128/65, 98.0, 88, 18, 98%, 167#    Physical exam:   Physical Exam  Vitals and nursing note reviewed.   Constitutional:       Appearance: Normal appearance. She is well-developed and normal weight.   HENT:      Head: Normocephalic and atraumatic.      Nose: Nose normal.      Mouth/Throat:      Mouth: Mucous membranes are moist.   Cardiovascular:      Rate and Rhythm: Normal rate and regular rhythm.      Pulses: Normal pulses.      Heart sounds: Normal heart sounds.   Pulmonary:      Effort: Pulmonary effort is normal.      Breath sounds: Normal breath sounds.   Abdominal:      General: Abdomen is flat. Bowel sounds are normal.      Palpations: Abdomen is soft.      Comments: Midline abdominal incision with staples intact, no drainage, BENNIE   Musculoskeletal:      Comments: STEWART   Skin:     General: Skin is warm and dry.   Neurological:      Mental Status: She is oriented to person, place, and time and easily aroused.   Psychiatric:         Attention and Perception: Attention normal.         Mood and Affect: Affect is flat.         Speech: Speech normal.         Behavior: Behavior normal. Behavior is cooperative.         Thought Content: Thought content normal.         Cognition and Memory: Cognition normal.         Judgment: Judgment normal.     Assessment/Plan   68 year old woman with early Alzheimer's dementia, HTN, poorly-controlled T2DM (last HbA1c = 10.8% 9/22/23; insulin-requiring), hypothyroidism, hyperlipidemia, NCNC anemia, history of COVID  12/2020, depression, and DJD with R knee replacement 12/2008, whipple procedure, liver bx.    Physical debility   For PT/OT for mobility, gait training, endurance, safety awareness, ADLs, and assessment of post-rehab needs.     Pancreatic mass  Malignant mass s/p whipple, liver bx, c/w Creon. To f/u with surgeon on 11/21     Post-operative pain  C/w prn oxycodone and tylenol     HTN (hypertension)  C/w amlodipine, lisinopril     UTI (urinary tract infection)  C/w Bactrim, stop date 11/25     Hypothyroidism  C/w levothyroxine     HLD (hyperlipidemia)  C/w atorvastatin     Osteoarthritis  C/w Voltaren gel     Diabetes mellitus, type 2 (CMS/HCC)  C/w metformin and SSI     Dementia (CMS/HCC)  C/w donapezil     Gastroesophageal reflux disease  C/w omperazole     Allergies  C/w Cetrizine, fluticasone     Anemia  C/w with iron     Depression  C/w paroxetine, clonazepam     Insomnia  C/w melatonin     Patient for discharge in 2 days.  Scripts given to nursing for day of discharge. Went over medications.  Patient had no questions.      Electronically Signed By: ANTONIETA Munson   11/25/23  4:36 PM

## 2023-11-20 NOTE — PROGRESS NOTES
Reason for visit:  POV #1    HPI: On November 6 this patient underwent a Whipple for a pancreatic mass.  She was discharged to a skilled nursing facility on postoperative day #8.  Operative drains have been removed.  Her only issue was a urinary tract infection which was treated based on culture.  She was discharged as per usual on extended Lovenox.  Her pathology is still pending.    -----    Ms. No comes accompanied by her daughter.  She has been at the nursing facility for about a week and will be going home today or tomorrow.  Overall she has recovered slowly but steadily over the course of the last week.  There has been 1 or 2 episodes of minor emesis.  She is otherwise keeping things down and her bowels are moving.  They note no incisional issues.  She completed her antibiotics for the urinary tract infection.    PE: On exam she looks great.  She is not jaundiced.  Her abdomen is soft and nontender with a nicely healing incision.  I removed every other staple and applied Mastisol and Steri-Strips.    Impression / Plan:    This patient is doing extraordinarily well now just over 2 weeks out from her Whipple.  She certainly is a bit frail at baseline and she really has recovered as well as I could hope to this point.  She will be seeing me next week for the removal of the remainder of her staples.    Should her pathology confirm a diagnosis of cancer we will of course get her into see medical oncology for discussion of adjuvant therapy.    She will be seeing her primary physician later this week and I think that her diabetes medications may need to be adjusted a bit given her hyperglycemia postoperatively.    She will continue on her proton pump inhibitor once a day for the rest of her life.    This note has been dictated with voice recognition software and has not been reviewed for grammar or content errors.    Update 11/22:  [facility] has returned - NO CANCER - inflammation and fibrosis.

## 2023-11-21 ENCOUNTER — OFFICE VISIT (OUTPATIENT)
Dept: SURGERY | Facility: CLINIC | Age: 68
End: 2023-11-21
Payer: MEDICAID

## 2023-11-21 VITALS
DIASTOLIC BLOOD PRESSURE: 86 MMHG | HEIGHT: 66 IN | TEMPERATURE: 96.1 F | BODY MASS INDEX: 23.63 KG/M2 | WEIGHT: 147 LBS | RESPIRATION RATE: 20 BRPM | SYSTOLIC BLOOD PRESSURE: 151 MMHG | HEART RATE: 88 BPM

## 2023-11-21 DIAGNOSIS — K86.89 PANCREATIC MASS (HHS-HCC): Primary | ICD-10-CM

## 2023-11-21 PROCEDURE — 1160F RVW MEDS BY RX/DR IN RCRD: CPT | Performed by: SURGERY

## 2023-11-21 PROCEDURE — 1111F DSCHRG MED/CURRENT MED MERGE: CPT | Performed by: SURGERY

## 2023-11-21 PROCEDURE — 3077F SYST BP >= 140 MM HG: CPT | Performed by: SURGERY

## 2023-11-21 PROCEDURE — 1159F MED LIST DOCD IN RCRD: CPT | Performed by: SURGERY

## 2023-11-21 PROCEDURE — 1036F TOBACCO NON-USER: CPT | Performed by: SURGERY

## 2023-11-21 PROCEDURE — 1125F AMNT PAIN NOTED PAIN PRSNT: CPT | Performed by: SURGERY

## 2023-11-21 PROCEDURE — 3079F DIAST BP 80-89 MM HG: CPT | Performed by: SURGERY

## 2023-11-21 PROCEDURE — 99024 POSTOP FOLLOW-UP VISIT: CPT | Performed by: SURGERY

## 2023-11-21 RX ORDER — CETIRIZINE HYDROCHLORIDE 10 MG/1
10 TABLET ORAL DAILY PRN
COMMUNITY
Start: 2022-12-05

## 2023-11-21 RX ORDER — LORATADINE 10 MG/1
10 TABLET ORAL DAILY PRN
COMMUNITY
Start: 2021-08-17

## 2023-11-21 RX ORDER — CELECOXIB 200 MG/1
200 CAPSULE ORAL DAILY PRN
COMMUNITY
Start: 2020-12-29

## 2023-11-22 LAB
LAB AP ASR DISCLAIMER: NORMAL
LABORATORY COMMENT REPORT: NORMAL
Lab: NORMAL
PATH REPORT.COMMENTS IMP SPEC: NORMAL
PATH REPORT.FINAL DX SPEC: NORMAL
PATH REPORT.GROSS SPEC: NORMAL
PATH REPORT.RELEVANT HX SPEC: NORMAL
PATH REPORT.TOTAL CANCER: NORMAL

## 2023-11-25 NOTE — PROGRESS NOTES
Nursing Home Visit  Name: Laura No  YOB: 1955  MRN: [id number]    Chief Complaint  Discharge  Subjective  HPI:   68 year old woman with early Alzheimer's dementia, HTN, poorly controlled T2DM (last HbA1c = 10.8% 9/22/23; insulin-requiring), hypothyroidism, HLD,  NCNC anemia, hx COVID 12/2020, depression, and DJD s/p RTKA 12/2008 who was seen at [facility].  Per hospital notes:  The patient obtains her care from the [facility] and she was recently admitted to [facility] 9/11-9/19/23 with hyperglycemia (428 - no ketosis), abd pain, N/V found to have pancreatic head mass on CT with ERCP/EUS showing biliary duct obstruction and underwent evaluation for obstructive jaundice secondary to pancreatic mass with elevated  301.  She was also seen by a pancreatic surgeon there with recommendations to proceed with Whipple resection, but the patient's daughter was dissatisfied with her mother's care there, and she wanted to obtain another opinion.   She was admitted to  for 1. Whipple Procedure 2. Excisional Wedge Liver Biopsy, x2 on 11/6.  NGT tube removed on POD1 (11/7). Home meds were continued (levothyroxine, paroxetine, holding amlodipine, statin, lisinopril) on POD1 (11/7). Bernard removed POD2 (11/8), and patient passed TOV. Patient continued to improve with increased ambulation. POD5 patient had return of bowel function. Patient had developed UTI (has a history of frequent UTIs) and was being treated with bactrim. On day of discharge, patient was tolerating a regular diet, pain was adequately controlled with PO pain medications. Pt was ambulatory and voiding on own without difficulty. Patient will follow-up in a 2-3 weeks for post-check on 11/21. Patient will be discharged on lovenox for a total of 28 days and a PPI.   ----11/16  Today patient sitting in bed she states she is in pain 7/10. Reports it is abdominal pain from the surgery. Appetite is poor.  Denies HA, dizziness, SOB, CP, N&V or constipation.    ----11/20   Patient in bed, she says her pain is minimal to nothing.  She is planned for discharge in 2 days.  Appetite mainly 50%.  Denies HA, dizziness, SOB, CP, N&V or constipation    Review of systems  Reviewed chart looking at current medications, treatment, labs and x-rays and note pertinent positives or negatives, otherwise 10 point system review negative except for what is noted in HPI.    Objective  VS: 1128/65, 98.0, 88, 18, 98%, 167#    Physical exam:   Physical Exam  Vitals and nursing note reviewed.   Constitutional:       Appearance: Normal appearance. She is well-developed and normal weight.   HENT:      Head: Normocephalic and atraumatic.      Nose: Nose normal.      Mouth/Throat:      Mouth: Mucous membranes are moist.   Cardiovascular:      Rate and Rhythm: Normal rate and regular rhythm.      Pulses: Normal pulses.      Heart sounds: Normal heart sounds.   Pulmonary:      Effort: Pulmonary effort is normal.      Breath sounds: Normal breath sounds.   Abdominal:      General: Abdomen is flat. Bowel sounds are normal.      Palpations: Abdomen is soft.      Comments: Midline abdominal incision with staples intact, no drainage, BENNIE   Musculoskeletal:      Comments: STEWART   Skin:     General: Skin is warm and dry.   Neurological:      Mental Status: She is oriented to person, place, and time and easily aroused.   Psychiatric:         Attention and Perception: Attention normal.         Mood and Affect: Affect is flat.         Speech: Speech normal.         Behavior: Behavior normal. Behavior is cooperative.         Thought Content: Thought content normal.         Cognition and Memory: Cognition normal.         Judgment: Judgment normal.     Assessment/Plan   68 year old woman with early Alzheimer's dementia, HTN, poorly-controlled T2DM (last HbA1c = 10.8% 9/22/23; insulin-requiring), hypothyroidism, hyperlipidemia, NCNC anemia, history of COVID 12/2020, depression, and DJD with R knee replacement 12/2008, cally  procedure, liver bx.    Physical debility   For PT/OT for mobility, gait training, endurance, safety awareness, ADLs, and assessment of post-rehab needs.     Pancreatic mass  Malignant mass s/p whipple, liver bx, c/w Creon. To f/u with surgeon on 11/21     Post-operative pain  C/w prn oxycodone and tylenol     HTN (hypertension)  C/w amlodipine, lisinopril     UTI (urinary tract infection)  C/w Bactrim, stop date 11/25     Hypothyroidism  C/w levothyroxine     HLD (hyperlipidemia)  C/w atorvastatin     Osteoarthritis  C/w Voltaren gel     Diabetes mellitus, type 2 (CMS/HCC)  C/w metformin and SSI     Dementia (CMS/HCC)  C/w donapezil     Gastroesophageal reflux disease  C/w omperazole     Allergies  C/w Cetrizine, fluticasone     Anemia  C/w with iron     Depression  C/w paroxetine, clonazepam     Insomnia  C/w melatonin     Patient for discharge in 2 days.  Scripts given to nursing for day of discharge. Went over medications.  Patient had no questions.

## 2023-11-27 DIAGNOSIS — Z90.410 H/O WHIPPLE PROCEDURE: ICD-10-CM

## 2023-11-27 DIAGNOSIS — K86.89 PANCREATIC MASS (HHS-HCC): ICD-10-CM

## 2023-11-27 DIAGNOSIS — Z90.49 H/O WHIPPLE PROCEDURE: ICD-10-CM

## 2023-11-27 RX ORDER — ENOXAPARIN SODIUM 100 MG/ML
40 INJECTION SUBCUTANEOUS DAILY
Qty: 7 EACH | Refills: 0 | Status: SHIPPED | OUTPATIENT
Start: 2023-11-27 | End: 2023-12-04

## 2023-11-27 RX ORDER — PANTOPRAZOLE SODIUM 40 MG/1
40 TABLET, DELAYED RELEASE ORAL
Qty: 30 TABLET | Refills: 11 | Status: SHIPPED | OUTPATIENT
Start: 2023-11-27 | End: 2024-11-26

## 2023-11-27 RX ORDER — OXYCODONE HYDROCHLORIDE 5 MG/1
5 TABLET ORAL EVERY 6 HOURS PRN
Qty: 20 TABLET | Refills: 0 | Status: SHIPPED | OUTPATIENT
Start: 2023-11-27 | End: 2023-12-04

## 2023-11-29 ENCOUNTER — TELEPHONE (OUTPATIENT)
Dept: SURGICAL ONCOLOGY | Facility: CLINIC | Age: 68
End: 2023-11-29
Payer: MEDICAID

## 2023-11-29 NOTE — PROGRESS NOTES
Reason for visit:   POV #2    HPI:  Whipple November 6.  Impression from POV #1:    This patient is doing extraordinarily well now just over 2 weeks out from her Whipple.  She certainly is a bit frail at baseline and she really has recovered as well as I could hope to this point.  She will be seeing me next week for the removal of the remainder of her staples.     Should her pathology confirm a diagnosis of cancer we will of course get her into see medical oncology for discussion of adjuvant therapy.     She will be seeing her primary physician later this week and I think that her diabetes medications may need to be adjusted a bit given her hyperglycemia postoperatively.     She will continue on her proton pump inhibitor once a day for the rest of her life.     This note has been dictated with voice recognition software and has not been reviewed for grammar or content errors.     Update 11/22:  Final Path has returned - NO CANCER - inflammation and fibrosis.      As noted in update above, final path is:      FINAL DIAGNOSIS   A. Liver, #1, wedge biopsy:  Bile duct hamartoma.     B. Liver, #2, wedge biopsy:  Bile duct hamartoma.     C. Lymph node, hepatic artery, dissection:  One (1) benign lymph node.     D. Pancreas, neck margin, excision:  Benign pancreas, negative for malignancy.     E. Pancreas, pancreaticoduodenectomy:  Benign pancreas with chronic inflammation and fibrosis grossly forming an ill-defined mass.  No malignant cells identified.  Segment of small intestine with no significant histopathologic abnormality.  Portion of stomach with no significant histopathologic abnormality.  Twelve (12) benign lymph nodes.  See comment.   Electronically signed by Mayela Walters MD PhD on 11/22/2023 at 1422        By the signature on this report, the individual or group listed as making the Final Interpretation/Diagnosis certifies that they have reviewed this case.    Comment    Immunohistochemical stains performed on  block E18 highlights IgG-positive and rare IgG4-positive (up to 8 per high-power field) cells in the pancreatic parenchyma.     Select slides were reviewed with Dr. Ana Altamirano who agrees with the interpretation.     Her serum IgG4 preop was normal.    Neither myself nor her referring adv GI feel that steroids are indicated.    ------    Ms. No comes with her daughter.  She is out of the facility.  There is no vomiting.  She feels stronger.  Her bowels are moving.  She has followed up with her primary physician.    PE: On exam she looks absolutely fantastic.  Her abdomen is completely soft and nontender.  Her incision has healed very nicely.  I removed the remainder of her staples and applied Mastisol and Steri-Strips.    Impression / Plan:    Ms. No is doing remarkably well after her Whipple on November 6 for what proved to be focal chronic pancreatitis.  This is absolutely the best news we could ever hope for.  She is still less than a month out and really recovering as well as I could hope.  I will have her come and see me early in the new year for a final postoperative visit.  She can stop her extended Lovenox as she did not have a malignancy.  She is taking her proton pump inhibitor.    This note has been dictated with voice recognition software and has not been reviewed for grammar or content errors.

## 2023-11-29 NOTE — TELEPHONE ENCOUNTER
I returned the daughter phone call and spoke to her. The pt is currently home from the SNF and staying with her. She states that she did not receive a prescription for the protonix that she should be on daily or the lovenox injections she needs for another week from the SNF. I reviewed and the presciptions were sent into the pharmacy and she was reinstructed on taking them. The pharmacy also received an invalid written script form the SNF and she needs something for pain as the tylenol is not helping. Dr. Dia updated on the above and a script for her pain medication was also sent to her pharmacy and the daughter called back and voiced understanding.

## 2023-12-01 ENCOUNTER — OFFICE VISIT (OUTPATIENT)
Dept: SURGERY | Facility: CLINIC | Age: 68
End: 2023-12-01
Payer: MEDICAID

## 2023-12-01 VITALS
BODY MASS INDEX: 23.62 KG/M2 | WEIGHT: 145.2 LBS | SYSTOLIC BLOOD PRESSURE: 118 MMHG | DIASTOLIC BLOOD PRESSURE: 72 MMHG | TEMPERATURE: 98.3 F | HEART RATE: 96 BPM

## 2023-12-01 DIAGNOSIS — K86.89 PANCREATIC MASS (HHS-HCC): Primary | ICD-10-CM

## 2023-12-01 PROCEDURE — 1111F DSCHRG MED/CURRENT MED MERGE: CPT | Performed by: SURGERY

## 2023-12-01 PROCEDURE — 3074F SYST BP LT 130 MM HG: CPT | Performed by: SURGERY

## 2023-12-01 PROCEDURE — 3078F DIAST BP <80 MM HG: CPT | Performed by: SURGERY

## 2023-12-01 PROCEDURE — 99024 POSTOP FOLLOW-UP VISIT: CPT | Performed by: SURGERY

## 2023-12-01 PROCEDURE — 1160F RVW MEDS BY RX/DR IN RCRD: CPT | Performed by: SURGERY

## 2023-12-01 PROCEDURE — 1159F MED LIST DOCD IN RCRD: CPT | Performed by: SURGERY

## 2023-12-01 PROCEDURE — 1126F AMNT PAIN NOTED NONE PRSNT: CPT | Performed by: SURGERY

## 2023-12-01 PROCEDURE — 1036F TOBACCO NON-USER: CPT | Performed by: SURGERY

## 2023-12-01 ASSESSMENT — PAIN SCALES - GENERAL: PAINLEVEL: 0-NO PAIN

## 2023-12-07 ENCOUNTER — TELEPHONE (OUTPATIENT)
Dept: SURGICAL ONCOLOGY | Facility: CLINIC | Age: 68
End: 2023-12-07
Payer: MEDICAID

## 2023-12-07 NOTE — TELEPHONE ENCOUNTER
I had returned the pt msg and had to leave a . I spoke to her this afternoon and she states she had a scan for her Abd that was ordered by her PCP . She is c/o of upper abd pain and tender to touch. She completed her oxycodone medication that she was taking Bid. She had wanted a refill and noted a little over a month post op. She is encouraged to take the tylenol prn . She is supposed to hear back from her PCP about the scan and either she or her daughter will call to update as they are not in the  system. She is aware her PCP may need to order pain meds or medication based off the results. She voiced understanding

## 2023-12-14 DIAGNOSIS — R10.84 GENERALIZED ABDOMINAL PAIN: ICD-10-CM

## 2023-12-14 RX ORDER — ACETAMINOPHEN 500 MG
1000 TABLET ORAL EVERY 6 HOURS PRN
Qty: 30 TABLET | Refills: 0 | Status: SHIPPED | OUTPATIENT
Start: 2023-12-14 | End: 2023-12-24

## 2024-01-12 ENCOUNTER — OFFICE VISIT (OUTPATIENT)
Dept: SURGERY | Facility: CLINIC | Age: 69
End: 2024-01-12
Payer: MEDICAID

## 2024-01-12 VITALS
SYSTOLIC BLOOD PRESSURE: 130 MMHG | HEART RATE: 96 BPM | BODY MASS INDEX: 24.49 KG/M2 | HEIGHT: 65 IN | DIASTOLIC BLOOD PRESSURE: 79 MMHG | WEIGHT: 147 LBS

## 2024-01-12 DIAGNOSIS — E11.9 TYPE 2 DIABETES MELLITUS WITHOUT COMPLICATION, UNSPECIFIED WHETHER LONG TERM INSULIN USE (MULTI): ICD-10-CM

## 2024-01-12 DIAGNOSIS — K86.89 PANCREATIC MASS (HHS-HCC): Primary | ICD-10-CM

## 2024-01-12 DIAGNOSIS — R53.81 PHYSICAL DEBILITY: ICD-10-CM

## 2024-01-12 DIAGNOSIS — D64.9 ANEMIA, UNSPECIFIED TYPE: ICD-10-CM

## 2024-01-12 PROCEDURE — 1036F TOBACCO NON-USER: CPT | Performed by: SURGERY

## 2024-01-12 PROCEDURE — 1126F AMNT PAIN NOTED NONE PRSNT: CPT | Performed by: SURGERY

## 2024-01-12 PROCEDURE — 3075F SYST BP GE 130 - 139MM HG: CPT | Performed by: SURGERY

## 2024-01-12 PROCEDURE — 1159F MED LIST DOCD IN RCRD: CPT | Performed by: SURGERY

## 2024-01-12 PROCEDURE — 99024 POSTOP FOLLOW-UP VISIT: CPT | Performed by: SURGERY

## 2024-01-12 PROCEDURE — 3078F DIAST BP <80 MM HG: CPT | Performed by: SURGERY

## 2024-01-12 RX ORDER — LACTOSE-REDUCED FOOD 0.06G-1/ML
1 LIQUID (ML) ORAL 2 TIMES DAILY
Qty: 60 ML | Refills: 3 | Status: SHIPPED | OUTPATIENT
Start: 2024-01-12 | End: 2024-03-12

## 2024-01-12 NOTE — PROGRESS NOTES
Reason for visit:  Final POV    HPI:  Ms No comes for a final postoperative visit.  Recall that on November 6 she underwent a Whipple for what fortunately proved to be chronic inflammation.  There was no cancer.  She has really recovered as well as I could hope.  She does not have any notable complaints at this time.  She has some occasional discomfort but it is improving.  She is taking her proton pump inhibitor daily.    PE: On exam she looks fantastic.  She is not jaundiced.  She has great color.  Her abdomen is completely soft and nontender.    Impression / Plan:    This patient has recovered quite nicely after her Whipple.  She is now just over 2 months out.  I have reinforced the need for her to take a proton pump inhibitor once a day for the rest of her life.  She will follow-up with me on an as-needed basis.    This note has been dictated with voice recognition software and has not been reviewed for grammar or content errors.

## 2024-03-25 ENCOUNTER — TELEPHONE (OUTPATIENT)
Dept: SURGERY | Facility: CLINIC | Age: 69
End: 2024-03-25
Payer: MEDICAID

## 2024-03-25 NOTE — TELEPHONE ENCOUNTER
I returned the pt msg and originally got her VM and then called back and spoke to her. She is c/o of a lot of gas ,bloating and some diarrhea ever since she started eating more. We discussed her creon and she was taking 12,000 unit capsules twice a day but not with meals. She was reinstructed on the medication and to take 2 capsules with each meal and one with each snack . We reviewed the purpose of the medication and she voiced understanding. She was instructed to call back if no improvement or if she needs a refill.

## 2024-06-07 ENCOUNTER — APPOINTMENT (OUTPATIENT)
Dept: SURGERY | Facility: CLINIC | Age: 69
End: 2024-06-07
Payer: MEDICAID

## 2025-09-12 ENCOUNTER — APPOINTMENT (OUTPATIENT)
Dept: SURGERY | Facility: CLINIC | Age: 70
End: 2025-09-12
Payer: MEDICAID

## (undated) DEVICE — Device

## (undated) DEVICE — CATHETER TRAY, SURESTEP, 16FR, URINE METER W/STATLOCK

## (undated) DEVICE — SUTURE, SILK, 3-0, 18 IN, MULTIPACK, BLACK

## (undated) DEVICE — SPONGE, HEMOSTATIC, CELLULOSE, SURGICEL, 2 X 14 IN

## (undated) DEVICE — SPONGE, LAP, XRAY DECT, 18IN X 18IN, W/MASTER DMT, STERILE

## (undated) DEVICE — DRESSING, ADHESIVE, ISLAND, TELFA, 2 X 3.75 IN, LF

## (undated) DEVICE — CLEANER, ELECTROSURGICAL, TIP, 5 X 5 CM, LF

## (undated) DEVICE — TRAY, MINOR, SINGLE BASIN, STERILE

## (undated) DEVICE — SUTURE, PROLENE, 5-0, 36 IN, C-1, CV-11, BLUE

## (undated) DEVICE — HOLSTER, ELECTROSURGERY ACCESSORY, STERILE

## (undated) DEVICE — PAD, GROUNDING, ELECTROSURGICAL, DUAL

## (undated) DEVICE — SUTURE, VICRYL, 4-0, 27 IN, RB-1, VIOLET

## (undated) DEVICE — SUTURE, VICRYL, 3-0, 36 IN, SH DA, VIOLET

## (undated) DEVICE — CUTTER, PROXIMATE LINEAR RELOAD, 75MM, BLUE

## (undated) DEVICE — SUTURE, PROLENE, 2-0, 18 IN, FS, BLUE

## (undated) DEVICE — SUTURE, SILK, 3-0, 30 IN, MULTIPACK, BLACK

## (undated) DEVICE — DRAIN, WOUND, ROUND, W/TROCAR, HOLE PATTERN, 10 IN, LARGE, 0.25 X 49 IN

## (undated) DEVICE — DRESSING, NON-ADHERENT, TELFA, OUCHLESS, 3 X 8 IN, STERILE

## (undated) DEVICE — DRAPE, INSTRUMENT, W/POUCH, STERI DRAPE, 7 X 11 IN, DISPOSABLE, STERILE

## (undated) DEVICE — DRAPE, PAD, INSTRUMENT, MAGNETIC, MEDIUM, 10 X 16 IN, DISPOSABLE

## (undated) DEVICE — LOOP, VESSEL, MAXI, RED

## (undated) DEVICE — SUTURE, SILK, 3-0, 18 IN SH/CR, BLACK

## (undated) DEVICE — TIP, SUCTION, YANKAUER, BULB, ADULT

## (undated) DEVICE — DRAPE, TIBURON W/ADHESIVE, 19 X 30

## (undated) DEVICE — TUBE, FEEDING, ENTERAL, PREMATURE, 5 FR, 20 IN, ENFIT

## (undated) DEVICE — DRESSING, TRANSPARENT, TEGADERM, 4 X 4-3/4 IN

## (undated) DEVICE — SUTURE, VICRYL, 5-0, 27 IN, RB-1, VIOLET

## (undated) DEVICE — SUTURE, SILK, 3-0, 30 IN, SH, CONTROL RELEASE, MULTIPACK, BLACK

## (undated) DEVICE — GOWN, SURGICAL, SMARTGOWN, XLARGE, STERILE

## (undated) DEVICE — CLIP, LIGATING, W/ADHESIVE PAD, MEDIUM, TITANIUM

## (undated) DEVICE — COUNTER, NEEDLE, FOAM BLOCK, POP-N-COUNT, W/BLADEGUARD, W/ADHESIVE 40 COUNT, RED

## (undated) DEVICE — COVER, CART, 45 X 27 X 48 IN, CLEAR

## (undated) DEVICE — SUTURE, SILK, 2-0, 30 IN, SH, BLACK

## (undated) DEVICE — CLIPPER, SURGICAL BLADE ASSEMBLY, GENERAL PURPOSE, SINGLE USE

## (undated) DEVICE — MANIFOLD, 4 PORT NEPTUNE STANDARD

## (undated) DEVICE — PITCHER, GRADUATE, 32 OZ (1200CC), STERILE

## (undated) DEVICE — SUTURE, SILK, 2-0, 18 IN, BLACK

## (undated) DEVICE — CLIP, LIGATING, W/ADHESIVE, WIDE SLOT, SMALL, TITANIUM

## (undated) DEVICE — DRAIN, PENROSE, 0.5 X 12 IN, LATEX, STERILE

## (undated) DEVICE — DRESSING, SPONGE, GAUZE, CURITY, 4 X 4 IN, STERILE

## (undated) DEVICE — SUTURE, VICRYL, 3-0, 27 IN, SH

## (undated) DEVICE — SPONGE, DISSECTOR, PEANUT, 3/8, STERILE 5 FOAM HOLDER"

## (undated) DEVICE — CUTTER,  PROX LINEAR, 75MM, THICK TISSUE, W/ SAFETY LOCK OUT

## (undated) DEVICE — BAG, DRAINAGE, BILE, W/T-TUBE ADAPTER, W/LATEX BELTS, SMALL, 9 OZ

## (undated) DEVICE — SUTURE, SILK, 2-0, TIES, 12-30 IN, BLACK

## (undated) DEVICE — CLIP, LIGATING, HORIZON, LARGE, TITANIUM

## (undated) DEVICE — SUTURE, PDSII, 1, TP-1, VIL, MONO, 48LP

## (undated) DEVICE — CUTTER,  LINEAR RELOAD, THICK TISSUE, 75MM, GREEN